# Patient Record
Sex: FEMALE | Race: WHITE | NOT HISPANIC OR LATINO | Employment: OTHER | URBAN - METROPOLITAN AREA
[De-identification: names, ages, dates, MRNs, and addresses within clinical notes are randomized per-mention and may not be internally consistent; named-entity substitution may affect disease eponyms.]

---

## 2017-01-05 ENCOUNTER — HOSPITAL ENCOUNTER (OUTPATIENT)
Dept: RADIOLOGY | Facility: HOSPITAL | Age: 57
Discharge: HOME | End: 2017-01-05
Attending: PSYCHIATRY & NEUROLOGY
Payer: COMMERCIAL

## 2017-01-05 ENCOUNTER — HOSPITAL ENCOUNTER (OUTPATIENT)
Dept: RADIOLOGY | Facility: HOSPITAL | Age: 57
Discharge: HOME | End: 2017-01-05
Attending: NURSE PRACTITIONER
Payer: COMMERCIAL

## 2017-01-05 DIAGNOSIS — G35 MULTIPLE SCLEROSIS: ICD-10-CM

## 2017-01-05 DIAGNOSIS — Z13.820 OSTEOPOROSIS SCREENING: ICD-10-CM

## 2017-01-05 PROCEDURE — 70553 MRI BRAIN STEM W/O & W/DYE: CPT | Mod: TC,PO

## 2017-01-05 PROCEDURE — A9585 GADOBUTROL INJECTION: HCPCS | Mod: PO | Performed by: PSYCHIATRY & NEUROLOGY

## 2017-01-05 PROCEDURE — 70543 MRI ORBT/FAC/NCK W/O &W/DYE: CPT | Mod: 26,,, | Performed by: RADIOLOGY

## 2017-01-05 PROCEDURE — 72156 MRI NECK SPINE W/O & W/DYE: CPT | Mod: 26,,, | Performed by: RADIOLOGY

## 2017-01-05 PROCEDURE — 77080 DXA BONE DENSITY AXIAL: CPT | Mod: 26,,, | Performed by: RADIOLOGY

## 2017-01-05 PROCEDURE — 72157 MRI CHEST SPINE W/O & W/DYE: CPT | Mod: TC,PO

## 2017-01-05 PROCEDURE — 77080 DXA BONE DENSITY AXIAL: CPT | Mod: TC,PO

## 2017-01-05 PROCEDURE — 72157 MRI CHEST SPINE W/O & W/DYE: CPT | Mod: 26,,, | Performed by: RADIOLOGY

## 2017-01-05 PROCEDURE — 70553 MRI BRAIN STEM W/O & W/DYE: CPT | Mod: 26,,, | Performed by: RADIOLOGY

## 2017-01-05 PROCEDURE — 25500020 PHARM REV CODE 255: Mod: PO | Performed by: PSYCHIATRY & NEUROLOGY

## 2017-01-05 PROCEDURE — 72156 MRI NECK SPINE W/O & W/DYE: CPT | Mod: TC,PO

## 2017-01-05 PROCEDURE — 70543 MRI ORBT/FAC/NCK W/O &W/DYE: CPT | Mod: TC,PO

## 2017-01-05 RX ORDER — GADOBUTROL 604.72 MG/ML
7 INJECTION INTRAVENOUS
Status: COMPLETED | OUTPATIENT
Start: 2017-01-05 | End: 2017-01-05

## 2017-01-05 RX ADMIN — GADOBUTROL 7 ML: 604.72 INJECTION INTRAVENOUS at 11:01

## 2017-01-06 ENCOUNTER — HOSPITAL ENCOUNTER (OUTPATIENT)
Dept: RADIOLOGY | Facility: HOSPITAL | Age: 57
Discharge: HOME | End: 2017-01-06
Attending: NURSE PRACTITIONER
Payer: COMMERCIAL

## 2017-01-06 DIAGNOSIS — R10.2 PELVIC PAIN IN FEMALE: ICD-10-CM

## 2017-01-06 PROCEDURE — 76830 TRANSVAGINAL US NON-OB: CPT | Mod: 26,,, | Performed by: RADIOLOGY

## 2017-01-06 PROCEDURE — 76856 US EXAM PELVIC COMPLETE: CPT | Mod: TC,PO

## 2017-01-06 PROCEDURE — 76856 US EXAM PELVIC COMPLETE: CPT | Mod: 26,,, | Performed by: RADIOLOGY

## 2017-01-11 ENCOUNTER — PATIENT MESSAGE (OUTPATIENT)
Dept: OPTOMETRY | Facility: CLINIC | Age: 57
End: 2017-01-11

## 2017-01-12 ENCOUNTER — PATIENT MESSAGE (OUTPATIENT)
Dept: OPTOMETRY | Facility: CLINIC | Age: 57
End: 2017-01-12

## 2017-01-12 RX ORDER — BUPROPION HYDROCHLORIDE 150 MG/1
150 TABLET ORAL DAILY
Qty: 90 TABLET | Refills: 1 | Status: SHIPPED | OUTPATIENT
Start: 2017-01-12 | End: 2017-08-10 | Stop reason: DRUGHIGH

## 2017-01-12 NOTE — TELEPHONE ENCOUNTER
Original authorizing provider: Michael V Knight, MD Karen D Eleser would like a refill of the following medications:   buPROPion (WELLBUTRIN XL) 150 MG TB24 tablet [Madhav Tyler MD]     Preferred pharmacy: [facility] #[id number] - [place], [place] - [address]     Comment:   Will you please change this prescription to a 90 day supply?     Thanks!   Karen Eleser

## 2017-01-25 ENCOUNTER — OFFICE VISIT (OUTPATIENT)
Dept: PSYCHIATRY | Facility: CLINIC | Age: 57
End: 2017-01-25
Payer: COMMERCIAL

## 2017-01-25 ENCOUNTER — OFFICE VISIT (OUTPATIENT)
Dept: OPTOMETRY | Facility: CLINIC | Age: 57
End: 2017-01-25
Payer: COMMERCIAL

## 2017-01-25 ENCOUNTER — CLINICAL SUPPORT (OUTPATIENT)
Dept: OPHTHALMOLOGY | Facility: CLINIC | Age: 57
End: 2017-01-25
Payer: COMMERCIAL

## 2017-01-25 VITALS
DIASTOLIC BLOOD PRESSURE: 87 MMHG | HEART RATE: 92 BPM | WEIGHT: 162 LBS | HEIGHT: 60 IN | SYSTOLIC BLOOD PRESSURE: 167 MMHG | BODY MASS INDEX: 31.8 KG/M2

## 2017-01-25 DIAGNOSIS — F33.1 MAJOR DEPRESSIVE DISORDER, RECURRENT, MODERATE: Primary | ICD-10-CM

## 2017-01-25 DIAGNOSIS — H40.053 OCULAR HYPERTENSION, BILATERAL: ICD-10-CM

## 2017-01-25 DIAGNOSIS — F41.1 GENERALIZED ANXIETY DISORDER: ICD-10-CM

## 2017-01-25 DIAGNOSIS — R53.82 CHRONIC FATIGUE: ICD-10-CM

## 2017-01-25 DIAGNOSIS — G35 MULTIPLE SCLEROSIS: ICD-10-CM

## 2017-01-25 DIAGNOSIS — F51.04 CHRONIC INSOMNIA: ICD-10-CM

## 2017-01-25 DIAGNOSIS — H40.053 OCULAR HYPERTENSION, BILATERAL: Primary | ICD-10-CM

## 2017-01-25 DIAGNOSIS — F33.1 MAJOR DEPRESSIVE DISORDER, RECURRENT EPISODE, MODERATE: ICD-10-CM

## 2017-01-25 DIAGNOSIS — H21.562 AFFERENT PUPILLARY DEFECT OF LEFT EYE: ICD-10-CM

## 2017-01-25 DIAGNOSIS — F34.1 DYSTHYMIC DISORDER: ICD-10-CM

## 2017-01-25 DIAGNOSIS — F41.1 GAD (GENERALIZED ANXIETY DISORDER): ICD-10-CM

## 2017-01-25 PROCEDURE — 90833 PSYTX W PT W E/M 30 MIN: CPT | Mod: S$GLB,,, | Performed by: PSYCHIATRY & NEUROLOGY

## 2017-01-25 PROCEDURE — 92012 INTRM OPH EXAM EST PATIENT: CPT | Mod: S$GLB,,, | Performed by: OPTOMETRIST

## 2017-01-25 PROCEDURE — 99999 PR PBB SHADOW E&M-EST. PATIENT-LVL IV: CPT | Mod: PBBFAC,,, | Performed by: OPTOMETRIST

## 2017-01-25 PROCEDURE — 99213 OFFICE O/P EST LOW 20 MIN: CPT | Mod: S$GLB,,, | Performed by: PSYCHIATRY & NEUROLOGY

## 2017-01-25 PROCEDURE — 92133 CPTRZD OPH DX IMG PST SGM ON: CPT | Mod: S$GLB,,, | Performed by: OPTOMETRIST

## 2017-01-25 PROCEDURE — 99999 PR PBB SHADOW E&M-EST. PATIENT-LVL III: CPT | Mod: PBBFAC,,, | Performed by: PSYCHIATRY & NEUROLOGY

## 2017-01-25 PROCEDURE — 92083 EXTENDED VISUAL FIELD XM: CPT | Mod: S$GLB,,, | Performed by: OPTOMETRIST

## 2017-01-25 PROCEDURE — 1159F MED LIST DOCD IN RCRD: CPT | Mod: S$GLB,,, | Performed by: PSYCHIATRY & NEUROLOGY

## 2017-01-25 RX ORDER — BUPROPION HYDROCHLORIDE 300 MG/1
300 TABLET ORAL DAILY
Qty: 90 TABLET | Refills: 1 | Status: SHIPPED | OUTPATIENT
Start: 2017-01-25 | End: 2017-08-10 | Stop reason: SDUPTHER

## 2017-01-25 RX ORDER — DEXTROAMPHETAMINE SACCHARATE, AMPHETAMINE ASPARTATE, DEXTROAMPHETAMINE SULFATE AND AMPHETAMINE SULFATE 5; 5; 5; 5 MG/1; MG/1; MG/1; MG/1
1 TABLET ORAL 3 TIMES DAILY
Qty: 90 TABLET | Refills: 0 | Status: SHIPPED | OUTPATIENT
Start: 2017-03-25 | End: 2017-05-02 | Stop reason: SDUPTHER

## 2017-01-25 RX ORDER — DULOXETIN HYDROCHLORIDE 60 MG/1
120 CAPSULE, DELAYED RELEASE ORAL DAILY
Qty: 180 CAPSULE | Refills: 1 | Status: SHIPPED | OUTPATIENT
Start: 2017-01-25 | End: 2017-08-10 | Stop reason: SDUPTHER

## 2017-01-25 RX ORDER — DEXTROAMPHETAMINE SACCHARATE, AMPHETAMINE ASPARTATE, DEXTROAMPHETAMINE SULFATE AND AMPHETAMINE SULFATE 5; 5; 5; 5 MG/1; MG/1; MG/1; MG/1
1 TABLET ORAL 3 TIMES DAILY
Qty: 90 TABLET | Refills: 0 | Status: SHIPPED | OUTPATIENT
Start: 2017-02-24 | End: 2017-05-02

## 2017-01-25 RX ORDER — DEXTROAMPHETAMINE SACCHARATE, AMPHETAMINE ASPARTATE, DEXTROAMPHETAMINE SULFATE AND AMPHETAMINE SULFATE 5; 5; 5; 5 MG/1; MG/1; MG/1; MG/1
1 TABLET ORAL 3 TIMES DAILY
Qty: 90 TABLET | Refills: 0 | Status: SHIPPED | OUTPATIENT
Start: 2017-01-25 | End: 2017-05-02

## 2017-01-25 RX ORDER — ALPRAZOLAM 0.5 MG/1
0.5 TABLET ORAL 2 TIMES DAILY PRN
Qty: 180 TABLET | Refills: 1 | Status: SHIPPED | OUTPATIENT
Start: 2017-01-25 | End: 2017-05-02 | Stop reason: SDUPTHER

## 2017-01-25 NOTE — PROGRESS NOTES
"Check baseline EKG on RTC.    Outpatient Psychiatry Follow-Up Visit (MD/NP)  1/25/2017    Session Length:  30 minutes (E&M plus psychotherapy)     Clinical Status of Patient: Outpatient (Ambulatory)    Chief Complaint: Karen D Eleser is a 56 y.o. female who presents today for follow-up of chronic depression and anxiety.    Met with patient.     Interval History and Content of Current Session:  Interim Events/Subjective Report/Content of Current Session:  First f/u appt with me since 10/20/2016.  Pt had seen Dr. Tash Clark in the past; last appointment was on 9/28/2015 (this note was reviewed).    Plan from last session: "Continue Cymbalta 120 mg daily  Add Wellbutrin  mg one tablet to the 300 mg tablet pt takes daily to = 450 mg/day. Pt denies Hx of seizures.    Xanax 0.5 mg BID prn anxiety  Ambien 5 mg po qhs prn insomnia  Also, continue Adderall immediate release 20 mg for treatment of fatigue/focus/concentration and treatment resistant depression. I have recommended she take no > 60 mg max per day"    "I'm OK.  Hanging in there".    She spends a lot of time by herself.      She has lost contact with a lot of her friends from work.    She does keep in touch with her best friend, who now lives north of [place].  They keep in touch by phone.    She has interests in movies and history.    She has a harder time reading since she was Dx with MS.    She states she has joined on line groups.    She is also researching her family roots/ancestry.    I encouraged pt to try to find friends to pursue activities with.    She talks about ancestry -- she has some ancestors from [place], [place].      She thinks her depression has been doing somewhat better.  She enjoys doing things outdoors in cool weather.  She likes to travel -- I recommended she try to join a group that enjoys travelling.      Current SIGECAPS:    Sleep -- has problems falling asleep; must take an Ambien and Xanax to be able to fall asleep.   Interests " "-- low ("I don't have anybody to do anything with" -- very few friends; had done a lot of things with her sister)    Guilt -- for "enabling my sister for so many years", not showing tough love.  Similar feelings with her mom (had to say "no" to her; feelings regarding circumstances surrounding her death).    Energy -- very low; always fatigued   Concentration -- poor due to MS; has a hard time reading   Appetite -- "not very good"; "I never thought I would say I am too tired to eat".    Psychomotor --  Somewhat decreased   Suicidal ideation -- denies   She does not feel optimistic about the future.       She continues to take Adderall; however, it tends to "hype" her up a lot, especially if she takes the max 3 tablets during the day. "I feel wired outside, but inside I still feel tired".    She also has had some illusions at night after taking Ambien (TV cord looks like a snake), as well as sleepwalking and sleepeating behaviors.     She has been rejected twice for SS disability.  She had an  the second time and they went before the , but the disability was still declined.    She states she still has sporadic flare ups of MS that tend to keep her basically confined to bed.   She still tries to use the CPAP nightly, but it is cumbersome and uncomfortable, so she has problems sleeping with it in place.   She also has anxiety attacks (not full panic); tends to have them in crowds, but sometimes she cannot state why.    She had seen Dr. Royce Pina for MS until his death in 2006.  She has seen other neurologists.  Dr. Maia Minaya is her neurologist currently.    Fatigue is her biggest physical problem.  She has chronic painful neuropathy in her feet, legs.      Past psych meds: celexa, lexapro, and effexor in the past.    From previous sessions:  Regarding her mom and sister:  Mother passed: 9/20/2015. Sister passed: 6/18/2014. Sister had problems with drugs and alcohol, was a nurse and lost her " "nursing license.  She  from a massive MI while in the senior living house after substance rehab.  She was close to them both.  She had enjoyed going to the movies, going out to eat, travel, etc.  However, she had always done these things with her sister, who  in 2014.  She has no one in her life that she is close to compared to her sister in the past.  She has a brother who is 6 years younger -- "he can be very abrasive, he talks real loud".  "He also has his life, his children".  She also notes he does not understand or appreciate her mental illness.   --Regarding past intimate relationships: She has had just one very serious relationship that lasted for 10 years.  She states the relationship ended because he lied to her & told her that he worked and stayed at his mother's house, caring for her.  However, he did not work, lived off his mother's SS, then was kicked out of the mom's trailer by his siblings after she , so he was homeless for a while.  She thinks he may be living with his ex-wife now.  She states this happened about 5 years ago.    Since then, she had met 2 guys on line ([email or web address]) -- went out with each of them a few times, but nothing ever came of this.    She notes her mother was very controlling.  Pt also was shy growing up.  Pt denies ever being sexually molested.  She denies ever having problems with alcohol or drugs.  She is out on disability from work ([employer]) as a .    She is still getting a check through her work disability co.     PSYCHOTHERAPY ADD-ON [id number]   30 (16-37*) minutes   · Target symptoms: depression, anxiety , fatigue  · Why chosen therapy is appropriate versus another modality: relevant to diagnosis, patient responds to this modality  · Outcome monitoring methods: self-report  · Therapeutic intervention type: supportive psychotherapy  · Topics discussed/themes: illness/death of loved ones, stress related to medical comorbidities, life stage " transitional issues, social isolation.  · The patient's response to the intervention is accepting.   · The patient's progress toward treatment goals is limited.  · Duration of intervention: 20 minutes      Review of Systems   · PSYCHIATRIC: Pertinant items are noted in the narrative.  · CONSTITUTIONAL: Some recent wt loss.  Chronic fatigue.     · MUSCULOSKELETAL: + chronic pain in her feet/legs  · NEUROLOGIC: No weakness, sensory changes, seizures, confusion, memory loss, tremor or other abnormal movements.  · ENDOCRINE: No polydipsia or polyuria.  · INTEGUMENTARY: No rashes or lacerations.  · EYES: No exophthalmos, jaundice or blindness.  · ENT: No dizziness, tinnitus or hearing loss.  · RESPIRATORY: No shortness of breath.  · CARDIOVASCULAR: No tachycardia or chest pain.  · GASTROINTESTINAL: No nausea, vomiting, pain, constipation or diarrhea.  · GENITOURINARY: No frequency, dysuria.        Past Medical, Family and Social History: The patient's past medical, family and social history have been reviewed and updated as appropriate within the electronic medical record -- see encounter notes.    Current Medications:    Current Outpatient Prescriptions on File Prior to Visit   Medication Sig Dispense Refill    alprazolam (XANAX) 0.5 MG tablet Take 1 tablet (0.5 mg total) by mouth 2 (two) times daily as needed for Anxiety. 180 tablet 1    b complex vitamins (VITAMIN B COMPLEX) tablet Take by mouth. 1 Tablet Oral Every day      buPROPion (WELLBUTRIN XL) 150 MG TB24 tablet Take 1 tablet (150 mg total) by mouth once daily. 90 tablet 1    buPROPion (WELLBUTRIN XL) 300 MG 24 hr tablet Take 1 tablet (300 mg total) by mouth once daily. Take with food. 90 tablet 1    cholecalciferol, vitamin D3, (VITAMIN D3) 5,000 unit Tab Take 5,000 Units by mouth once daily.       coenzyme Q10 (CO Q-10) 400 mg Cap Take by mouth.      COPAXONE 40 mg/mL Syrg injection INJECT 40MG SUBCUTANEOUSLY THREE TIMES A WEEK 12 Syringe 5    coQ10,  ubiquinol, 200 mg Cap Take 1 capsule by mouth once daily.      dextroamphetamine-amphetamine (ADDERALL) 20 mg tablet Take 1 tablet by mouth 3 (three) times daily. 90 tablet 0    dextroamphetamine-amphetamine (ADDERALL) 20 mg tablet Take 1 tablet by mouth 3 (three) times daily. 90 tablet 0    dextroamphetamine-amphetamine (ADDERALL) 20 mg tablet Take 1 tablet by mouth 3 (three) times daily. 90 tablet 0    diclofenac sodium (VOLTAREN) 1 % Gel Apply 2 g topically 4 (four) times daily. (Patient taking differently: Apply 2 g topically continuous prn. ) 5 Tube 3    duloxetine (CYMBALTA) 60 MG capsule Take 2 capsules (120 mg total) by mouth once daily. 2 Capsule, Delayed Release(E.C.) Oral Every day 180 capsule 1    gabapentin (NEURONTIN) 600 MG tablet TAKE 2 TABLETS EVERY MORNING, 1 TABLET AT NOON AND 2 TABLETS AT BEDTIME 450 tablet 1    latanoprost 0.005 % ophthalmic solution Place 1 drop into both eyes every evening. 2.5 mL 5    lovastatin (MEVACOR) 40 MG tablet TAKE 1 TABLET (40 MG TOTAL) BY MOUTH EVERY EVENING. 90 tablet 3    meloxicam (MOBIC) 15 MG tablet Take 1 tablet by mouth once daily.  3    multivitamin (THERAGRAN) per tablet Take by mouth. 1 Tablet Oral Every day      PREVIDENT 5000 SENSITIVE 1.1-5 % Pste Apply topically once daily.       rosuvastatin (CRESTOR) 10 MG tablet Take 1 tablet (10 mg total) by mouth every evening. 30 tablet 3    solifenacin (VESICARE) 5 MG tablet Take 2 tablets (10 mg total) by mouth once daily. 180 tablet 3    tizanidine (ZANAFLEX) 2 MG tablet TAKE 1/2 TABLET BY MOUTH TWICE A DAY THEN 2 TABS AT BEDTIME AS NEEDED 270 tablet 1    tramadol (ULTRAM) 50 mg tablet TAKE 1 TO 2 TABLETS BY MOUTH EVERY 6 HOURS AS NEEDED FOR PAIN 540 tablet 1    zolpidem (AMBIEN) 5 MG Tab TAKE 1 TABLET BY MOUTH EVERY EVENING 90 tablet 0     No current facility-administered medications on file prior to visit.        Compliance: yes    Side effects: None    Risk Parameters:  Patient reports no  "suicidal ideation  Patient reports no homicidal ideation  Patient reports no self-injurious behavior  Patient reports no violent behavior    Exam (detailed: at least 9 elements; comprehensive: all 15 elements)    Constitutional  Vitals:  Most recent vital signs, dated less than 90 days prior to this appointment, were reviewed.     Vitals - 1 value per visit 12/13/2016 1/25/2017 1/25/2017   SYSTOLIC 125  167   DIASTOLIC 77  87   PULSE 98  92   Weight (lb) 161  162   HEIGHT 5' 0"  5' 0"   BODY MASS INDEX 31.44  31.64   VISIT REPORT      Pain Score  5 0        Vitals - 1 value per visit 9/28/2016 10/20/2016 12/7/2016   SYSTOLIC 124 172 145   DIASTOLIC 86 90 78   PULSE 60 99 99   Weight (lb) 163.36 164 160.7   HEIGHT 5' 0" 5' 0" 5' 0"   BODY MASS INDEX 31.9 32.03 31.38   VISIT REPORT      Pain Score  5  5        General:  unremarkable, age appropriate, well dressed, neatly groomed. Not wearing make-up today (Cheondoism/charismatic elin)     Musculoskeletal  Muscle Strength/Tone:  no tremor, no tic    Gait & Station:  non-ataxic      Psychiatric  Speech:  normal tone, normal rate, normal pitch, normal volume, spontaneous    Mood & Affect:  "Not too good"  congruent and appropriate to situation/content. Appropriately tearful when discussing aspects of grief.    Thought Process:  normal and logical, goal-directed    Associations:  intact    Thought Content:  normal, no suicidality, no homicidality, delusions, or paranoia    Insight & Judgement:  good, intact  adequate to circumstances, age appropriate, good    Orientation:  grossly intact, person, place, time/date, situation    Memory:  intact for content of interview, grossly intact    Language:  grossly intact    Attention Span & Concentration:  able to focus    Fund of Knowledge:  intact and appropriate to age and level of education, familiar with aspects of current personal life    Assessment and Diagnosis    Status/Progress: Based on the examination today, the " "patient's problem(s) is/are adequately but not ideally controlled. New problems have not been presented today. Comorbidities are complicating management of the primary condition. There are no active rule-out diagnoses for this patient at this time.    General Impression:   Major Depressive Disorder, Recurrent: Moderate    Dysthymic Disorder   Generalized Anxiety Disorder   Chronic Insomnia   Also, multiple sclerosis    Intervention/Counseling/Treatment Plan    Continue all current meds:  Continue Cymbalta 120 mg daily  Continue Wellbutrin  mg total daily.  Pt has denied Hx of seizures.     Xanax 0.5 mg BID prn anxiety  Ambien 5 mg po qhs prn insomnia  Also, continue Adderall immediate release 20 mg for treatment of fatigue/focus/concentration and treatment resistant depression.  I have recommended she take no > 60 mg max per day.  Three Rx for Adderall 20 mg, each for a 30 day supply with no refills & with "Do not fill until" dates posted accordingly, were given to patient.   Encourage individual psychotherapy for support. Patient had been meeting with LCSW in Neurology clinic; she can continue this (if insurance allows) or consider f/u with Dr. Nery Toledo or other therapist in our clinic.        Additional Notes:  See above regarding JESÚS for previous psych progress notes.    No prior suspicious activity on [facility]  web site.  Pt had Adderall filled on 3/24/16, 12/29/15, and 10/8/15.  She had Xanax filled on 3/24/16, 10/29/16, 7/26/15, & 4/28/15.    Also, consider obtaining baseline EKG since pt is taking Adderall.  Also, consider possibility of ECT to help with refractory depression that meds are only partially treating.  She also had taken Celexa, Lexapro and Effexor in the past.      Return to Clinic: 3 months, or sooner prn.       "

## 2017-01-25 NOTE — PATIENT INSTRUCTIONS
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  Prior diagnosis of bilateral ocular hypertension.  Placed on Latanoprost 0.005% ophthalmic solution in each eye every night at the time of the last visit.  Good response of IOP to Latanoprost eyedrops in each eye, and IOP now within normal range in each eye.  No evidence of overtly glaucomaotus visual field changes in either eye per HVF test done today.  RNFL thickness within normal range 360° in the right eye, and focally borderline (NS) in the left eye.   Discussed ocular hypertension as risk factor for the possible development of glaucoma.  Continue Lataoprost in both eyes every night.  Recheck in six months with repeat HVF test (24-2 KARO Standard) at that time.  Orders for future HVF test entered.

## 2017-01-25 NOTE — MR AVS SNAPSHOT
[facility] - Optometry  [address]  [facility] [address]  Phone: [phone]  Fax: [phone]                  Karen D Eleser   2017 3:30 PM   Office Visit    Description:  Female : 1960   Provider:  Tony Murray OD   Department:  [facility] - Optometry           Reason for Visit     Concerns About Ocular Health           Diagnoses this Visit        Comments    Ocular hypertension, bilateral    -  Primary     Afferent pupillary defect of left eye                To Do List           Goals (5 Years of Data)     None      [facility]     [facility] On Call Nurse Care Line -  Assistance  Registered nurses in the [facility] Center provide clinical advisement, health education, appointment booking, and other advisory services.  Call for this free service at [phone].             Medications           Message regarding Medications     Verify the changes and/or additions to your medication regime listed below are the same as discussed with your clinician today.  If any of these changes or additions are incorrect, please notify your healthcare provider.             Verify that the below list of medications is an accurate representation of the medications you are currently taking.  If none reported, the list may be blank. If incorrect, please contact your healthcare provider. Carry this list with you in case of emergency.           Current Medications     alprazolam (XANAX) 0.5 MG tablet Take 1 tablet (0.5 mg total) by mouth 2 (two) times daily as needed for Anxiety.    b complex vitamins (VITAMIN B COMPLEX) tablet Take by mouth. 1 Tablet Oral Every day    buPROPion (WELLBUTRIN XL) 150 MG TB24 tablet Take 1 tablet (150 mg total) by mouth once daily.    buPROPion (WELLBUTRIN XL) 300 MG 24 hr tablet Take 1 tablet (300 mg total) by mouth once daily. Take with food.    cholecalciferol, vitamin D3, (VITAMIN D3) 5,000 unit Tab Take 5,000 Units by mouth once daily.     coenzyme Q10  (CO Q-10) 400 mg Cap Take by mouth.    COPAXONE 40 mg/mL Syrg injection INJECT 40MG SUBCUTANEOUSLY THREE TIMES A WEEK    coQ10, ubiquinol, 200 mg Cap Take 1 capsule by mouth once daily.    dextroamphetamine-amphetamine (ADDERALL) 20 mg tablet Take 1 tablet by mouth 3 (three) times daily.    dextroamphetamine-amphetamine (ADDERALL) 20 mg tablet Starting on Feb 24, 2017. Take 1 tablet by mouth 3 (three) times daily.    dextroamphetamine-amphetamine (ADDERALL) 20 mg tablet Starting on Mar 25, 2017. Take 1 tablet by mouth 3 (three) times daily.    diclofenac sodium (VOLTAREN) 1 % Gel Apply 2 g topically 4 (four) times daily.    duloxetine (CYMBALTA) 60 MG capsule Take 2 capsules (120 mg total) by mouth once daily. 2 Capsule, Delayed Release(E.C.) Oral Every day    gabapentin (NEURONTIN) 600 MG tablet TAKE 2 TABLETS EVERY MORNING, 1 TABLET AT NOON AND 2 TABLETS AT BEDTIME    latanoprost 0.005 % ophthalmic solution Place 1 drop into both eyes every evening.    lovastatin (MEVACOR) 40 MG tablet TAKE 1 TABLET (40 MG TOTAL) BY MOUTH EVERY EVENING.    meloxicam (MOBIC) 15 MG tablet Take 1 tablet by mouth once daily.    multivitamin (THERAGRAN) per tablet Take by mouth. 1 Tablet Oral Every day    PREVIDENT 5000 SENSITIVE 1.1-5 % Pste Apply topically once daily.     rosuvastatin (CRESTOR) 10 MG tablet Take 1 tablet (10 mg total) by mouth every evening.    solifenacin (VESICARE) 5 MG tablet Take 2 tablets (10 mg total) by mouth once daily.    tizanidine (ZANAFLEX) 2 MG tablet TAKE 1/2 TABLET BY MOUTH TWICE A DAY THEN 2 TABS AT BEDTIME AS NEEDED    tramadol (ULTRAM) 50 mg tablet TAKE 1 TO 2 TABLETS BY MOUTH EVERY 6 HOURS AS NEEDED FOR PAIN    zolpidem (AMBIEN) 5 MG Tab TAKE 1 TABLET BY MOUTH EVERY EVENING           Clinical Reference Information           Vital Signs - Last Recorded     LMP                   (LMP Unknown)           Allergies as of 1/25/2017     Levaquin  [Levofloxacin]      Immunizations Administered on Date of  Encounter - 1/25/2017     None      Orders Placed During Today's Visit     Future Labs/Procedures Expected by Expires    Dodd Visual Field - OU - Extended - Both Eyes  7/25/2017 1/25/2018      Instructions    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  Prior diagnosis of bilateral ocular hypertension.  Placed on Latanoprost 0.005% ophthalmic solution in each eye every night at the time of the last visit.  Good response of IOP to Latanoprost eyedrops in each eye, and IOP now within normal range in each eye.  No evidence of overtly glaucomaotus visual field changes in either eye per HVF test done today.  RNFL thickness within normal range 360° in the right eye, and focally borderline (NS) in the left eye.   Discussed ocular hypertension as risk factor for the possible development of glaucoma.  Continue Lataoprost in both eyes every night.  Recheck in six months with repeat HVF test (24-2 KARO Standard) at that time.  Orders for future HVF test entered.

## 2017-01-26 NOTE — PROGRESS NOTES
HPI     Concerns About Ocular Health    Additional comments: 1 month f/u            Comments   Patient in for progress check.  Patient is in for a 1 month f/u with [facility]/ Oct     Being followed for (diagnosis):   Ocular Hypertension    Date last seen:  12/07/2016    Doctor last seen:  Dr. Murray     Prescribed eye medications(s) using:  Latanoprost eye drops 1 x a day OU     OTC eye medication(s) using:  None     Signs/symptoms of condition resolved/better/stable/worse?:      Allergies/Medications reviewed today?  Yes              Last edited by Tim Velazquez on 1/25/2017  3:27 PM. (History)            Assessment /Plan     For exam results, see Encounter Report.    1. Ocular hypertension, bilateral  Dodd Visual Field - OU - Extended - Both Eyes   2. 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  Prior diagnosis of bilateral ocular hypertension.  Placed on Latanoprost 0.005% ophthalmic solution in each eye every night at the time of the last visit.  Good response of IOP to Latanoprost eyedrops in each eye, and IOP now within normal range in each eye.  No evidence of overtly glaucomaotus visual field changes in either eye per HVF test done today.  RNFL thickness within normal range 360° in the right eye, and focally borderline (NS) in the left eye.   Discussed ocular hypertension as risk factor for the possible development of glaucoma.  Continue Lataoprost in both eyes every night.  Recheck in six months with repeat HVF test (24-2 KARO Standard) at that time.  Orders for future HVF test entered.

## 2017-02-23 ENCOUNTER — PATIENT MESSAGE (OUTPATIENT)
Dept: NEUROLOGY | Facility: CLINIC | Age: 57
End: 2017-02-23

## 2017-02-24 ENCOUNTER — TELEPHONE (OUTPATIENT)
Dept: NEUROLOGY | Facility: CLINIC | Age: 57
End: 2017-02-24

## 2017-02-24 NOTE — TELEPHONE ENCOUNTER
Pt provided writer with name of her  and this information was inserted into signed release that pt prepared during her last clinic visit.  Phoned  Anat Neff at [phone] and left voicemail.

## 2017-03-08 NOTE — TELEPHONE ENCOUNTER
Left a second voicemail for Anat Neff,  with [facility] who's representing pt with her disability claim.  Awaiting return call.  Updated pt through [employer].

## 2017-03-13 ENCOUNTER — TELEPHONE (OUTPATIENT)
Dept: NEUROLOGY | Facility: CLINIC | Age: 57
End: 2017-03-13

## 2017-03-13 NOTE — TELEPHONE ENCOUNTER
Return call to pt's  Anat Neff [phone] inquiring if our center can provide any additional assistance with pt's disability claim.  Faxed authorization for release of information to [phone].

## 2017-03-28 ENCOUNTER — TELEPHONE (OUTPATIENT)
Dept: NEUROLOGY | Facility: CLINIC | Age: 57
End: 2017-03-28

## 2017-03-28 NOTE — TELEPHONE ENCOUNTER
Faxed forms and letter to support reconsideration of pt's denied SSDI benefit to pt's , Anat Tawanda, [phone].

## 2017-03-30 DIAGNOSIS — G47.00 INSOMNIA: ICD-10-CM

## 2017-03-30 RX ORDER — ZOLPIDEM TARTRATE 5 MG/1
TABLET ORAL
Qty: 90 TABLET | Status: CANCELLED | OUTPATIENT
Start: 2017-03-30

## 2017-04-03 RX ORDER — MELOXICAM 15 MG/1
15 TABLET ORAL DAILY
Qty: 90 TABLET | Refills: 2 | Status: SHIPPED | OUTPATIENT
Start: 2017-04-03 | End: 2018-01-19 | Stop reason: SDUPTHER

## 2017-04-05 RX ORDER — ZOLPIDEM TARTRATE 5 MG/1
5 TABLET ORAL NIGHTLY
Qty: 90 TABLET | Refills: 0 | Status: SHIPPED | OUTPATIENT
Start: 2017-04-05 | End: 2017-05-02 | Stop reason: SDUPTHER

## 2017-04-24 RX ORDER — GABAPENTIN 600 MG/1
TABLET ORAL
Qty: 450 TABLET | Refills: 1 | Status: SHIPPED | OUTPATIENT
Start: 2017-04-24 | End: 2017-12-21 | Stop reason: SDUPTHER

## 2017-05-02 ENCOUNTER — OFFICE VISIT (OUTPATIENT)
Dept: PSYCHIATRY | Facility: CLINIC | Age: 57
End: 2017-05-02
Payer: COMMERCIAL

## 2017-05-02 VITALS
SYSTOLIC BLOOD PRESSURE: 158 MMHG | HEART RATE: 98 BPM | BODY MASS INDEX: 30.59 KG/M2 | HEIGHT: 60 IN | DIASTOLIC BLOOD PRESSURE: 91 MMHG | WEIGHT: 155.81 LBS

## 2017-05-02 DIAGNOSIS — F41.1 GENERALIZED ANXIETY DISORDER: ICD-10-CM

## 2017-05-02 DIAGNOSIS — F41.1 GAD (GENERALIZED ANXIETY DISORDER): ICD-10-CM

## 2017-05-02 DIAGNOSIS — F34.1 DYSTHYMIC DISORDER: ICD-10-CM

## 2017-05-02 DIAGNOSIS — F51.04 CHRONIC INSOMNIA: ICD-10-CM

## 2017-05-02 DIAGNOSIS — R53.82 CHRONIC FATIGUE: ICD-10-CM

## 2017-05-02 DIAGNOSIS — F33.1 MAJOR DEPRESSIVE DISORDER, RECURRENT, MODERATE: Primary | ICD-10-CM

## 2017-05-02 PROCEDURE — 99999 PR PBB SHADOW E&M-EST. PATIENT-LVL III: CPT | Mod: PBBFAC,,, | Performed by: PSYCHIATRY & NEUROLOGY

## 2017-05-02 PROCEDURE — 1160F RVW MEDS BY RX/DR IN RCRD: CPT | Mod: S$GLB,,, | Performed by: PSYCHIATRY & NEUROLOGY

## 2017-05-02 PROCEDURE — 99213 OFFICE O/P EST LOW 20 MIN: CPT | Mod: S$GLB,,, | Performed by: PSYCHIATRY & NEUROLOGY

## 2017-05-02 PROCEDURE — 90833 PSYTX W PT W E/M 30 MIN: CPT | Mod: S$GLB,,, | Performed by: PSYCHIATRY & NEUROLOGY

## 2017-05-02 RX ORDER — DEXTROAMPHETAMINE SACCHARATE, AMPHETAMINE ASPARTATE, DEXTROAMPHETAMINE SULFATE AND AMPHETAMINE SULFATE 5; 5; 5; 5 MG/1; MG/1; MG/1; MG/1
1 TABLET ORAL 3 TIMES DAILY
Qty: 90 TABLET | Refills: 0 | Status: SHIPPED | OUTPATIENT
Start: 2017-05-02 | End: 2017-08-10

## 2017-05-02 RX ORDER — ZOLPIDEM TARTRATE 5 MG/1
5 TABLET ORAL NIGHTLY
Qty: 90 TABLET | Refills: 0 | Status: SHIPPED | OUTPATIENT
Start: 2017-05-02 | End: 2017-08-10 | Stop reason: SINTOL

## 2017-05-02 RX ORDER — ALPRAZOLAM 0.5 MG/1
0.5 TABLET ORAL 2 TIMES DAILY PRN
Qty: 180 TABLET | Refills: 0 | Status: SHIPPED | OUTPATIENT
Start: 2017-05-02 | End: 2017-08-10 | Stop reason: SDUPTHER

## 2017-05-02 RX ORDER — DEXTROAMPHETAMINE SACCHARATE, AMPHETAMINE ASPARTATE, DEXTROAMPHETAMINE SULFATE AND AMPHETAMINE SULFATE 5; 5; 5; 5 MG/1; MG/1; MG/1; MG/1
1 TABLET ORAL 3 TIMES DAILY
Qty: 90 TABLET | Refills: 0 | Status: SHIPPED | OUTPATIENT
Start: 2017-06-02 | End: 2017-08-10

## 2017-05-02 RX ORDER — DEXTROAMPHETAMINE SACCHARATE, AMPHETAMINE ASPARTATE, DEXTROAMPHETAMINE SULFATE AND AMPHETAMINE SULFATE 5; 5; 5; 5 MG/1; MG/1; MG/1; MG/1
1 TABLET ORAL 3 TIMES DAILY
Qty: 90 TABLET | Refills: 0 | Status: SHIPPED | OUTPATIENT
Start: 2017-07-01 | End: 2017-08-10 | Stop reason: SDUPTHER

## 2017-05-02 NOTE — PROGRESS NOTES
"Check baseline EKG on RTC.    Outpatient Psychiatry Follow-Up Visit (MD/NP)  2017    Session Length:  30 minutes (E&M plus psychotherapy)     Clinical Status of Patient: Outpatient (Ambulatory)    Chief Complaint: Karen D Eleser is a 56 y.o. female who presents today for follow-up of chronic depression and anxiety.    Met with patient.     Interval History and Content of Current Session:  Interim Events/Subjective Report/Content of Current Session:  First f/u appt with me since 2017.  Pt had seen Dr. Tash Clark in the past; last appointment was on 2015 (this note was reviewed).    Plan from last session: "Continue Cymbalta 120 mg daily  Add Wellbutrin  mg one tablet to the 300 mg tablet pt takes daily to = 450 mg/day. Pt denies Hx of seizures.    Xanax 0.5 mg BID prn anxiety  Ambien 5 mg po qhs prn insomnia  Also, continue Adderall immediate release 20 mg for treatment of fatigue/focus/concentration and treatment resistant depression. I have recommended she take no > 60 mg max per day"    "I'm OK.  Same as always".     She still spends a lot of time by herself.      She states she has never liked working in the yard, so she does not find this enjoyable or relaxing.  She states she enjoys reading.      Her father had endocarditis when he was younger.  He then had to have a pacemaker, then dual pacemaker/defibrillator.  He had 3 sisters (her aunts) who all had heart disease.  He had 3 older brothers who have been healthy.    She denies heart disease on her mother's side.    Her sister  from a massive MI at age 57 yo.  However, she abused pills and OTC meds and had gone to rehab at [place] just months prior to her death.    She notes she and her sister were very close; they even lived together for a while.    Pt denies ever having problems with her heart.  She states she never had a stress test, but she has had "plenty of" EKG's, the last one was  (there are telemetry strips in " "[facility]).      She has lost contact with a lot of her previous friends from work.    She does keep in touch with her best friend, who now lives north of [place].  They keep in touch by phone.    She has interests in movies and history.    She has a harder time reading since she was Dx with MS.    She states she has joined on line groups.    She is also researching her family roots/ancestry.  She has noted before that she has some ancestors from [place], [place].      She thinks her depression has been doing somewhat better.  She enjoys doing things outdoors in cool weather.  She likes to travel -- I have recommended she try to join a group that enjoys travelling.      Current SIGECAPS:    Sleep -- has problems falling asleep; must take an Ambien and Xanax to be able to fall asleep.   Interests -- low ("I don't have anybody to do anything with" -- very few friends; had done a lot of things with her sister)    Guilt -- for "enabling my sister for so many years", not showing tough love.  Similar feelings with her mom (had to say "no" to her; feelings regarding circumstances surrounding her death).    Energy -- very low; always fatigued   Concentration -- poor due to MS; has a hard time reading   Appetite -- "not very good"; "I never thought I would say I am too tired to eat".    Psychomotor --  Somewhat decreased   Suicidal ideation -- denies   She does not feel optimistic about the future.       She continues to take Adderall; however, it tends to "hype" her up a lot, especially if she takes the max 3 tablets during the day. "I feel wired outside, but inside I still feel tired".    She also has had some illusions at night after taking Ambien (TV cord looks like a snake), as well as sleepwalking and sleepeating behaviors.     She has been rejected twice for SS disability.  She had an  the second time and they went before the , but the disability was still declined.  She is still working towards disability, " "mainly from MS standpoint.    She states she still has sporadic flare ups of MS that tend to keep her basically confined to bed.   She still tries to use the CPAP nightly, but it is cumbersome and uncomfortable, so she has problems sleeping with it in place.   She also has anxiety attacks (not full panic); tends to have them in crowds, but sometimes she cannot state why.    She had seen Dr. Royce Pina for MS until his death in .  She has seen other neurologists.  Dr. Maia Minaya is her neurologist currently.    Fatigue is her biggest physical problem.  She has chronic painful neuropathy in her feet, legs.      Past psych meds: celexa, lexapro, and effexor in the past.    From previous sessions:  Regarding her mom and sister:  Mother passed: 2015. Sister passed: 2014. Sister had problems with drugs and alcohol, was a nurse and lost her nursing license.  She  from a massive MI while in the prison house after substance rehab.  She was close to them both.  She had enjoyed going to the movies, going out to eat, travel, etc.  However, she had always done these things with her sister, who  in 2014.  She has no one in her life that she is close to compared to her sister in the past.  She has a brother who is 6 years younger -- "he can be very abrasive, he talks real loud".  "He also has his life, his children".  She also notes he does not understand or appreciate her mental illness.   --Regarding past intimate relationships: She has had just one very serious relationship that lasted for 10 years.  She states the relationship ended because he lied to her & told her that he worked and stayed at his mother's house, caring for her.  However, he did not work, lived off his mother's SS, then was kicked out of the mom's trailer by his siblings after she , so he was homeless for a while.  She thinks he may be living with his ex-wife now.  She states this happened about 5 years ago.    Since " then, she had met 2 guys on line ([employer]) -- went out with each of them a few times, but nothing ever came of this.    She notes her mother was very controlling.  Pt also was shy growing up.  Pt denies ever being sexually molested.  She denies ever having problems with alcohol or drugs.  She is out on disability from work ([employer]) as a .    She is still getting a check through her work disability co.     PSYCHOTHERAPY ADD-ON [phone]   30 (16-37*) minutes   · Target symptoms: depression, anxiety , fatigue  · Why chosen therapy is appropriate versus another modality: relevant to diagnosis, patient responds to this modality  · Outcome monitoring methods: self-report  · Therapeutic intervention type: supportive psychotherapy  · Topics discussed/themes: SEE ABOVE-- illness/death of loved ones, stress related to medical comorbidities, life stage transitional issues, social isolation.  · The patient's response to the intervention is accepting.   · The patient's progress toward treatment goals is limited.  · Duration of intervention: 20 minutes      Review of Systems   · PSYCHIATRIC: Pertinant items are noted in the narrative.  · CONSTITUTIONAL: Some recent wt loss.  Chronic fatigue.     · MUSCULOSKELETAL: + chronic pain in her feet/legs  · NEUROLOGIC: No weakness, sensory changes, seizures, confusion, memory loss, tremor or other abnormal movements.  · ENDOCRINE: No polydipsia or polyuria.  · INTEGUMENTARY: No rashes or lacerations.  · EYES: No exophthalmos, jaundice or blindness.  · ENT: No dizziness, tinnitus or hearing loss.  · RESPIRATORY: No shortness of breath.  · CARDIOVASCULAR: No tachycardia or chest pain.  · GASTROINTESTINAL: No nausea, vomiting, pain, constipation or diarrhea.  · GENITOURINARY: No frequency, dysuria.        Past Medical, Family and Social History: The patient's past medical, family and social history have been reviewed and updated as appropriate within the electronic  medical record -- see encounter notes.    Current Medications:      Current Outpatient Prescriptions:     alprazolam (XANAX) 0.5 MG tablet, Take 1 tablet (0.5 mg total) by mouth 2 (two) times daily as needed for Anxiety., Disp: 180 tablet, Rfl: 0    b complex vitamins (VITAMIN B COMPLEX) tablet, Take by mouth. 1 Tablet Oral Every day, Disp: , Rfl:     buPROPion (WELLBUTRIN XL) 150 MG TB24 tablet, Take 1 tablet (150 mg total) by mouth once daily., Disp: 90 tablet, Rfl: 1    buPROPion (WELLBUTRIN XL) 300 MG 24 hr tablet, Take 1 tablet (300 mg total) by mouth once daily. Take with food., Disp: 90 tablet, Rfl: 1    cholecalciferol, vitamin D3, (VITAMIN D3) 5,000 unit Tab, Take 5,000 Units by mouth once daily. , Disp: , Rfl:     coenzyme Q10 (CO Q-10) 400 mg Cap, Take by mouth., Disp: , Rfl:     COPAXONE 40 mg/mL Syrg injection, INJECT 40MG SUBCUTANEOUSLY THREE TIMES A WEEK, Disp: 12 Syringe, Rfl: 5    coQ10, ubiquinol, 200 mg Cap, Take 1 capsule by mouth once daily., Disp: , Rfl:     dextroamphetamine-amphetamine (ADDERALL) 20 mg tablet, Take 1 tablet by mouth 3 (three) times daily., Disp: 90 tablet, Rfl: 0    [START ON 6/2/2017] dextroamphetamine-amphetamine (ADDERALL) 20 mg tablet, Take 1 tablet by mouth 3 (three) times daily., Disp: 90 tablet, Rfl: 0    [START ON 7/1/2017] dextroamphetamine-amphetamine (ADDERALL) 20 mg tablet, Take 1 tablet by mouth 3 (three) times daily., Disp: 90 tablet, Rfl: 0    diclofenac sodium (VOLTAREN) 1 % Gel, Apply 2 g topically 4 (four) times daily. (Patient taking differently: Apply 2 g topically continuous prn. ), Disp: 5 Tube, Rfl: 3    duloxetine (CYMBALTA) 60 MG capsule, Take 2 capsules (120 mg total) by mouth once daily. 2 Capsule, Delayed Release(E.C.) Oral Every day, Disp: 180 capsule, Rfl: 1    gabapentin (NEURONTIN) 600 MG tablet, TAKE 2 TABLETS EVERY MORNING, 1 TABLET AT NOON AND 2 TABLETS AT BEDTIME, Disp: 450 tablet, Rfl: 1    latanoprost 0.005 % ophthalmic  "solution, Place 1 drop into both eyes every evening., Disp: 2.5 mL, Rfl: 5    lovastatin (MEVACOR) 40 MG tablet, TAKE 1 TABLET (40 MG TOTAL) BY MOUTH EVERY EVENING., Disp: 90 tablet, Rfl: 3    meloxicam (MOBIC) 15 MG tablet, Take 1 tablet (15 mg total) by mouth once daily., Disp: 90 tablet, Rfl: 2    multivitamin (THERAGRAN) per tablet, Take by mouth. 1 Tablet Oral Every day, Disp: , Rfl:     PREVIDENT 5000 SENSITIVE 1.1-5 % Pste, Apply topically once daily. , Disp: , Rfl:     rosuvastatin (CRESTOR) 10 MG tablet, Take 1 tablet (10 mg total) by mouth every evening., Disp: 30 tablet, Rfl: 3    solifenacin (VESICARE) 5 MG tablet, Take 2 tablets (10 mg total) by mouth once daily., Disp: 180 tablet, Rfl: 3    tizanidine (ZANAFLEX) 2 MG tablet, TAKE 1/2 TABLET BY MOUTH TWICE A DAY THEN 2 TABS AT BEDTIME AS NEEDED, Disp: 270 tablet, Rfl: 1    tramadol (ULTRAM) 50 mg tablet, TAKE 1 TO 2 TABLETS BY MOUTH EVERY 6 HOURS AS NEEDED FOR PAIN, Disp: 540 tablet, Rfl: 1    zolpidem (AMBIEN) 5 MG Tab, Take 1 tablet (5 mg total) by mouth every evening., Disp: 90 tablet, Rfl: 0      Compliance: yes    Side effects: None    Risk Parameters:  Patient reports no suicidal ideation  Patient reports no homicidal ideation  Patient reports no self-injurious behavior  Patient reports no violent behavior    Exam (detailed: at least 9 elements; comprehensive: all 15 elements)    Constitutional  Vitals:  Most recent vital signs, dated less than 90 days prior to this appointment, were reviewed.     Vitals - 1 value per visit 1/25/2017 5/2/2017   SYSTOLIC 167 158   DIASTOLIC 87 91   PULSE 92 98   Weight (lb) 162 155.8   Weight (kg) 73.483 70.67   HEIGHT 5' 0" 5' 0"   BODY MASS INDEX 31.64 30.43   VISIT REPORT     Pain Score          Vitals - 1 value per visit 9/28/2016 10/20/2016 12/7/2016   SYSTOLIC 124 172 145   DIASTOLIC 86 90 78   PULSE 60 99 99   Weight (lb) 163.36 164 160.7   HEIGHT 5' 0" 5' 0" 5' 0"   BODY MASS INDEX 31.9 32.03 31.38 " "  VISIT REPORT      Pain Score  5  5        General:  unremarkable, age appropriate, well dressed, neatly groomed. Not wearing make-up today (Orthodox/charismatic elin)     Musculoskeletal  Muscle Strength/Tone:  no tremor, no tic    Gait & Station:  non-ataxic      Psychiatric  Speech:  normal tone, normal rate, normal pitch, normal volume, spontaneous    Mood & Affect:  "Not so good"  congruent and appropriate to situation/content. Appropriately tearful when discussing aspects of grief.    Thought Process:  normal and logical, goal-directed    Associations:  intact    Thought Content:  normal, no suicidality, no homicidality, delusions, or paranoia    Insight & Judgement:  good, intact  adequate to circumstances, age appropriate, good    Orientation:  grossly intact, person, place, time/date, situation    Memory:  intact for content of interview, grossly intact    Language:  grossly intact    Attention Span & Concentration:  able to focus    Fund of Knowledge:  intact and appropriate to age and level of education, familiar with aspects of current personal life    Assessment and Diagnosis    Status/Progress: Based on the examination today, the patient's problem(s) is/are adequately but not ideally controlled. New problems have not been presented today. Comorbidities are complicating management of the primary condition. There are no active rule-out diagnoses for this patient at this time.    General Impression:   Major Depressive Disorder, Recurrent: Moderate    Dysthymic Disorder   Generalized Anxiety Disorder   Chronic Insomnia   Also, multiple sclerosis    Intervention/Counseling/Treatment Plan    Continue all current meds:  Continue Cymbalta 120 mg daily  Continue Wellbutrin  mg total daily.  Pt has denied Hx of seizures.     Xanax 0.5 mg BID prn anxiety  Ambien 5 mg po qhs prn insomnia  Also, continue Adderall immediate release 20 mg for treatment of fatigue/focus/concentration and treatment resistant " "depression.  I have recommended she take no > 60 mg max per day, preferably less.  Three Rx for Adderall 20 mg, each for a 30 day supply with no refills & with "Do not fill until" dates posted accordingly, were given to patient.   Encourage individual psychotherapy for support. Patient had been meeting with LCSW in Neurology clinic; she can continue this (if insurance allows) or consider f/u with Dr. Nery Toledo or other therapist in our clinic.        Additional Notes:  See above regarding JESÚS for previous psych progress notes.    No prior suspicious activity on [facility]  web site.  Pt had Adderall filled on 3/24/16, 12/29/15, and 10/8/15.  She had Xanax filled on 3/24/16, 10/29/16, 7/26/15, & 4/28/15.    Also, consider obtaining baseline EKG since pt is taking Adderall.  Also, consider possibility of ECT to help with refractory depression that meds are only partially treating.  She also had taken Celexa, Lexapro and Effexor in the past.      Return to Clinic: 3 months, or sooner prn (scheduled on 8/10/17).      Will obtain EKG on RTC, as no study has been found in computer medical record since 8/1/2008.         "

## 2017-05-03 DIAGNOSIS — R25.2 SPASTICITY: ICD-10-CM

## 2017-05-03 DIAGNOSIS — G35 MULTIPLE SCLEROSIS: ICD-10-CM

## 2017-05-04 RX ORDER — TIZANIDINE 2 MG/1
TABLET ORAL
Qty: 270 TABLET | Refills: 1 | Status: SHIPPED | OUTPATIENT
Start: 2017-05-04 | End: 2017-11-15 | Stop reason: SDUPTHER

## 2017-05-21 ENCOUNTER — PATIENT MESSAGE (OUTPATIENT)
Dept: INTERNAL MEDICINE | Facility: CLINIC | Age: 57
End: 2017-05-21

## 2017-05-22 ENCOUNTER — TELEPHONE (OUTPATIENT)
Dept: OPTOMETRY | Facility: CLINIC | Age: 57
End: 2017-05-22

## 2017-05-22 RX ORDER — ROSUVASTATIN CALCIUM 10 MG/1
10 TABLET, COATED ORAL NIGHTLY
Qty: 30 TABLET | Refills: 6 | Status: SHIPPED | OUTPATIENT
Start: 2017-05-22 | End: 2017-06-22 | Stop reason: SDUPTHER

## 2017-06-22 ENCOUNTER — PATIENT MESSAGE (OUTPATIENT)
Dept: INTERNAL MEDICINE | Facility: CLINIC | Age: 57
End: 2017-06-22

## 2017-06-22 RX ORDER — ROSUVASTATIN CALCIUM 10 MG/1
10 TABLET, COATED ORAL NIGHTLY
Qty: 30 TABLET | Refills: 6 | Status: SHIPPED | OUTPATIENT
Start: 2017-06-22 | End: 2017-12-20 | Stop reason: SDUPTHER

## 2017-06-28 DIAGNOSIS — Z12.11 COLON CANCER SCREENING: ICD-10-CM

## 2017-07-05 DIAGNOSIS — G35 MS (MULTIPLE SCLEROSIS): ICD-10-CM

## 2017-07-05 RX ORDER — SOLIFENACIN SUCCINATE 5 MG/1
10 TABLET, FILM COATED ORAL DAILY
Qty: 180 TABLET | Refills: 1 | Status: SHIPPED | OUTPATIENT
Start: 2017-07-05 | End: 2018-08-13 | Stop reason: SDUPTHER

## 2017-07-07 DIAGNOSIS — G35 MULTIPLE SCLEROSIS: ICD-10-CM

## 2017-07-07 RX ORDER — TRAMADOL HYDROCHLORIDE 50 MG/1
TABLET ORAL
Qty: 540 TABLET | Refills: 0 | Status: SHIPPED | OUTPATIENT
Start: 2017-07-07 | End: 2017-10-27 | Stop reason: SDUPTHER

## 2017-07-10 RX ORDER — GLATIRAMER ACETATE 40 MG/ML
INJECTION, SOLUTION SUBCUTANEOUS
Qty: 12 SYRINGE | Refills: 4 | Status: SHIPPED | OUTPATIENT
Start: 2017-07-10 | End: 2018-01-19 | Stop reason: SDUPTHER

## 2017-07-24 ENCOUNTER — OFFICE VISIT (OUTPATIENT)
Dept: NEUROLOGY | Facility: CLINIC | Age: 57
End: 2017-07-24
Payer: COMMERCIAL

## 2017-07-24 ENCOUNTER — TELEPHONE (OUTPATIENT)
Dept: NEUROLOGY | Facility: CLINIC | Age: 57
End: 2017-07-24

## 2017-07-24 VITALS
SYSTOLIC BLOOD PRESSURE: 161 MMHG | DIASTOLIC BLOOD PRESSURE: 94 MMHG | HEIGHT: 60 IN | HEART RATE: 103 BPM | WEIGHT: 152 LBS | BODY MASS INDEX: 29.84 KG/M2

## 2017-07-24 DIAGNOSIS — G35 MULTIPLE SCLEROSIS: Primary | ICD-10-CM

## 2017-07-24 DIAGNOSIS — Z71.89 COUNSELING REGARDING GOALS OF CARE: ICD-10-CM

## 2017-07-24 DIAGNOSIS — G47.33 OSA (OBSTRUCTIVE SLEEP APNEA): ICD-10-CM

## 2017-07-24 DIAGNOSIS — F51.04 CHRONIC INSOMNIA: ICD-10-CM

## 2017-07-24 DIAGNOSIS — Z29.89 PROPHYLACTIC IMMUNOTHERAPY: ICD-10-CM

## 2017-07-24 DIAGNOSIS — R41.9 COGNITIVE COMPLAINTS: ICD-10-CM

## 2017-07-24 DIAGNOSIS — E55.9 VITAMIN D INSUFFICIENCY: ICD-10-CM

## 2017-07-24 DIAGNOSIS — F33.1 MAJOR DEPRESSIVE DISORDER, RECURRENT, MODERATE: ICD-10-CM

## 2017-07-24 DIAGNOSIS — M79.2 NEUROPATHIC PAIN: ICD-10-CM

## 2017-07-24 DIAGNOSIS — R53.82 CHRONIC FATIGUE: ICD-10-CM

## 2017-07-24 DIAGNOSIS — R25.2 SPASM: ICD-10-CM

## 2017-07-24 PROCEDURE — 99999 PR PBB SHADOW E&M-EST. PATIENT-LVL III: CPT | Mod: PBBFAC,,, | Performed by: PHYSICIAN ASSISTANT

## 2017-07-24 PROCEDURE — 99215 OFFICE O/P EST HI 40 MIN: CPT | Mod: S$GLB,,, | Performed by: PHYSICIAN ASSISTANT

## 2017-07-24 NOTE — Clinical Note
Dr. Tyler,  I am seeing Krystal in clinic today and we were discussing her Ambien and sleep. She has said that for the last approx. 6 months she has noticed hallucinations where she hadn't before.  Do you think the increase in Wellbutrin could have anything to do with this?    Daniela

## 2017-07-24 NOTE — PROGRESS NOTES
I met with Ms. Eleser, today, after her follow up wit Daniela Mascorro PA-C.  She requested assistance with a disability-related form.  She presented as anxious and with rapid speech.  She expressed worry about her SSA disability claim, which will be appealed at a higher level.  I offered continued support by phone or in-person, and agreed to contact her once her form is ready.

## 2017-07-24 NOTE — PROGRESS NOTES
"Subjective:       Patient ID: Karen D Eleser is a 56 y.o. female who presents today for a routine clinic visit for MS.    MS HPI:  · DMT: Copaxone  · Side effects from DMT? No  · Taking vitamin D3 as recommended? Yes -  Dose: 5,000 IU daily      SOCIAL HISTORY  Social History   Substance Use Topics    Smoking status: Never Smoker    Smokeless tobacco: Never Used    Alcohol use Yes      Comment: rare     Living arrangements - the patient lives alone.  Employment N/A - Disability    MS ROS:  · Fatigue: Yes - taking Adderall(taking about 3 times weekly) and continues to have fatigue; Adderall is prescribed by Dr. Tyler;   · Sleep Disturbance: Yes - CPAP using once or twice weekly only. She has tried 3 different masks;  Ambien she feels has caused some hallucinations lately and she is trying to limit use  · Bladder Dysfunction: Yes -stable with Vesicare   · Bowel Dysfunction: No  · Spasticity: Yes - taking tizanidine (1mg/1mg) during the day PRN and 4mg HS  · Visual Symptoms: Feels that distance vision in L eye has worsened.   · Cognitive: Yes - feels this is getting worse; would like to repeat NP testing last in 2009(notes in [facility] documents)  · Mood Disorder: Yes - sees Dr. Tyler regularly; Wellbutrin increased to 450mg earlier this year. She is not currently in counseling  · Gait Disturbance: Yes--slightly off balance, but overall stable  · Falls: No  · Hand Dysfunction: Yes - numbness L UE  · Pain: Yes - has diagnosis of fibromyalgia and sees Rheumatology; on Cymbalta and gabapentin. Low back hurts "all the time"-chronic--had injections done by Dr. Jacome in the past   · Sexual Dysfunction: Not Assessed  · Skin Breakdown: No  · Tremors: No  · Dysphagia: No  · Dysarthria: No  · Heat sensitivity: Yes--manages with avoidance; has cooling towels;    · Any un-met adaptive needs? No  · Copay Assist? Yes -$0            Objective:        1. 25 foot timed walk:  Timed 25 Foot Walk: 12/13/2016 7/24/2017   Did patient " wear an AFO? No No   Was assistive device used? No No   Time for 25 Foot Walk (seconds) 5.2 5   Time for 25 Foot Walk (seconds) 5.3 -     Neurologic Exam     Mental Status   Oriented to person, place, and time.   Follows 3 step commands.   Speech: speech is normal   Level of consciousness: alert  Normal comprehension.     Cranial Nerves     CN II   Visual acuity: decreased (20/25 OD and 20/100 OS with Snellen hand held chart at 6 ft)    CN III, IV, VI   Extraocular motions are normal.   Right pupil: Shape: regular. Reactivity: brisk. Consensual response: intact.   Left pupil: Shape: regular. Consensual response: APD.     CN V   Facial sensation intact.     CN VII   Facial expression full, symmetric.     CN VIII   Hearing: intact (finger rub)    CN IX, X   Palate: symmetric    CN XI   CN XI normal.     CN XII   Tongue deviation: none    Motor Exam   Muscle bulk: normal  Overall muscle tone: normal    Strength   Right deltoid: 5/5  Left deltoid: 5/5  Right triceps: 5/5  Left triceps: 5/5  Right wrist extension: 5/5  Left wrist extension: 5/5  Right interossei: 5/5  Left interossei: 5/5  Right iliopsoas: 5/5  Left iliopsoas: 5/5  Right hamstrin/5  Left hamstrin/5  Right anterior tibial: 5/5  Left anterior tibial: 5/5  Right peroneal: 5/5  Left peroneal: 5/5    Sensory Exam   Light touch normal.   Left arm light touch: tingling.  Left leg light touch: tingling.  Right arm vibration: decreased from fingers  Left arm vibration: decreased from fingers  Right leg vibration: decreased from toes  Left leg vibration: decreased from toes    Gait, Coordination, and Reflexes     Gait  Gait: wide-based    Coordination   Finger to nose coordination: normal  Tandem walking coordination: abnormal    Tremor   Resting tremor: absent  Action tremor: absent    Reflexes   Right brachioradialis: 2+  Left brachioradialis: 2+  Right biceps: 2+  Left biceps: 2+  Right triceps: 2+  Left triceps: 2+  Right patellar: 3+  Left patellar:  3+  Right achilles: 1+  Left achilles: 1+  Right plantar: upgoing  Left plantar: upgoing  Right ankle clonus: absent  Left ankle clonus: absent  Right pendular knee jerk: absent  Left pendular knee jerk: absentNormal Heel/toe walk  Normal RSM         Imaging:   Annual MRI Brain 1/2018    Labs:     Lab Results   Component Value Date    [id number] 39 12/13/2016    [id number] 76 01/22/2016    [id number] 70 04/08/2014     No results found for: JCVINDEX, JCVANTIBODY  No results found for: WJ0JACXT, ABSOLUTECD3, FE0VHYHY, ABSOLUTECD8, GJ5SDFXZ, ABSOLUTECD4, LABCD48    Diagnosis/Assessment/Plan:    1. Multiple Sclerosis  · Assessment: Patient is stable clinically on Copaxone  · Imaging: will plan for annual MRI in January 2018  · Disease Modifying Therapies: Continue Copaxone and high dose Vit D3.     2. MS Symptom Assessment / Management  · Cognitive: will plan to repeat NP testing(previous results in legacy documents)  · Mood Disorder: will message Dr. Tyler about hallucinations she has begun to experience lately to discuss medication changes/adjustments possible    Over 50% of this 40 minute visit was spent in direct face to face counseling of the patient regarding her current symptoms and management of same.  Follow up in 6 months with Dr. Minaya  Patient agreed to POC today.    Attending, Dr. Minaya, was available during today's encounter. Any change to plan along with cosign to appear in the EMR.     Daniela Mascorro PA-C  [facility]        Multiple sclerosis  -     Ambulatory Referral to Neuropsychology  -     MRI Brain W WO Contrast; Future; Expected date: 01/08/2018    Cognitive complaints  -     Ambulatory Referral to Neuropsychology

## 2017-07-26 ENCOUNTER — TELEPHONE (OUTPATIENT)
Dept: NEUROLOGY | Facility: CLINIC | Age: 57
End: 2017-07-26

## 2017-07-26 RX ORDER — BUPROPION HYDROCHLORIDE 150 MG/1
TABLET ORAL
Qty: 90 TABLET | Refills: 1 | OUTPATIENT
Start: 2017-07-26

## 2017-07-26 NOTE — PROGRESS NOTES
Spoke with patient via phone this morning regarding hallucinations she is experiencing. After communication with   Dr. Tyler we have advised decreasing Wellbutrin XL to 300mg. Patient is in agreement with this plan, and is to follow up with Dr. Tyler in clinic on August 10th.

## 2017-08-10 ENCOUNTER — OFFICE VISIT (OUTPATIENT)
Dept: PSYCHIATRY | Facility: CLINIC | Age: 57
End: 2017-08-10
Payer: COMMERCIAL

## 2017-08-10 ENCOUNTER — HOSPITAL ENCOUNTER (OUTPATIENT)
Dept: CARDIOLOGY | Facility: CLINIC | Age: 57
Discharge: HOME | End: 2017-08-10
Payer: COMMERCIAL

## 2017-08-10 VITALS
WEIGHT: 152 LBS | BODY MASS INDEX: 29.84 KG/M2 | DIASTOLIC BLOOD PRESSURE: 84 MMHG | SYSTOLIC BLOOD PRESSURE: 138 MMHG | HEIGHT: 60 IN | HEART RATE: 95 BPM

## 2017-08-10 DIAGNOSIS — F33.1 MAJOR DEPRESSIVE DISORDER, RECURRENT, MODERATE: ICD-10-CM

## 2017-08-10 DIAGNOSIS — F33.1 MAJOR DEPRESSIVE DISORDER, RECURRENT EPISODE, MODERATE: ICD-10-CM

## 2017-08-10 DIAGNOSIS — F33.0 MDD (MAJOR DEPRESSIVE DISORDER), RECURRENT EPISODE, MILD: Primary | ICD-10-CM

## 2017-08-10 DIAGNOSIS — F41.1 GENERALIZED ANXIETY DISORDER: ICD-10-CM

## 2017-08-10 DIAGNOSIS — F34.1 DYSTHYMIC DISORDER: ICD-10-CM

## 2017-08-10 DIAGNOSIS — R53.82 CHRONIC FATIGUE: ICD-10-CM

## 2017-08-10 DIAGNOSIS — F33.9 EPISODE OF RECURRENT MAJOR DEPRESSIVE DISORDER, UNSPECIFIED DEPRESSION EPISODE SEVERITY: ICD-10-CM

## 2017-08-10 DIAGNOSIS — F51.04 CHRONIC INSOMNIA: ICD-10-CM

## 2017-08-10 DIAGNOSIS — F41.1 GAD (GENERALIZED ANXIETY DISORDER): ICD-10-CM

## 2017-08-10 DIAGNOSIS — Z79.899 MEDICATION MANAGEMENT: ICD-10-CM

## 2017-08-10 PROCEDURE — 93000 ELECTROCARDIOGRAM COMPLETE: CPT | Mod: S$GLB,,, | Performed by: INTERNAL MEDICINE

## 2017-08-10 PROCEDURE — 99999 PR PBB SHADOW E&M-EST. PATIENT-LVL III: CPT | Mod: PBBFAC,,, | Performed by: PSYCHIATRY & NEUROLOGY

## 2017-08-10 PROCEDURE — 99213 OFFICE O/P EST LOW 20 MIN: CPT | Mod: S$GLB,,, | Performed by: PSYCHIATRY & NEUROLOGY

## 2017-08-10 PROCEDURE — 90833 PSYTX W PT W E/M 30 MIN: CPT | Mod: S$GLB,,, | Performed by: PSYCHIATRY & NEUROLOGY

## 2017-08-10 RX ORDER — ALPRAZOLAM 0.5 MG/1
0.5 TABLET ORAL 2 TIMES DAILY PRN
Qty: 180 TABLET | Refills: 0 | Status: SHIPPED | OUTPATIENT
Start: 2017-08-10 | End: 2017-11-02 | Stop reason: SDUPTHER

## 2017-08-10 RX ORDER — DEXTROAMPHETAMINE SACCHARATE, AMPHETAMINE ASPARTATE, DEXTROAMPHETAMINE SULFATE AND AMPHETAMINE SULFATE 5; 5; 5; 5 MG/1; MG/1; MG/1; MG/1
1 TABLET ORAL 3 TIMES DAILY
Qty: 90 TABLET | Refills: 0 | Status: SHIPPED | OUTPATIENT
Start: 2017-08-10 | End: 2017-11-02

## 2017-08-10 RX ORDER — DEXTROAMPHETAMINE SACCHARATE, AMPHETAMINE ASPARTATE, DEXTROAMPHETAMINE SULFATE AND AMPHETAMINE SULFATE 5; 5; 5; 5 MG/1; MG/1; MG/1; MG/1
1 TABLET ORAL 3 TIMES DAILY
Qty: 90 TABLET | Refills: 0 | Status: SHIPPED | OUTPATIENT
Start: 2017-10-09 | End: 2017-11-02 | Stop reason: SDUPTHER

## 2017-08-10 RX ORDER — DEXTROAMPHETAMINE SACCHARATE, AMPHETAMINE ASPARTATE, DEXTROAMPHETAMINE SULFATE AND AMPHETAMINE SULFATE 5; 5; 5; 5 MG/1; MG/1; MG/1; MG/1
1 TABLET ORAL 3 TIMES DAILY
Qty: 90 TABLET | Refills: 0 | Status: SHIPPED | OUTPATIENT
Start: 2017-09-09 | End: 2017-11-02

## 2017-08-10 RX ORDER — DULOXETIN HYDROCHLORIDE 60 MG/1
120 CAPSULE, DELAYED RELEASE ORAL DAILY
Qty: 180 CAPSULE | Refills: 1 | Status: SHIPPED | OUTPATIENT
Start: 2017-08-10 | End: 2018-03-21 | Stop reason: SDUPTHER

## 2017-08-10 RX ORDER — BUPROPION HYDROCHLORIDE 300 MG/1
300 TABLET ORAL DAILY
Qty: 90 TABLET | Refills: 1 | Status: SHIPPED | OUTPATIENT
Start: 2017-08-10 | End: 2018-02-01 | Stop reason: SDUPTHER

## 2017-08-10 NOTE — PATIENT INSTRUCTIONS
Continue all current medications with refills as noted.   Return to clinic in 3 months for follow up appt.

## 2017-08-10 NOTE — PROGRESS NOTES
"Outpatient Psychiatry Follow-Up Visit (MD/NP)  08/10/2017    Session Length:  30 minutes (E&M plus psychotherapy)     Clinical Status of Patient: Outpatient (Ambulatory)    Chief Complaint: Karen D Eleser is a 56 y.o. female who presents today for follow-up of chronic depression and anxiety.    Met with patient.     Interval History and Content of Current Session:  Interim Events/Subjective Report/Content of Current Session:  First f/u appt with me since 5/2/2017.  Pt had seen Dr. Tash Clark in the past; last appointment was on 9/28/2015 (this note was reviewed).    Plan from last session: "Continue Cymbalta 120 mg daily  Add Wellbutrin  mg one tablet to the 300 mg tablet pt takes daily to = 450 mg/day. Pt denies Hx of seizures.    Xanax 0.5 mg BID prn anxiety  Ambien 5 mg po qhs prn insomnia  Also, continue Adderall immediate release 20 mg for treatment of fatigue/focus/concentration and treatment resistant depression. I have recommended she take no > 60 mg max per day"    "I'm OK".     She states she had to stop taking the Ambien because it was causing visual hallucinations and illusions at night (i.e., TV cord looks like a snake), as well as sleepwalking and sleepeating behaviors.   She states she uses 50 mg Benadryl -- it seems to help OK with sleep.    Also, I had spoken to Daniela Toure in Neurology.  She had asked me about dropping the dose of the Wellbutrin XL from 450 mg to 300 mg daily.    She states since she cut back on the dose of the Wellbutrin, she has been able to fall asleep with just the Benadryl.      "I still have good days and bad days".  She states most days she does not feel that happy about much in life.    On 8/22/17 she is going to [place]; she will be touring with a group of other women.  This was organized through an tourist agency based on line.  I lauded her and encouraged her to fully enjoy the trip.    She states she enjoys reading and watching certain TV shows, " "particularly historical based fiction.    She has a harder time reading since she was Dx with MS, as it has affected her ability to concentrate.    She states she has joined on line groups for various interests.     She enjoys doing things outdoors in cool weather.  She likes to travel.       Her main physical complaint is she has chronic painful neuropathy in her feet, legs due to lumbar disc herniation.    She states she has had problems with verbal expression of words that she wants to say.      Her father had endocarditis when he was younger.  He then had to have a pacemaker, then dual pacemaker/defibrillator.  He also had 3 sisters (her aunts) who all had heart disease (I did not asked if they smoked).  He had 3 older brothers who have been healthy.  She denies heart disease on her mother's side.    Her sister  from a massive MI at age 57 yo.  However, she abused pills and OTC meds and had gone to rehab at [facility] just months prior to her death.  She notes she and her sister were very close; they even lived together for a while.    Pt denies ever having problems with her heart.  She states she never had a stress test, but she has had "plenty of" EKG's, the last one was  (there are telemetry strips in [facility]).      She has lost contact with a lot of her previous friends from work.    She does keep in touch with her best friend, who now lives north of [place].  They keep in touch by phone.    She is also researching her family roots/ancestry.  She has noted before that she has some ancestors from [place], [place].      She thinks her depression has been doing somewhat better.   She continues to take Adderall; however, it tends to "hype" her up a lot, especially if she takes the max 3 tablets during the day. "I feel wired outside, but inside I still feel tired".      Current SIGECAPS:    Sleep -- has problems falling asleep; must take an Ambien and Xanax to be able to fall asleep.   Interests -- low ("I " "don't have anybody to do anything with" -- very few friends; had done a lot of things with her sister)    Guilt -- for "enabling my sister for so many years", not showing tough love.  Similar feelings with her mom (had to say "no" to her; feelings regarding circumstances surrounding her death).    Energy -- very low; always fatigued   Concentration -- poor due to MS; has a hard time reading   Appetite -- "not very good"; "I never thought I would say I am too tired to eat".    Psychomotor --  Somewhat decreased   Suicidal ideation -- denies   She does not feel optimistic about the future.      She has been rejected twice for SS disability.  She had an  the second time and they went before the , but the disability was still declined.  She is still working towards disability, mainly from MS standpoint.    She states she still has sporadic flare ups of MS that tend to keep her basically confined to bed.   She states she has been Dx with CHELSEA.  She has tried to use the CPAP nightly, but it is cumbersome and uncomfortable, so she has problems sleeping with it in place.  She states she still has a fair amount of fatigue during the day.   She also has anxiety attacks (not full panic); tends to have them in crowds, but sometimes she cannot state why.    She had seen Dr. Royce Pina for MS until his death in .  She has seen other neurologists.  Dr. Maia Minaya is her neurologist currently.      Past psych meds: celexa, lexapro, and effexor in the past.    From previous sessions:  Regarding her mom and sister:  Mother passed: 2015. Sister passed: 2014. Sister had problems with drugs and alcohol, was a nurse and lost her nursing license.  She  from a massive MI while in the skilled nursing house after substance rehab.  She was close to them both.  She had enjoyed going to the movies, going out to eat, travel, etc.  However, she had always done these things with her sister, who  in 2014.  She " "has no one in her life that she is close to compared to her sister in the past.  She has a brother who is 6 years younger -- "he can be very abrasive, he talks real loud".  "He also has his life, his children".  She also notes he does not understand or appreciate her mental illness.   --Regarding past intimate relationships: She has had just one very serious relationship that lasted for 10 years.  She states the relationship ended because he lied to her & told her that he worked and stayed at his mother's house, caring for her.  However, he did not work, lived off his mother's SS, then was kicked out of the mom's trailer by his siblings after she , so he was homeless for a while.  She thinks he may be living with his ex-wife now.  She states this happened about 5 years ago.    Since then, she had met 2 guys on line ([employer]) -- went out with each of them a few times, but nothing ever came of this.    She notes her mother was very controlling.  Pt also was shy growing up.  Pt denies ever being sexually molested.  She denies ever having problems with alcohol or drugs.  She is out on disability from work ([employer]) as a .    She is still getting a check through her work disability co.     PSYCHOTHERAPY ADD-ON [id number]   30 (16-37*) minutes   · Target symptoms: depression, anxiety, fatigue  · Why chosen therapy is appropriate versus another modality: relevant to diagnosis, patient responds to this modality  · Outcome monitoring methods: self-report  · Therapeutic intervention type: supportive psychotherapy  · Topics discussed/themes: SEE ABOVE-- illness/death of loved ones, stress related to medical comorbidities, life stage transitional issues, social isolation, self care/nurturing.  · The patient's response to the intervention is accepting.   · The patient's progress toward treatment goals is fair.  · Duration of intervention: 20 minutes      Review of Systems   · PSYCHIATRIC: Pertinant items " are noted in the narrative.  · CONSTITUTIONAL: Some recent wt loss.  Chronic fatigue.     · MUSCULOSKELETAL: + chronic pain in her feet/legs  · NEUROLOGIC: No weakness, sensory changes, seizures, confusion, memory loss, tremor or other abnormal movements.  · ENDOCRINE: No polydipsia or polyuria.  · INTEGUMENTARY: No rashes or lacerations.  · EYES: No exophthalmos, jaundice or blindness.  · ENT: No dizziness, tinnitus or hearing loss.  · RESPIRATORY: No shortness of breath.  · CARDIOVASCULAR: No tachycardia or chest pain.  · GASTROINTESTINAL: No nausea, vomiting, pain, constipation or diarrhea.  · GENITOURINARY: No frequency, dysuria.        Past Medical, Family and Social History: The patient's past medical, family and social history have been reviewed and updated as appropriate within the electronic medical record -- see encounter notes.    Current Medications:      alprazolam (XANAX) 0.5 MG tablet, Take 1 tablet (0.5 mg total) by mouth 2 (two) times daily as needed for Anxiety., Disp: 180 tablet, Rfl: 0    b complex vitamins (VITAMIN B COMPLEX) tablet, Take by mouth. 1 Tablet Oral Every day, Disp: , Rfl:     buPROPion (WELLBUTRIN XL) 300 MG 24 hr tablet, Take 1 tablet (300 mg total) by mouth once daily. Take with food., Disp: 90 tablet, Rfl: 1    cholecalciferol, vitamin D3, (VITAMIN D3) 5,000 unit Tab, Take 5,000 Units by mouth once daily. , Disp: , Rfl:     coenzyme Q10 (CO Q-10) 400 mg Cap, Take by mouth., Disp: , Rfl:     COPAXONE 40 mg/mL Syrg injection, INJECT 40MG SUBCUTANEOUSLY THREE TIMES A WEEK, Disp: 12 Syringe, Rfl: 4    coQ10, ubiquinol, 200 mg Cap, Take 1 capsule by mouth once daily., Disp: , Rfl:     dextroamphetamine-amphetamine (ADDERALL) 20 mg tablet, Take 1 tablet by mouth 3 (three) times daily., Disp: 90 tablet, Rfl: 0    diclofenac sodium (VOLTAREN) 1 % Gel, Apply 2 g topically 4 (four) times daily. (Patient taking differently: Apply 2 g topically continuous prn. ), Disp: 5 Tube, Rfl:  3    duloxetine (CYMBALTA) 60 MG capsule, Take 2 capsules (120 mg total) by mouth once daily. 2 Capsule, Delayed Release(E.C.) Oral Every day, Disp: 180 capsule, Rfl: 1    gabapentin (NEURONTIN) 600 MG tablet, TAKE 2 TABLETS EVERY MORNING, 1 TABLET AT NOON AND 2 TABLETS AT BEDTIME, Disp: 450 tablet, Rfl: 1    latanoprost 0.005 % ophthalmic solution, Place 1 drop into both eyes every evening., Disp: 2.5 mL, Rfl: 5    lovastatin (MEVACOR) 40 MG tablet, TAKE 1 TABLET (40 MG TOTAL) BY MOUTH EVERY EVENING., Disp: 90 tablet, Rfl: 3    meloxicam (MOBIC) 15 MG tablet, Take 1 tablet (15 mg total) by mouth once daily., Disp: 90 tablet, Rfl: 2    multivitamin (THERAGRAN) per tablet, Take by mouth. 1 Tablet Oral Every day, Disp: , Rfl:     PREVIDENT 5000 SENSITIVE 1.1-5 % Pste, Apply topically once daily. , Disp: , Rfl:     rosuvastatin (CRESTOR) 10 MG tablet, Take 1 tablet (10 mg total) by mouth every evening., Disp: 30 tablet, Rfl: 6    tizanidine (ZANAFLEX) 2 MG tablet, TAKE 1/2 TABLET BY MOUTH TWICE A DAY THEN 2 TABS AT BEDTIME AS NEEDED, Disp: 270 tablet, Rfl: 1    tramadol (ULTRAM) 50 mg tablet, TAKE 1 TO 2 TABLETS BY MOUTH EVERY 6 HOURS AS NEEDED FOR PAIN, Disp: 540 tablet, Rfl: 0    VESICARE 5 mg tablet, TAKE 2 TABLETS (10 MG TOTAL) BY MOUTH ONCE DAILY., Disp: 180 tablet, Rfl: 1    Compliance: yes    Side effects: Ambien -- visual hallucinations and illusions, sleepwalking/sleepeating.     Risk Parameters:  Patient reports no suicidal ideation  Patient reports no homicidal ideation  Patient reports no self-injurious behavior  Patient reports no violent behavior    Exam (detailed: at least 9 elements; comprehensive: all 15 elements)    Constitutional  Vitals:  Most recent vital signs, dated less than 90 days prior to this appointment, were reviewed:     Vitals - 1 value per visit 5/2/2017 7/24/2017 8/10/2017   SYSTOLIC 158 161 138   DIASTOLIC 91 94 84   PULSE 98 103 95   Weight (lb) 155.8 152 152   Weight (kg)  "70.67 68.947 68.947   HEIGHT 5' 0" 5' 0" 5' 0"   BODY MASS INDEX 30.43 29.69 29.69   VISIT REPORT      Pain Score   6        Vitals - 1 value per visit 9/28/2016 10/20/2016 12/7/2016   SYSTOLIC 124 172 145   DIASTOLIC 86 90 78   PULSE 60 99 99   Weight (lb) 163.36 164 160.7   HEIGHT 5' 0" 5' 0" 5' 0"   BODY MASS INDEX 31.9 32.03 31.38   VISIT REPORT      Pain Score  5  5        General:  unremarkable, age appropriate, well dressed, neatly groomed. Not wearing make-up today (normal for her with Islam/charismatic elin)     Musculoskeletal  Muscle Strength/Tone:  no tremor, no tic    Gait & Station:  non-ataxic      Psychiatric  Speech:  normal tone, normal rate, normal pitch, normal volume, spontaneous    Mood & Affect:  "Not so good"  congruent and appropriate to situation/content. Appropriately tearful when discussing aspects of grief.    Thought Process:  normal and logical, goal-directed    Associations:  intact    Thought Content:  normal, no suicidality, no homicidality, delusions, or paranoia    Insight & Judgement:  good, intact  adequate to circumstances, age appropriate, good    Orientation:  grossly intact, person, place, time/date, situation    Memory:  intact for content of interview, grossly intact    Language:  grossly intact    Attention Span & Concentration:  able to focus    Fund of Knowledge:  intact and appropriate to age and level of education, familiar with aspects of current personal life    Assessment and Diagnosis    Status/Progress: Based on the examination today, the patient's problem(s) is/are adequately, but not ideally controlled. New problems have not been presented today. Comorbidities are complicating management of the primary condition. There are no active rule-out diagnoses for this patient at this time.    General Impression:   Major Depressive Disorder, Recurrent: Mild  Dysthymic Disorder   Generalized Anxiety Disorder   Chronic Insomnia   Also, multiple sclerosis, chronic " "pain, CHELSEA    Intervention/Counseling/Treatment Plan    Continue all current meds:  Continue Cymbalta 120 mg daily  Continue Wellbutrin  mg total daily.  Pt has denied Hx of seizures.     Continue Xanax 0.5 mg BID prn anxiety  Pt can take OTC Benadryl or melatonin for sleep.   Also, continue Adderall immediate release 20 mg for treatment of fatigue/focus/concentration and treatment resistant depression.  I have recommended she take no > 60 mg max per day, preferably less.  Three Rx for Adderall 20 mg, each for a 30 day supply with no refills & with "Do not fill until" dates posted accordingly, were given to patient.   Encourage individual psychotherapy for support. Patient had been meeting with LCSW in Neurology clinic; she can continue this (if insurance allows) or consider f/u with Dr. Nery Toledo or other therapist in our clinic.        Additional Notes:  Will obtain EKG, as no study has been found in computer medical record since 8/1/2008.       Return to Clinic: 3 months, or sooner prn.         ADDENDUM -- EKG:  Test Reason : Z79.899  Vent. Rate : 077 BPM     Atrial Rate : 077 BPM     P-R Int : 136 ms          QRS Dur : 090 ms      QT Int : 406 ms       P-R-T Axes : 033 000 073 degrees     QTc Int : 459 ms    Normal sinus rhythm  Poor R wave progression  Abnormal ECG  When compared with ECG of 21-JUL-2008 08:45,  Nonspecific T wave abnormality no longer evident in Inferior leads  QT has lengthened  Confirmed by Marty VEE, Yaneli ([id number]) on 8/11/2017 4:36:19 PM    "

## 2017-08-16 ENCOUNTER — PATIENT MESSAGE (OUTPATIENT)
Dept: PSYCHIATRY | Facility: CLINIC | Age: 57
End: 2017-08-16

## 2017-08-21 ENCOUNTER — PATIENT MESSAGE (OUTPATIENT)
Dept: NEUROLOGY | Facility: CLINIC | Age: 57
End: 2017-08-21

## 2017-08-30 ENCOUNTER — TELEPHONE (OUTPATIENT)
Dept: NEUROLOGY | Facility: CLINIC | Age: 57
End: 2017-08-30

## 2017-08-30 NOTE — TELEPHONE ENCOUNTER
----- Message from Shraddha Sun MA sent at 8/28/2017  1:26 PM CDT -----  Received Disability Form form [employer] / Claims Department on 8/28/2017.  Requesting additional information.  Placed in DV folder

## 2017-09-19 ENCOUNTER — PATIENT MESSAGE (OUTPATIENT)
Dept: NEUROLOGY | Facility: CLINIC | Age: 57
End: 2017-09-19

## 2017-09-26 ENCOUNTER — TELEPHONE (OUTPATIENT)
Dept: NEUROLOGY | Facility: CLINIC | Age: 57
End: 2017-09-26

## 2017-10-27 RX ORDER — TRAMADOL HYDROCHLORIDE 50 MG/1
TABLET ORAL
Qty: 540 TABLET | Refills: 0 | Status: SHIPPED | OUTPATIENT
Start: 2017-10-27 | End: 2018-02-14 | Stop reason: SDUPTHER

## 2017-11-02 ENCOUNTER — OFFICE VISIT (OUTPATIENT)
Dept: PSYCHIATRY | Facility: CLINIC | Age: 57
End: 2017-11-02
Payer: COMMERCIAL

## 2017-11-02 VITALS
HEIGHT: 60 IN | BODY MASS INDEX: 29.45 KG/M2 | WEIGHT: 150 LBS | SYSTOLIC BLOOD PRESSURE: 144 MMHG | DIASTOLIC BLOOD PRESSURE: 73 MMHG | HEART RATE: 104 BPM

## 2017-11-02 DIAGNOSIS — F41.1 GENERALIZED ANXIETY DISORDER: ICD-10-CM

## 2017-11-02 DIAGNOSIS — F34.1 DYSTHYMIC DISORDER: ICD-10-CM

## 2017-11-02 DIAGNOSIS — F41.1 GAD (GENERALIZED ANXIETY DISORDER): ICD-10-CM

## 2017-11-02 DIAGNOSIS — R53.82 CHRONIC FATIGUE: ICD-10-CM

## 2017-11-02 DIAGNOSIS — F33.0 MDD (MAJOR DEPRESSIVE DISORDER), RECURRENT EPISODE, MILD: Primary | ICD-10-CM

## 2017-11-02 DIAGNOSIS — F51.04 CHRONIC INSOMNIA: ICD-10-CM

## 2017-11-02 PROCEDURE — 90833 PSYTX W PT W E/M 30 MIN: CPT | Mod: S$GLB,,, | Performed by: PSYCHIATRY & NEUROLOGY

## 2017-11-02 PROCEDURE — 99999 PR PBB SHADOW E&M-EST. PATIENT-LVL III: CPT | Mod: PBBFAC,,, | Performed by: PSYCHIATRY & NEUROLOGY

## 2017-11-02 PROCEDURE — 99213 OFFICE O/P EST LOW 20 MIN: CPT | Mod: S$GLB,,, | Performed by: PSYCHIATRY & NEUROLOGY

## 2017-11-02 RX ORDER — DEXTROAMPHETAMINE SACCHARATE, AMPHETAMINE ASPARTATE, DEXTROAMPHETAMINE SULFATE AND AMPHETAMINE SULFATE 5; 5; 5; 5 MG/1; MG/1; MG/1; MG/1
1 TABLET ORAL 2 TIMES DAILY
Qty: 60 TABLET | Refills: 0 | Status: SHIPPED | OUTPATIENT
Start: 2017-11-02 | End: 2018-02-01

## 2017-11-02 RX ORDER — DEXTROAMPHETAMINE SACCHARATE, AMPHETAMINE ASPARTATE, DEXTROAMPHETAMINE SULFATE AND AMPHETAMINE SULFATE 5; 5; 5; 5 MG/1; MG/1; MG/1; MG/1
1 TABLET ORAL 2 TIMES DAILY
Qty: 60 TABLET | Refills: 0 | Status: SHIPPED | OUTPATIENT
Start: 2017-12-02 | End: 2018-02-01 | Stop reason: SDUPTHER

## 2017-11-02 RX ORDER — ALPRAZOLAM 0.5 MG/1
0.5 TABLET ORAL 2 TIMES DAILY PRN
Qty: 180 TABLET | Refills: 1 | Status: SHIPPED | OUTPATIENT
Start: 2017-11-02 | End: 2018-05-07 | Stop reason: SDUPTHER

## 2017-11-02 RX ORDER — MELOXICAM 15 MG/1
15 TABLET ORAL DAILY
COMMUNITY
End: 2018-03-05 | Stop reason: SDUPTHER

## 2017-11-02 NOTE — PROGRESS NOTES
"Outpatient Psychiatry Follow-Up Visit (MD/NP)  11/2/2017    Session Length:  30 minutes (E&M plus psychotherapy)     Clinical Status of Patient: Outpatient (Ambulatory)    Chief Complaint: Karen D Eleser is a 57 y.o. female who presents today for follow-up of chronic depression and anxiety.    Met with patient.     Interval History and Content of Current Session:  Interim Events/Subjective Report/Content of Current Session:  First f/u appt with me since 8/10/2017.  Pt had seen Dr. Tash Clark in the past; last appointment was on 9/28/2015 (this note was reviewed).    Plan from last session: "Continue Cymbalta 120 mg daily  Continue Wellbutrin  mg total daily.  Pt has denied Hx of seizures.     Continue Xanax 0.5 mg BID prn anxiety  Pt can take OTC Benadryl or melatonin for sleep.   Also, continue Adderall immediate release 20 mg for treatment of fatigue/focus/concentration and treatment resistant depression.  I have recommended she take no > 60 mg max per day, preferably less.  Three Rx for Adderall 20 mg, each for a 30 day supply with no refills & with "Do not fill until" dates posted accordingly, were given to patient."      "I'm doing OK".     In 8/17 she went to [place]; she toured with a group of other women.  It was a 4 week trip.  This was organized through an tourist agency based on line.  The big event was in [employer] -- it was a convention that had actors and actresses from her favorite TV show.  She also saw a lot of tourist sites.  "The last night I was so depressed".  "I was going home to be alone again".  She notes she tended to be late to things.  She even missed the bus one morning (it was actually her birthday).  She states she walked a lot and was often tired in the evenings.      Previously, she had to stop taking the Ambien because it was causing visual hallucinations and illusions at night (i.e., TV cord looks like a snake), as well as sleepwalking and sleepeating " "behaviors.   She has been using 50 mg Benadryl -- it seems to help OK with sleep.    Mood has generally been OK.  "I still have good days and bad days".           She states she enjoys reading and watching certain TV shows, particularly historical based fiction.    She has a harder time reading since she was Dx with MS, as it has affected her ability to concentrate.    She states she has joined on line groups for various interests.     She enjoys doing things outdoors in cool weather.  She likes to travel.       Her main physical complaint is she has chronic painful neuropathy in her feet, legs due to lumbar disc herniation.    She states she has had problems with verbal expression of words that she wants to say (she is on gabapentin, which could have an effect on cognition).        Her father had endocarditis when he was younger.  He then had to have a pacemaker, then dual pacemaker/defibrillator.  He also had 3 sisters (her aunts) who all had heart disease (I did not asked if they smoked).  He had 3 older brothers who have been healthy.  She denies heart disease on her mother's side.    Her sister  from a massive MI at age 57 yo.  However, she abused pills and OTC meds and had gone to rehab at [facility] just months prior to her death.  She notes she and her sister were very close; they even lived together for a while.    Pt denies ever having problems with her heart.  She states she never had a stress test, but she has had "plenty of" EKG's, the last one was  (there are telemetry strips in [place]).      She has lost contact with a lot of her previous friends from work.    She does keep in touch with her best friend, who now lives north of [place].  They keep in touch by phone.    She is also researching her family roots/ancestry.  She has noted before that she has some ancestors from [place], [place].      She thinks her depression has been doing somewhat better.   She continues to take Adderall; however, it " "tends to "hype" her up a lot, especially if she takes the max 3 tablets during the day. "I feel wired outside, but inside I still feel tired".      Current SIGECAPS:    Sleep -- has problems falling asleep; must take an Ambien and Xanax to be able to fall asleep.   Interests -- low ("I don't have anybody to do anything with" -- very few friends; had done a lot of things with her sister)    Guilt -- for "enabling my sister for so many years", not showing tough love.  Similar feelings with her mom (had to say "no" to her; feelings regarding circumstances surrounding her death).    Energy -- very low; always fatigued   Concentration -- poor due to MS; has a hard time reading   Appetite -- "not very good"; "I never thought I would say I am too tired to eat".    Psychomotor --  Somewhat decreased   Suicidal ideation -- denies   She does not feel optimistic about the future.      She has been rejected twice for SS disability.  She had an  the second time and they went before the , but the disability was still declined.  She is still working towards disability, mainly from MS standpoint.    She states she still has sporadic flare ups of MS that tend to keep her basically confined to bed.   She states she has been Dx with CHELSEA.  She has tried to use the CPAP nightly, but it is cumbersome and uncomfortable, so she has problems sleeping with it in place.  She states she still has a fair amount of fatigue during the day.   She also has anxiety attacks (not full panic); tends to have them in crowds, but sometimes she cannot state why.    She had been treated by Dr. Royce Pina for MS until his death in 2006.  She has also been treated by other neurologists.  Dr. Maia Minaya is her neurologist currently.      Past psych meds: celexa, lexapro, and effexor in the past.    From previous sessions:  Regarding her mom and sister:  Mother passed: 9/20/2015. Sister passed: 6/18/2014. Sister had problems with drugs and " "alcohol, was a nurse and lost her nursing license.  She  from a massive MI while in the group home house after substance rehab.  She was close to them both.  She had enjoyed going to the movies, going out to eat, travel, etc.  However, she had always done these things with her sister, who  in 2014.  She has no one in her life that she is close to compared to her sister in the past.  She has a brother who is 6 years younger -- "he can be very abrasive, he talks real loud".  "He also has his life, his children".  She also notes he does not understand or appreciate her mental illness.   --Regarding past intimate relationships: She has had just one very serious relationship that lasted for 10 years.  She states the relationship ended because he lied to her & told her that he worked and stayed at his mother's house, caring for her.  However, he did not work, lived off his mother's SS, then was kicked out of the mom's trailer by his siblings after she , so he was homeless for a while.  She thinks he may be living with his ex-wife now.  She states this happened about 5 years ago.    Since then, she had met 2 guys on line ([employer]) -- went out with each of them a few times, but nothing ever came of this.    She notes her mother was very controlling.  Pt also was shy growing up.  Pt denies ever being sexually molested.  She denies ever having problems with alcohol or drugs.  She is out on disability from work ([employer]) as a .    She is still getting a check through her work disability co.     PSYCHOTHERAPY ADD-ON +[id number]   30 (16-37*) minutes   · Target symptoms: depression, anxiety, fatigue  · Why chosen therapy is appropriate versus another modality: relevant to diagnosis, patient responds to this modality  · Outcome monitoring methods: self-report  · Therapeutic intervention type: supportive psychotherapy  · Topics discussed/themes: SEE ABOVE-- illness/death of loved ones, stress " related to medical comorbidities, life stage transitional issues, social isolation, self care/nurturing.  · The patient's response to the intervention is accepting.   · The patient's progress toward treatment goals is fair.  · Duration of intervention: 20 minutes      Review of Systems   · PSYCHIATRIC: Pertinant items are noted in the narrative.  · CONSTITUTIONAL: Some recent wt loss.  Chronic fatigue.     · MUSCULOSKELETAL: + chronic pain in her feet/legs  · NEUROLOGIC: No weakness, sensory changes, seizures, confusion, memory loss, tremor or other abnormal movements.  · ENDOCRINE: No polydipsia or polyuria.  · INTEGUMENTARY: No rashes or lacerations.  · EYES: No exophthalmos, jaundice or blindness.  · ENT: No dizziness, tinnitus or hearing loss.  · RESPIRATORY: No shortness of breath.  · CARDIOVASCULAR: No tachycardia or chest pain.  · GASTROINTESTINAL: No nausea, vomiting, pain, constipation or diarrhea.  · GENITOURINARY: No frequency, dysuria.        Past Medical, Family and Social History: The patient's past medical, family and social history have been reviewed and updated as appropriate within the electronic medical record -- see encounter notes.    Current Medications:    Current Outpatient Prescriptions:     meloxicam (MOBIC) 15 MG tablet, Take 15 mg by mouth once daily., Disp: , Rfl:     ALPRAZolam (XANAX) 0.5 MG tablet, Take 1 tablet (0.5 mg total) by mouth 2 (two) times daily as needed for Anxiety., Disp: 180 tablet, Rfl: 1    b complex vitamins (VITAMIN B COMPLEX) tablet, Take by mouth. 1 Tablet Oral Every day, Disp: , Rfl:     buPROPion (WELLBUTRIN XL) 300 MG 24 hr tablet, Take 1 tablet (300 mg total) by mouth once daily. Take with food., Disp: 90 tablet, Rfl: 1    cholecalciferol, vitamin D3, (VITAMIN D3) 5,000 unit Tab, Take 5,000 Units by mouth once daily. , Disp: , Rfl:     coenzyme Q10 (CO Q-10) 400 mg Cap, Take by mouth., Disp: , Rfl:     COPAXONE 40 mg/mL Syrg injection, INJECT 40MG  SUBCUTANEOUSLY THREE TIMES A WEEK, Disp: 12 Syringe, Rfl: 4    coQ10, ubiquinol, 200 mg Cap, Take 1 capsule by mouth once daily., Disp: , Rfl:     dextroamphetamine-amphetamine (ADDERALL) 20 mg tablet, Take 1 tablet by mouth 2 (two) times daily., Disp: 60 tablet, Rfl: 0    [START ON 12/2/2017] dextroamphetamine-amphetamine (ADDERALL) 20 mg tablet, Take 1 tablet by mouth 2 (two) times daily., Disp: 60 tablet, Rfl: 0    diclofenac sodium (VOLTAREN) 1 % Gel, Apply 2 g topically 4 (four) times daily. (Patient taking differently: Apply 2 g topically continuous prn. ), Disp: 5 Tube, Rfl: 3    duloxetine (CYMBALTA) 60 MG capsule, Take 2 capsules (120 mg total) by mouth once daily. 2 Capsule, Delayed Release(E.C.) Oral Every day, Disp: 180 capsule, Rfl: 1    gabapentin (NEURONTIN) 600 MG tablet, TAKE 2 TABLETS EVERY MORNING, 1 TABLET AT NOON AND 2 TABLETS AT BEDTIME, Disp: 450 tablet, Rfl: 1    latanoprost 0.005 % ophthalmic solution, Place 1 drop into both eyes every evening., Disp: 2.5 mL, Rfl: 5    lovastatin (MEVACOR) 40 MG tablet, TAKE 1 TABLET (40 MG TOTAL) BY MOUTH EVERY EVENING., Disp: 90 tablet, Rfl: 3    multivitamin (THERAGRAN) per tablet, Take by mouth. 1 Tablet Oral Every day, Disp: , Rfl:     PREVIDENT 5000 SENSITIVE 1.1-5 % Pste, Apply topically once daily. , Disp: , Rfl:     rosuvastatin (CRESTOR) 10 MG tablet, Take 1 tablet (10 mg total) by mouth every evening., Disp: 30 tablet, Rfl: 6    tizanidine (ZANAFLEX) 2 MG tablet, TAKE 1/2 TABLET BY MOUTH TWICE A DAY THEN 2 TABS AT BEDTIME AS NEEDED, Disp: 270 tablet, Rfl: 1    traMADol (ULTRAM) 50 mg tablet, TAKE 1 TO 2 TABLETS BY MOUTH EVERY 6 HOURS AS NEEDED FOR PAIN, Disp: 540 tablet, Rfl: 0    VESICARE 5 mg tablet, TAKE 2 TABLETS (10 MG TOTAL) BY MOUTH ONCE DAILY., Disp: 180 tablet, Rfl: 1     Compliance: yes    Side effects: Ambien -- visual hallucinations and illusions, sleepwalking/sleepeating.     Risk Parameters:  Patient reports no suicidal  "ideation  Patient reports no homicidal ideation  Patient reports no self-injurious behavior  Patient reports no violent behavior    Exam (detailed: at least 9 elements; comprehensive: all 15 elements)    Constitutional  Vitals:  Most recent vital signs, dated less than 90 days prior to this appointment, were reviewed:     Vitals - 1 value per visit 7/24/2017 8/10/2017 11/2/2017   SYSTOLIC 161 138 144   DIASTOLIC 94 84 73   PULSE 103 95 104   Weight (lb) 152 152 150   Weight (kg) 68.947 68.947 68.04   HEIGHT 5' 0" 5' 0" 5' 0"   BODY MASS INDEX 29.69 29.69 29.29   VISIT REPORT      Pain Score  6         Vitals - 1 value per visit 9/28/2016 10/20/2016 12/7/2016   SYSTOLIC 124 172 145   DIASTOLIC 86 90 78   PULSE 60 99 99   Weight (lb) 163.36 164 160.7   HEIGHT 5' 0" 5' 0" 5' 0"   BODY MASS INDEX 31.9 32.03 31.38   VISIT REPORT      Pain Score  5  5        General:  unremarkable, age appropriate, well dressed, neatly groomed. Not wearing make-up today (normal for her with [employer]/charismatic elin)     Musculoskeletal  Muscle Strength/Tone:  no tremor, no tic    Gait & Station:  non-ataxic      Psychiatric  Speech:  normal tone, normal rate, normal pitch, normal volume, spontaneous    Mood & Affect:  "OK"  congruent and appropriate to situation/content. Appropriately tearful when discussing aspects of grief.    Thought Process:  normal and logical, goal-directed    Associations:  intact    Thought Content:  normal, no suicidality, no homicidality, delusions, or paranoia    Insight & Judgement:  good, intact  adequate to circumstances, age appropriate, good    Orientation:  grossly intact, person, place, time/date, situation    Memory:  intact for content of interview, grossly intact    Language:  grossly intact    Attention Span & Concentration:  able to focus    Fund of Knowledge:  intact and appropriate to age and level of education, familiar with aspects of current personal life      Prior EKG:  Test Reason : " "Z79.899  Vent. Rate : 077 BPM     Atrial Rate : 077 BPM     P-R Int : 136 ms          QRS Dur : 090 ms      QT Int : 406 ms       P-R-T Axes : 033 000 073 degrees     QTc Int : 459 ms    Normal sinus rhythm  Poor R wave progression  Abnormal ECG  When compared with ECG of 21-JUL-2008 08:45,  Nonspecific T wave abnormality no longer evident in Inferior leads  QT has lengthened  Confirmed by Marty VEE, Yaneli ([id number]) on 8/11/2017 4:36:19 PM      Assessment and Diagnosis    Status/Progress: Based on the examination today, the patient's problem(s) is/are adequately, but not ideally controlled. New problems have not been presented today. Comorbidities (chronic neuropathic pain) are complicating management of the primary condition. There are no active rule-out diagnoses for this patient at this time.    General Impression:   Major Depressive Disorder, Recurrent: Mild  Dysthymic Disorder   Generalized Anxiety Disorder   Chronic Insomnia   Chronic fatigue  Also, multiple sclerosis, chronic pain, CHELSEA    Intervention/Counseling/Treatment Plan    Continue all current meds:  Continue Cymbalta 120 mg daily  Continue Wellbutrin  mg total daily.  Pt has denied Hx of seizures.     Continue Xanax 0.5 mg BID prn anxiety  Pt can take OTC Benadryl or melatonin for sleep.   Also, continue Adderall immediate release 20 mg for treatment of fatigue/focus/concentration and treatment resistant depression.  I have recommended she take no > 60 mg max per day, preferably less.  Three Rx for Adderall 20 mg, each for a 30 day supply with no refills & with "Do not fill until" dates posted accordingly, were given to patient.   Encourage individual psychotherapy for support. Patient had been meeting with LCSW in Neurology clinic; she can continue this (if insurance allows) or consider f/u with Dr. Nery Toledo or other therapist in our clinic.        Additional Notes:  N/A      Return to Clinic: 3 months, or sooner prn.             "

## 2017-11-07 ENCOUNTER — PATIENT MESSAGE (OUTPATIENT)
Dept: NEUROLOGY | Facility: CLINIC | Age: 57
End: 2017-11-07

## 2017-11-09 ENCOUNTER — TELEPHONE (OUTPATIENT)
Dept: NEUROLOGY | Facility: CLINIC | Age: 57
End: 2017-11-09

## 2017-11-09 NOTE — TELEPHONE ENCOUNTER
Faxed pt's [employer] Premium Waiver forms to [phone].  Mailed originals to pt and kept copy for chart.

## 2017-11-10 ENCOUNTER — TELEPHONE (OUTPATIENT)
Dept: NEUROLOGY | Facility: CLINIC | Age: 57
End: 2017-11-10

## 2017-11-10 NOTE — TELEPHONE ENCOUNTER
Left message and call back number.       - Message from Jacques García PsyD sent at 11/7/2017 10:35 AM CST -----  Can we make sure this patient gets scheduled at the next available. Thanks!    ----- Message -----  From: Diamond Barragan LCSW  Sent: 11/7/2017  10:01 AM  To: Jacques García PsyD, Kamille [facility]anna [facility]ille,  Just checking to see when this pt will be scheduled for NP testing.  I sent a reminder a few months ago but did see that the pt had been contacted, yet.  Perhaps she's on a wait list?  I know she's asked about the testing a few times.  Thanks!  Diamond

## 2017-11-15 DIAGNOSIS — R25.2 SPASTICITY: ICD-10-CM

## 2017-11-15 DIAGNOSIS — G35 MULTIPLE SCLEROSIS: ICD-10-CM

## 2017-11-15 RX ORDER — TIZANIDINE 2 MG/1
TABLET ORAL
Qty: 270 TABLET | Refills: 1 | Status: SHIPPED | OUTPATIENT
Start: 2017-11-15 | End: 2018-06-18 | Stop reason: SDUPTHER

## 2017-11-20 RX ORDER — GABAPENTIN 600 MG/1
TABLET ORAL
Qty: 450 TABLET | Refills: 1 | OUTPATIENT
Start: 2017-11-20

## 2017-11-21 RX ORDER — GABAPENTIN 600 MG/1
TABLET ORAL
Qty: 450 TABLET | Refills: 1 | OUTPATIENT
Start: 2017-11-21

## 2017-12-20 RX ORDER — ROSUVASTATIN CALCIUM 10 MG/1
10 TABLET, COATED ORAL NIGHTLY
Qty: 30 TABLET | Refills: 1 | Status: SHIPPED | OUTPATIENT
Start: 2017-12-20 | End: 2018-06-17 | Stop reason: SDUPTHER

## 2017-12-21 RX ORDER — GABAPENTIN 600 MG/1
TABLET ORAL
Qty: 450 TABLET | Refills: 0 | Status: SHIPPED | OUTPATIENT
Start: 2017-12-21 | End: 2018-03-21 | Stop reason: SDUPTHER

## 2017-12-28 ENCOUNTER — OFFICE VISIT (OUTPATIENT)
Dept: RHEUMATOLOGY | Facility: CLINIC | Age: 57
End: 2017-12-28
Payer: COMMERCIAL

## 2017-12-28 VITALS
HEART RATE: 91 BPM | BODY MASS INDEX: 29.69 KG/M2 | WEIGHT: 152 LBS | DIASTOLIC BLOOD PRESSURE: 75 MMHG | SYSTOLIC BLOOD PRESSURE: 120 MMHG

## 2017-12-28 DIAGNOSIS — M15.9 GENERALIZED OSTEOARTHRITIS: ICD-10-CM

## 2017-12-28 DIAGNOSIS — M79.7 FIBROMYALGIA: Primary | ICD-10-CM

## 2017-12-28 DIAGNOSIS — G35 MULTIPLE SCLEROSIS: ICD-10-CM

## 2017-12-28 PROCEDURE — 99213 OFFICE O/P EST LOW 20 MIN: CPT | Mod: S$GLB,,, | Performed by: INTERNAL MEDICINE

## 2017-12-28 PROCEDURE — 99999 PR PBB SHADOW E&M-EST. PATIENT-LVL III: CPT | Mod: PBBFAC,,, | Performed by: INTERNAL MEDICINE

## 2018-01-10 ENCOUNTER — HOSPITAL ENCOUNTER (OUTPATIENT)
Dept: RADIOLOGY | Facility: HOSPITAL | Age: 58
Discharge: HOME | End: 2018-01-10
Attending: PSYCHIATRY & NEUROLOGY
Payer: COMMERCIAL

## 2018-01-10 DIAGNOSIS — G35 MULTIPLE SCLEROSIS: ICD-10-CM

## 2018-01-10 PROCEDURE — A9585 GADOBUTROL INJECTION: HCPCS | Mod: PO | Performed by: PSYCHIATRY & NEUROLOGY

## 2018-01-10 PROCEDURE — 70553 MRI BRAIN STEM W/O & W/DYE: CPT | Mod: TC,PO

## 2018-01-10 PROCEDURE — 70553 MRI BRAIN STEM W/O & W/DYE: CPT | Mod: 26,,, | Performed by: RADIOLOGY

## 2018-01-10 PROCEDURE — 25500020 PHARM REV CODE 255: Mod: PO | Performed by: PSYCHIATRY & NEUROLOGY

## 2018-01-10 RX ORDER — GADOBUTROL 604.72 MG/ML
10 INJECTION INTRAVENOUS
Status: DISCONTINUED | OUTPATIENT
Start: 2018-01-10 | End: 2018-01-10

## 2018-01-10 RX ORDER — GADOBUTROL 604.72 MG/ML
6 INJECTION INTRAVENOUS
Status: COMPLETED | OUTPATIENT
Start: 2018-01-10 | End: 2018-01-10

## 2018-01-10 RX ADMIN — GADOBUTROL 6 ML: 604.72 INJECTION INTRAVENOUS at 01:01

## 2018-01-17 ENCOUNTER — PATIENT MESSAGE (OUTPATIENT)
Dept: OPTOMETRY | Facility: CLINIC | Age: 58
End: 2018-01-17

## 2018-01-19 DIAGNOSIS — G35 MULTIPLE SCLEROSIS: ICD-10-CM

## 2018-01-19 RX ORDER — MELOXICAM 15 MG/1
15 TABLET ORAL DAILY
Qty: 90 TABLET | Refills: 2 | Status: SHIPPED | OUTPATIENT
Start: 2018-01-19 | End: 2018-05-07 | Stop reason: SDUPTHER

## 2018-01-19 RX ORDER — GLATIRAMER 40 MG/ML
INJECTION, SOLUTION SUBCUTANEOUS
Qty: 36 SYRINGE | Refills: 1 | Status: SHIPPED | OUTPATIENT
Start: 2018-01-19 | End: 2018-10-17 | Stop reason: SDUPTHER

## 2018-01-26 ENCOUNTER — TELEPHONE (OUTPATIENT)
Dept: INTERNAL MEDICINE | Facility: CLINIC | Age: 58
End: 2018-01-26

## 2018-01-26 DIAGNOSIS — Z12.31 ENCOUNTER FOR SCREENING MAMMOGRAM FOR BREAST CANCER: Primary | ICD-10-CM

## 2018-01-26 NOTE — TELEPHONE ENCOUNTER
----- Message from Krystal Dawson sent at 1/26/2018  1:48 PM CST -----  Contact: self/[phone]  Patient called in regards needing an orders for mammogram. Patient want an appointment for 03/05/18. Please call and advise.       Thank you!!!

## 2018-02-01 ENCOUNTER — OFFICE VISIT (OUTPATIENT)
Dept: OPTOMETRY | Facility: CLINIC | Age: 58
End: 2018-02-01
Payer: COMMERCIAL

## 2018-02-01 ENCOUNTER — OFFICE VISIT (OUTPATIENT)
Dept: PSYCHIATRY | Facility: CLINIC | Age: 58
End: 2018-02-01
Payer: COMMERCIAL

## 2018-02-01 VITALS
BODY MASS INDEX: 31.84 KG/M2 | SYSTOLIC BLOOD PRESSURE: 145 MMHG | WEIGHT: 162.19 LBS | HEART RATE: 88 BPM | DIASTOLIC BLOOD PRESSURE: 78 MMHG | HEIGHT: 60 IN

## 2018-02-01 DIAGNOSIS — H21.562 AFFERENT PUPILLARY DEFECT OF LEFT EYE: ICD-10-CM

## 2018-02-01 DIAGNOSIS — H40.053 OCULAR HYPERTENSION, BILATERAL: Primary | ICD-10-CM

## 2018-02-01 DIAGNOSIS — Z98.890 HISTORY OF LASER REFRACTIVE SURGERY: ICD-10-CM

## 2018-02-01 DIAGNOSIS — F34.1 DYSTHYMIC DISORDER: ICD-10-CM

## 2018-02-01 DIAGNOSIS — F41.1 GAD (GENERALIZED ANXIETY DISORDER): ICD-10-CM

## 2018-02-01 DIAGNOSIS — R53.82 CHRONIC FATIGUE: ICD-10-CM

## 2018-02-01 DIAGNOSIS — F51.04 CHRONIC INSOMNIA: ICD-10-CM

## 2018-02-01 DIAGNOSIS — H52.203 ASTIGMATISM OF BOTH EYES, UNSPECIFIED TYPE: ICD-10-CM

## 2018-02-01 DIAGNOSIS — F33.1 MAJOR DEPRESSIVE DISORDER, RECURRENT EPISODE, MODERATE: ICD-10-CM

## 2018-02-01 DIAGNOSIS — H52.4 PRESBYOPIA OF BOTH EYES: ICD-10-CM

## 2018-02-01 DIAGNOSIS — F33.0 MDD (MAJOR DEPRESSIVE DISORDER), RECURRENT EPISODE, MILD: Primary | ICD-10-CM

## 2018-02-01 PROCEDURE — 3008F BODY MASS INDEX DOCD: CPT | Mod: S$GLB,,, | Performed by: PSYCHIATRY & NEUROLOGY

## 2018-02-01 PROCEDURE — 92014 COMPRE OPH EXAM EST PT 1/>: CPT | Mod: S$GLB,,, | Performed by: OPTOMETRIST

## 2018-02-01 PROCEDURE — 90833 PSYTX W PT W E/M 30 MIN: CPT | Mod: S$GLB,,, | Performed by: PSYCHIATRY & NEUROLOGY

## 2018-02-01 PROCEDURE — 99999 PR PBB SHADOW E&M-EST. PATIENT-LVL II: CPT | Mod: PBBFAC,,, | Performed by: OPTOMETRIST

## 2018-02-01 PROCEDURE — 99213 OFFICE O/P EST LOW 20 MIN: CPT | Mod: S$GLB,,, | Performed by: PSYCHIATRY & NEUROLOGY

## 2018-02-01 PROCEDURE — 99999 PR PBB SHADOW E&M-EST. PATIENT-LVL III: CPT | Mod: PBBFAC,,, | Performed by: PSYCHIATRY & NEUROLOGY

## 2018-02-01 PROCEDURE — 92015 DETERMINE REFRACTIVE STATE: CPT | Mod: S$GLB,,, | Performed by: OPTOMETRIST

## 2018-02-01 RX ORDER — DEXTROAMPHETAMINE SACCHARATE, AMPHETAMINE ASPARTATE, DEXTROAMPHETAMINE SULFATE AND AMPHETAMINE SULFATE 5; 5; 5; 5 MG/1; MG/1; MG/1; MG/1
1 TABLET ORAL 2 TIMES DAILY
Qty: 60 TABLET | Refills: 0 | Status: SHIPPED | OUTPATIENT
Start: 2018-03-03 | End: 2018-05-07 | Stop reason: SDUPTHER

## 2018-02-01 RX ORDER — DEXTROAMPHETAMINE SACCHARATE, AMPHETAMINE ASPARTATE, DEXTROAMPHETAMINE SULFATE AND AMPHETAMINE SULFATE 5; 5; 5; 5 MG/1; MG/1; MG/1; MG/1
1 TABLET ORAL 2 TIMES DAILY
Qty: 60 TABLET | Refills: 0 | Status: SHIPPED | OUTPATIENT
Start: 2018-02-01 | End: 2018-05-07

## 2018-02-01 RX ORDER — ZALEPLON 10 MG/1
10 CAPSULE ORAL NIGHTLY
Qty: 30 CAPSULE | Refills: 3 | Status: SHIPPED | OUTPATIENT
Start: 2018-02-01 | End: 2018-05-07 | Stop reason: SDUPTHER

## 2018-02-01 RX ORDER — BUPROPION HYDROCHLORIDE 300 MG/1
300 TABLET ORAL DAILY
Qty: 90 TABLET | Refills: 1 | Status: SHIPPED | OUTPATIENT
Start: 2018-02-01 | End: 2018-05-07 | Stop reason: SDUPTHER

## 2018-02-01 NOTE — PROGRESS NOTES
"Outpatient Psychiatry Follow-Up Visit (MD/NP)  02/01/2018    Session Length:  30 minutes (E&M plus psychotherapy)     Clinical Status of Patient: Outpatient (Ambulatory)    Chief Complaint: Karen D Eleser is a 57 y.o. female who presents today for follow-up of chronic depression and anxiety.    Met with patient.     Interval History and Content of Current Session:  Interim Events/Subjective Report/Content of Current Session:  First f/u appt with me since 11/2/2017.  Pt had seen Dr. Tash Clark in the past; last appointment was on 9/28/2015 (this note was reviewed).      Plan from last session: "Continue Cymbalta 120 mg daily  Continue Wellbutrin  mg total daily.  Pt has denied Hx of seizures.     Continue Xanax 0.5 mg BID prn anxiety  Pt can take OTC Benadryl or melatonin for sleep.   Also, continue Adderall immediate release 20 mg for treatment of fatigue/focus/concentration and treatment resistant depression.  I have recommended she take no > 60 mg max per day, preferably less.  Three Rx for Adderall 20 mg, each for a 30 day supply with no refills & with "Do not fill until" dates posted accordingly, were given to patient."      "OK.  Same as always".  She states she has done very little lately.  She states she had a viral illness for about 2 weeks.    I reviewed her MRI from 1/10/18 (she had her annual study due to MS).  It appears unremarkable regarding any changes in the past year.    She has numbness and tingling mainly in her L arm; it can also occur in her R arm as well.      She is able to fall asleep; she just has a hard time falling back to sleep.  She states this has been going on just in past 1 - 2 months.    She states she has been taking Adderall, which "gives me energy to get up an go".  However, pt states she still feels tired.  She states the Adderall still makes her nervous/tense.    I discussed with pt about trial of Sonata to help her fall back asleep -- she agrees to try this med.  " "  Previously, she had to stop taking the Ambien because it was causing visual hallucinations and illusions at night (i.e., TV cord looks like a snake), as well as sleepwalking and sleepeating behaviors.  I told her the Sonata has less potential to cause this problem compared to Ambien.    She has been using 50 mg Benadryl -- it seems to help OK with sleep.    Mood has generally been OK.  "I still have good days and bad days".        She states she enjoys reading and watching certain TV shows, particularly historical based fiction.    She has a harder time reading since she was Dx with MS, as it has affected her ability to concentrate.    She states she has joined on line groups for various interests.     She enjoys doing things outdoors in cool weather.  She likes to travel.       Current SIGECAPS:    Sleep -- has problems falling asleep; must take an Ambien and Xanax to be able to fall asleep or do back to sleep.   Interests -- low ("I don't have anybody to do anything with" -- very few friends; had done a lot of things with her sister)    Guilt -- for "enabling my sister for so many years", not showing tough love.  Similar feelings with her mom (had to say "no" to her; feelings regarding circumstances surrounding her death).    Energy -- low; always fatigued   Concentration -- poor due to MS; has a hard time reading   Appetite -- "not very good"; "I never thought I would say I am too tired to eat".    Psychomotor --  Somewhat decreased   Suicidal ideation -- denies   She does not feel optimistic about the future.     Her main physical complaint is she has chronic painful neuropathy in her feet, legs due to lumbar disc herniation.    She has stated she has had problems with verbal expression of words that she wants to say (she is on gabapentin, which could have an effect on cognition).      Her father had endocarditis when he was younger.  He then had to have a pacemaker, then dual pacemaker/defibrillator.  He also " "had 3 sisters (her aunts) who all had heart disease (I did not asked if they smoked).  He had 3 older brothers who have been healthy.  She denies heart disease on her mother's side.    Her sister  from a massive MI at age 57 yo.  However, she abused pills and OTC meds and had gone to rehab at [facility] just months prior to her death.  She notes she and her sister were very close; they even lived together for a while.    Pt has denied ever having problems with her heart.  She stated she never had a stress test, but she has had "plenty of" EKG's.     She had been treated by Dr. Royce Pina for MS until his death in .  She has also been treated by other neurologists.  Dr. Maia Minaya is her neurologist currently.      Past psych meds: celexa, lexapro, and effexor in the past.    From previous sessions:  Regarding her mom and sister:  Mother passed: 2015. Sister passed: 2014. Sister had problems with drugs and alcohol, was a nurse and lost her nursing license.  She  from a massive MI while in the half-way [place] after substance rehab.  She was close to them both.  She had enjoyed going to the movies, going out to eat, travel, etc.  However, she had always done these things with her sister, who  in 2014.  She has no one in her life that she is close to compared to her sister in the past.  She has a brother who is 6 years younger -- "he can be very abrasive, he talks real loud".  "He also has his life, his children".  She also notes he does not understand or appreciate her mental illness.   --Regarding past intimate relationships: She has had just one very serious relationship that lasted for 10 years.  She states the relationship ended because he lied to her & told her that he worked and stayed at his mother's house, caring for her.  However, he did not work, lived off his mother's SS, then was kicked out of the mom's trailer by his siblings after she , so he was homeless for a " while.  She thinks he may be living with his ex-wife now.  She states this happened about 5 years ago.    Since then, she had met 2 guys on line ([employer]) -- went out with each of them a few times, but nothing ever came of this.    She notes her mother was very controlling.  Pt also was shy growing up.  Pt denies ever being sexually molested.  She denies ever having problems with alcohol or drugs.  She is out on disability from work ([employer]) as a .    She is still getting a check through her work disability co.     PSYCHOTHERAPY ADD-ON [id number]   30 (16-37*) minutes   · Target symptoms: depression, anxiety, fatigue  · Why chosen therapy is appropriate versus another modality: relevant to diagnosis, patient responds to this modality  · Outcome monitoring methods: self-report  · Therapeutic intervention type: supportive psychotherapy  · Topics discussed/themes: SEE ABOVE-- illness/death of loved ones, stress related to medical comorbidities, life stage transitional issues, social isolation, self care/nurturing.  · The patient's response to the intervention is accepting.   · The patient's progress toward treatment goals is fair.  · Duration of intervention: 20 minutes      Review of Systems   · PSYCHIATRIC: Pertinant items are noted in the narrative.  · CONSTITUTIONAL: Some recent wt loss.  Chronic fatigue.     · MUSCULOSKELETAL: + chronic pain in her feet/legs  · NEUROLOGIC: No weakness, sensory changes, seizures, confusion, memory loss, tremor or other abnormal movements.  · ENDOCRINE: No polydipsia or polyuria.  · INTEGUMENTARY: No rashes or lacerations.  · EYES: No exophthalmos, jaundice or blindness.  · ENT: No dizziness, tinnitus or hearing loss.  · RESPIRATORY: No shortness of breath.  · CARDIOVASCULAR: No tachycardia or chest pain.  · GASTROINTESTINAL: No nausea, vomiting, pain, constipation or diarrhea.  · GENITOURINARY: No frequency, dysuria.        Past Medical, Family and Social  History: The patient's past medical, family and social history have been reviewed and updated as appropriate within the electronic medical record -- see encounter notes.    Current Medications:    Current Outpatient Prescriptions:     ALPRAZolam (XANAX) 0.5 MG tablet, Take 1 tablet (0.5 mg total) by mouth 2 (two) times daily as needed for Anxiety., Disp: 180 tablet, Rfl: 1    b complex vitamins (VITAMIN B COMPLEX) tablet, Take by mouth. 1 Tablet Oral Every day, Disp: , Rfl:     buPROPion (WELLBUTRIN XL) 300 MG 24 hr tablet, Take 1 tablet (300 mg total) by mouth once daily. Take with food., Disp: 90 tablet, Rfl: 1    cholecalciferol, vitamin D3, (VITAMIN D3) 5,000 unit Tab, Take 5,000 Units by mouth once daily. , Disp: , Rfl:     coenzyme Q10 (CO Q-10) 400 mg Cap, Take by mouth., Disp: , Rfl:     coQ10, ubiquinol, 200 mg Cap, Take 1 capsule by mouth once daily., Disp: , Rfl:     dextroamphetamine-amphetamine (ADDERALL) 20 mg tablet, Take 1 tablet by mouth 2 (two) times daily., Disp: 60 tablet, Rfl: 0    dextroamphetamine-amphetamine (ADDERALL) 20 mg tablet, Take 1 tablet by mouth 2 (two) times daily., Disp: 60 tablet, Rfl: 0    diclofenac sodium (VOLTAREN) 1 % Gel, Apply 2 g topically 4 (four) times daily. (Patient taking differently: Apply 2 g topically continuous prn. ), Disp: 5 Tube, Rfl: 3    duloxetine (CYMBALTA) 60 MG capsule, Take 2 capsules (120 mg total) by mouth once daily. 2 Capsule, Delayed Release(E.C.) Oral Every day, Disp: 180 capsule, Rfl: 1    gabapentin (NEURONTIN) 600 MG tablet, TAKE 2 TABLETS EVERY MORNING, 1 TABLET AT NOON AND 2 TABLETS AT BEDTIME, Disp: 450 tablet, Rfl: 0    glatiramer (COPAXONE) 40 mg/mL Syrg injection, INJECT 40MG (1 SYRINGE) SUBCUTANEOUSLY THREE TIMES A WEEK, Disp: 36 Syringe, Rfl: 1    latanoprost 0.005 % ophthalmic solution, Place 1 drop into both eyes every evening., Disp: 2.5 mL, Rfl: 5    lovastatin (MEVACOR) 40 MG tablet, TAKE 1 TABLET (40 MG TOTAL) BY  "MOUTH EVERY EVENING., Disp: 90 tablet, Rfl: 3    meloxicam (MOBIC) 15 MG tablet, Take 15 mg by mouth once daily., Disp: , Rfl:     meloxicam (MOBIC) 15 MG tablet, TAKE 1 TABLET (15 MG TOTAL) BY MOUTH ONCE DAILY., Disp: 90 tablet, Rfl: 2    multivitamin (THERAGRAN) per tablet, Take by mouth. 1 Tablet Oral Every day, Disp: , Rfl:     PREVIDENT 5000 SENSITIVE 1.1-5 % Pste, Apply topically once daily. , Disp: , Rfl:     rosuvastatin (CRESTOR) 10 MG tablet, TAKE 1 TABLET (10 MG TOTAL) BY MOUTH EVERY EVENING., Disp: 30 tablet, Rfl: 1    tiZANidine (ZANAFLEX) 2 MG tablet, TAKE 1/2 TABLET BY MOUTH TWICE A DAY THEN 2 TABS AT BEDTIME AS NEEDED, Disp: 270 tablet, Rfl: 1    traMADol (ULTRAM) 50 mg tablet, TAKE 1 TO 2 TABLETS BY MOUTH EVERY 6 HOURS AS NEEDED FOR PAIN, Disp: 540 tablet, Rfl: 0    VESICARE 5 mg tablet, TAKE 2 TABLETS (10 MG TOTAL) BY MOUTH ONCE DAILY., Disp: 180 tablet, Rfl: 1     Compliance: yes    Side effects: Ambien -- visual hallucinations and illusions, sleepwalking/sleepeating.     Risk Parameters:  Patient reports no suicidal ideation  Patient reports no homicidal ideation  Patient reports no self-injurious behavior  Patient reports no violent behavior    Exam (detailed: at least 9 elements; comprehensive: all 15 elements)    Constitutional  Vitals:  Most recent vital signs, dated less than 90 days prior to this appointment, were reviewed:     Vitals - 1 value per visit 12/28/2017 2/1/2018   SYSTOLIC 120 145   DIASTOLIC 75 78   PULSE 91 88   Weight (lb) 152 162.2   Weight (kg) 68.947 73.573   HEIGHT  5' 0"   BODY MASS INDEX 29.69 31.68   VISIT REPORT     Pain Score  6        Vitals - 1 value per visit 7/24/2017 8/10/2017 11/2/2017   SYSTOLIC 161 138 144   DIASTOLIC 94 84 73   PULSE 103 95 104   Weight (lb) 152 152 150   Weight (kg) 68.947 68.947 68.04   HEIGHT 5' 0" 5' 0" 5' 0"   BODY MASS INDEX 29.69 29.69 29.29   VISIT REPORT      Pain Score  6         Vitals - 1 value per visit 9/28/2016 " "10/20/2016 12/7/2016   SYSTOLIC 124 172 145   DIASTOLIC 86 90 78   PULSE 60 99 99   Weight (lb) 163.36 164 160.7   HEIGHT 5' 0" 5' 0" 5' 0"   BODY MASS INDEX 31.9 32.03 31.38   VISIT REPORT      Pain Score  5  5        General:  unremarkable, age appropriate, well dressed, neatly groomed. Not wearing make-up today (normal for her with Mosque/charismatic elin)     Musculoskeletal  Muscle Strength/Tone:  no tremor, no tic    Gait & Station:  non-ataxic      Psychiatric  Speech:  normal tone, normal rate, normal pitch, normal volume, spontaneous    Mood & Affect:  "OK"  congruent and appropriate to situation/content. Appropriately tearful when discussing aspects of grief.    Thought Process:  normal and logical, goal-directed    Associations:  intact    Thought Content:  normal, no suicidality, no homicidality, delusions, or paranoia    Insight & Judgement:  good, intact  adequate to circumstances, age appropriate, good    Orientation:  grossly intact, person, place, time/date, situation    Memory:  intact for content of interview, grossly intact    Language:  grossly intact    Attention Span & Concentration:  able to focus    Fund of Knowledge:  intact and appropriate to age and level of education, familiar with aspects of current personal life      Prior EKG:  Test Reason : Z79.899  Vent. Rate : 077 BPM     Atrial Rate : 077 BPM     P-R Int : 136 ms          QRS Dur : 090 ms      QT Int : 406 ms       P-R-T Axes : 033 000 073 degrees     QTc Int : 459 ms    Normal sinus rhythm  Poor R wave progression  Abnormal ECG  When compared with ECG of 21-JUL-2008 08:45,  Nonspecific T wave abnormality no longer evident in Inferior leads  QT has lengthened  Confirmed by Marty VEE, Yaneli ([id number]) on 8/11/2017 4:36:19 PM      Assessment and Diagnosis    Status/Progress: Based on the examination today, the patient's problem(s) is/are adequately, but not ideally controlled. New problems have not been presented today. " "Comorbidities (chronic neuropathic pain) are complicating management of the primary condition. There are no active rule-out diagnoses for this patient at this time.    General Impression:   Major Depressive Disorder, Recurrent: Mild  Dysthymic Disorder   Generalized Anxiety Disorder   Chronic Insomnia   Chronic fatigue  Also, multiple sclerosis, chronic pain, CHELSEA    Intervention/Counseling/Treatment Plan    Continue all current meds:  Try Sonata (zaleplon) 10 mg one capsule at night to help fall back asleep.  Pt told she can take this med as long as she can sleep for at least 4 hours after dosing.   Continue Cymbalta 120 mg daily  Continue Wellbutrin  mg total daily.  Pt has denied Hx of seizures.     Continue Xanax 0.5 mg BID prn anxiety  Pt can take OTC Benadryl or melatonin for sleep.   Also, continue Adderall immediate release 20 mg for treatment of fatigue/focus/concentration and treatment resistant depression.  I have recommended she take no > 60 mg max per day, preferably less.  Three Rx for Adderall 20 mg, each for a 30 day supply with no refills & with "Do not fill until" dates posted accordingly, were given to patient.   Encourage individual psychotherapy for support. Patient had been meeting with LCSW in Neurology clinic; she can continue this (if insurance allows) or consider f/u with Dr. Nery Toledo or other therapist in our clinic.        Additional Notes:  N/A      Return to Clinic: 3 months, or sooner prn.             "

## 2018-02-01 NOTE — PROGRESS NOTES
"HPI     eye exam    Additional comments: General eye exam and refraction, and IOP check.  Using drops for IOP control/reduction in both eyes.  Not aware of any VA changes in either eye.            Comments   Patient's age: 57 y.o. WF   Occupation: Disabled due to MS   Approximate date of last eye examination:  01/25/2017  Name of last eye doctor seen: Dr. Murray  Wears glasses? Yes - uses OTC reading glasses     If yes, wears  Full-time or part-time?  Part time   Present glasses are: Bifocal, SV Distance, SV Reading?  OTC reading   glasses.       Any problem with VA with glasses?  Not aware of any VA changes since last   visit.  Wears CLs?:  no   Headaches?  no   Eye pain/discomfort?  no                                                                                     Flashes?  no   Floaters?  Occasionally   Diplopia/Double vision?  no   Patient's Ocular History:          Any eye surgeries? LASIK in  2000          Any eye injury?  no          Any treatment for eye disease?  no   Family history of eye disease?  no   Significant patient medical history:         1. Diabetes?  no      If yes, IDDM or NIDDM?  n/a        2. HBP?  no               3. Other (describe):  Multiple Sclerosis, Fibromyalgia    ! OTC eyedrops currently using:  No    ! Prescription eye meds currently using:  No    ! Any history of allergy/adverse reaction to any eye meds used   previously?  no    ! Any history of allergy/adverse reaction to eyedrops used during prior   eye exam(s)? no    ! Any history of allergy/adverse reaction to Novacaine or similar meds?   no    ! Any history of allergy/adverse reaction to Epinephrine or similar meds?   no    ! Patient okay with use of anesthetic eyedrops to check eye pressure?    yes        ! Patient okay with use of eyedrops to dilate pupils today?  yes    !  Allergies/Medications/Medical History/Family History reviewed today?    yes       PD =   65/61   Desired reading distance =  16" (+/-)             "                          Last edited by Tony Murray, OD on 2/1/2018 12:52 PM. (History)            Assessment /Plan     For exam results, see Encounter Report.    1. 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 Astigmatism of both eyes, unspecified type     4. Presbyopia of both eyes     5. History of laser refractive surgery                  S/P LASIK in each eye, with residual astigmatic refractive error in each eye, greater in the left eye than in the right eye.  Satisfactory best-corrected VA in each eye, but somewhat better in the right eye than in the left eye.  Presbyopia consistent with age.  New spectacle lens Rx issued for use as desired.     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  Prior diagnosis of bilateral ocular hypertension. Using Latanoprost 0.005% ophthalmic solution in each eye every night.  IOP within normal range today in each eye.     No evidence of overtly glaucomaotus visual field changes in either eye per Dodd Visual Field (HVF) test done on a previous visit.    OCT retinal nerve fiber layer (RNFL) thickness analysis done on a previous visit, and RNFL thickness found to be within normal range 360° in the right eye, and focally borderline (NS) in the left eye.       Continue Lataoprost in both eyes every night.  Suggest repeat HVF test (24-2 KARO Standard) and repeat OCT RNFL thickness analysis.    Ms. Velazquez to call to schedule tests and follow-up visit with me (Dr. Murray) on the same date.    Repeat refraction in one year.

## 2018-02-01 NOTE — PATIENT INSTRUCTIONS
S/P LASIK in each eye, with residual astigmatic refractive error in each eye, greater in the left eye than in the right eye.  Satisfactory best-corrected VA in each eye, but somewhat better in the right eye than in the left eye.  Presbyopia consistent with age.  New spectacle lens Rx issued for use as desired.     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  Prior diagnosis of bilateral ocular hypertension. Using Latanoprost 0.005% ophthalmic solution in each eye every night.  IOP within normal range today in each eye.     No evidence of overtly glaucomaotus visual field changes in either eye per Dodd Visual Field (HVF) test done on a previous visit.    OCT retinal nerve fiber layer (RNFL) thickness analysis done on a previous visit, and RNFL thickness found to be within normal range 360° in the right eye, and focally borderline (NS) in the left eye.       Continue Lataoprost in both eyes every night.  Suggest repeat HVF test (24-2 KARO Standard) and repeat OCT RNFL thickness analysis.    Ms. Velazquez to call to schedule tests and follow-up visit with me (Dr. Murray) on the same date.    Repeat refraction in one year.

## 2018-02-09 ENCOUNTER — DOCUMENTATION ONLY (OUTPATIENT)
Dept: NEUROLOGY | Facility: CLINIC | Age: 58
End: 2018-02-09

## 2018-02-14 RX ORDER — TRAMADOL HYDROCHLORIDE 50 MG/1
TABLET ORAL
Qty: 240 TABLET | Refills: 0 | Status: SHIPPED | OUTPATIENT
Start: 2018-02-14 | End: 2018-04-13 | Stop reason: SDUPTHER

## 2018-03-05 ENCOUNTER — OFFICE VISIT (OUTPATIENT)
Dept: INTERNAL MEDICINE | Facility: CLINIC | Age: 58
End: 2018-03-05
Payer: COMMERCIAL

## 2018-03-05 VITALS
OXYGEN SATURATION: 98 % | DIASTOLIC BLOOD PRESSURE: 80 MMHG | WEIGHT: 161.38 LBS | HEIGHT: 60 IN | SYSTOLIC BLOOD PRESSURE: 138 MMHG | HEART RATE: 79 BPM | BODY MASS INDEX: 31.68 KG/M2

## 2018-03-05 DIAGNOSIS — F51.04 CHRONIC INSOMNIA: ICD-10-CM

## 2018-03-05 DIAGNOSIS — Z00.00 WELLNESS EXAMINATION: ICD-10-CM

## 2018-03-05 DIAGNOSIS — G35 MULTIPLE SCLEROSIS: ICD-10-CM

## 2018-03-05 DIAGNOSIS — E55.9 VITAMIN D INSUFFICIENCY: ICD-10-CM

## 2018-03-05 DIAGNOSIS — M79.7 FIBROMYALGIA: ICD-10-CM

## 2018-03-05 DIAGNOSIS — M79.2 NEUROPATHIC PAIN: ICD-10-CM

## 2018-03-05 DIAGNOSIS — R53.83 FATIGUE, UNSPECIFIED TYPE: ICD-10-CM

## 2018-03-05 DIAGNOSIS — G47.19 TRANSIENT DISORDER OF INITIATING OR MAINTAINING WAKEFULNESS: ICD-10-CM

## 2018-03-05 DIAGNOSIS — Z00.00 PHYSICAL EXAM: Primary | ICD-10-CM

## 2018-03-05 DIAGNOSIS — M50.20 DISPLACEMENT OF CERVICAL INTERVERTEBRAL DISC WITHOUT MYELOPATHY: ICD-10-CM

## 2018-03-05 PROCEDURE — 99999 PR PBB SHADOW E&M-EST. PATIENT-LVL III: CPT | Mod: PBBFAC,,, | Performed by: INTERNAL MEDICINE

## 2018-03-05 PROCEDURE — 99396 PREV VISIT EST AGE 40-64: CPT | Mod: S$GLB,,, | Performed by: INTERNAL MEDICINE

## 2018-03-05 NOTE — PROGRESS NOTES
Subjective:      Patient ID: Karen D Eleser is a 57 y.o. female.    Chief Complaint: Annual Exam    HPI:  HPI     Patient is on long term disability  for MS and she is followed by Dr. Shanks.      Annual exam: 3/5/2018  Colonoscopy: Cologard 2018 given  Mammogram: patient will make appt, orders in  Gyn: hysterectomy, ovaries remain: may see a Gyn at any time  Optho:  Dr. Murray  Flu: has not gotten it this year  Tetanus: 3/30/2015  Shingles: not recommended: Shingrix discussed  Pneumovax 23 3/30/2015     Family history:  Sister: D56  of massive MI in her sleep  Mother D: in hospital aspiration      Patient has gastric lab band: patient has not had it filled:      Patient Active Problem List   Diagnosis    Multiple sclerosis    Fibromyalgia    Abnormal MRI    Encounter for long-term (current) use of high-risk medication    Myalgia and myositis    Thoracic or lumbosacral neuritis or radiculitis    Lumbar stenosis    Generalized osteoarthritis    DDD (degenerative disc disease), lumbar    Lumbar facet arthropathy    Arthropathy of shoulder region    Cervical pain    Shoulder pain, left    Shoulder bursitis    Displacement of cervical intervertebral disc without myelopathy    Transient disorder of initiating or maintaining wakefulness    Vitamin D insufficiency    Prophylactic immunotherapy    Counseling regarding goals of care    Neuropathic pain    Dysthymic disorder    Fatigue    Spasm    MDD (major depressive disorder), recurrent episode, mild    LIZZETH (generalized anxiety disorder)    Chronic insomnia     Past Medical History:   Diagnosis Date    Arthritis     Chronic insomnia 2016    DDD (degenerative disc disease), lumbar 2013    Depression     Dysthymic disorder 3/30/2015    Fatigue     Fibromyalgia     LIZZETH (generalized anxiety disorder) 2016    Hyperlipidemia     Major depressive disorder, recurrent, moderate 2016    Multiple sclerosis      Past  Surgical History:   Procedure Laterality Date    BELT ABDOMINOPLASTY      BREAST REDUCTION      CHOLECYSTECTOMY      HYSTERECTOMY  08/11/2008    [facility] (endometriosis / fibroids) BC    JOINT REPLACEMENT      LAPAROSCOPIC GASTRIC BANDING      Vaginal cuff revision       Family History   Problem Relation Age of Onset    Fibromyalgia Mother     Depression Mother     Heart disease Father     Drug abuse Sister     Depression Sister     Anxiety disorder Sister     Alcohol abuse Brother     Breast cancer Neg Hx     Colon cancer Neg Hx     Ovarian cancer Neg Hx      Review of Systems   Problems remain:the same, patient will see a doctor for lap band  Objective:     Vitals:    03/05/18 1054 03/05/18 1111   BP: 136/88 138/80   Pulse: 79    SpO2: 98%    Weight: 73.2 kg (161 lb 6 oz)    Height: 5' (1.524 m)    PainSc: 0-No pain      Body mass index is 31.52 kg/m².  Physical Exam   Constitutional: She is oriented to person, place, and time. She appears well-developed and well-nourished. No distress.   HENT:   Right Ear: Tympanic membrane and ear canal normal.   Left Ear: Tympanic membrane and ear canal normal.   Nose: No sinus tenderness or nasal deformity.   Mouth/Throat: No oropharyngeal exudate or posterior oropharyngeal erythema.   Eyes: Conjunctivae, EOM and lids are normal. Pupils are equal, round, and reactive to light.   Neck: Carotid bruit is not present. No thyromegaly present.   Cardiovascular: Normal rate, regular rhythm and intact distal pulses.    Pulmonary/Chest: Effort normal and breath sounds normal. No respiratory distress.   Abdominal: Soft. Bowel sounds are normal. There is no tenderness.   Musculoskeletal: She exhibits no edema or tenderness.   Lymphadenopathy:        Head (right side): No submandibular adenopathy present.        Head (left side): No submandibular adenopathy present.     She has no cervical adenopathy.     She has no axillary adenopathy.        Right: No inguinal adenopathy  present.        Left: No inguinal adenopathy present.   Neurological: She is alert and oriented to person, place, and time. She has normal strength.   Skin: Skin is intact. No lesion noted.   Psychiatric: She has a normal mood and affect. Her speech is normal and behavior is normal.     Assessment:     1. Physical exam    2. Chronic insomnia    3. Displacement of cervical intervertebral disc without myelopathy    4. Fatigue, unspecified type    5. Fibromyalgia    6. Multiple sclerosis    7. Neuropathic pain    8. Vitamin D insufficiency    9. Transient disorder of initiating or maintaining wakefulness    10. Wellness examination      Plan:   Krystal was seen today for annual exam.    Diagnoses and all orders for this visit:    Physical exam: updated and reviewed    Chronic insomnia: meds were changed to sonata    Displacement of cervical intervertebral disc without myelopathy: chronic pain    Fatigue, unspecified type: MS    Fibromyalgia: on treatment and stable    Multiple sclerosis: Sees Dr. Shanks    Neuropathic pain: patient states medication is helping    Vitamin D insufficiency: will check vit D level    Transient disorder of initiating or maintaining wakefulness on Adderall      Follow-up in about 1 year (around 3/5/2019) for Annual exam.     Medication List          Accurate as of 3/5/18 11:32 AM. If you have any questions, ask your nurse or doctor.               CHANGE how you take these medications    diclofenac sodium 1 % Gel  Commonly known as:  VOLTAREN  Apply 2 g topically 4 (four) times daily.  What changed:  · when to take this  · reasons to take this        CONTINUE taking these medications    ALPRAZolam 0.5 MG tablet  Commonly known as:  XANAX  Take 1 tablet (0.5 mg total) by mouth 2 (two) times daily as needed for Anxiety.     buPROPion 300 MG 24 hr tablet  Commonly known as:  WELLBUTRIN XL  Take 1 tablet (300 mg total) by mouth once daily. Take with food.     CO Q-10 400 mg Cap  Generic drug:   coenzyme Q10     * dextroamphetamine-amphetamine 20 mg tablet  Commonly known as:  ADDERALL  Take 1 tablet by mouth 2 (two) times daily.     * dextroamphetamine-amphetamine 20 mg tablet  Commonly known as:  ADDERALL  Take 1 tablet by mouth 2 (two) times daily.     DULoxetine 60 MG capsule  Commonly known as:  CYMBALTA  Take 2 capsules (120 mg total) by mouth once daily. 2 Capsule, Delayed Release(E.C.) Oral Every day     gabapentin 600 MG tablet  Commonly known as:  NEURONTIN  TAKE 2 TABLETS EVERY MORNING, 1 TABLET AT NOON AND 2 TABLETS AT BEDTIME     glatiramer 40 mg/mL Syrg injection  Commonly known as:  COPAXONE  INJECT 40MG (1 SYRINGE) SUBCUTANEOUSLY THREE TIMES A WEEK     latanoprost 0.005 % ophthalmic solution  Place 1 drop into both eyes every evening.     meloxicam 15 MG tablet  Commonly known as:  MOBIC  TAKE 1 TABLET (15 MG TOTAL) BY MOUTH ONCE DAILY.     multivitamin per tablet  Commonly known as:  THERAGRAN     PREVIDENT 5000 SENSITIVE 1.1-5 % Pste  Generic drug:  sodium fluoride-pot nitrate     rosuvastatin 10 MG tablet  Commonly known as:  CRESTOR  TAKE 1 TABLET (10 MG TOTAL) BY MOUTH EVERY EVENING.     tiZANidine 2 MG tablet  Commonly known as:  ZANAFLEX  TAKE 1/2 TABLET BY MOUTH TWICE A DAY THEN 2 TABS AT BEDTIME AS NEEDED     traMADol 50 mg tablet  Commonly known as:  ULTRAM  TAKE 1 TO 2 TABLETS BY MOUTH EVERY 6 HOURS AS NEEDED FOR PAIN     VESICARE 5 MG tablet  Generic drug:  solifenacin  TAKE 2 TABLETS (10 MG TOTAL) BY MOUTH ONCE DAILY.     VITAMIN D3 5,000 unit Tab  Generic drug:  cholecalciferol (vitamin D3)     VITAMINS B COMPLEX tablet  Generic drug:  b complex vitamins     XOLEGEL 2 % Gel  Generic drug:  ketoconazole     zaleplon 10 MG capsule  Commonly known as:  SONATA  Take 1 capsule (10 mg total) by mouth every evening.        * This list has 2 medication(s) that are the same as other medications prescribed for you. Read the directions carefully, and ask your doctor or other care provider  to review them with you.            STOP taking these medications    coQ10 (ubiquinol) 200 mg Cap  Stopped by:  Radhika Carrero MD     lovastatin 40 MG tablet  Commonly known as:  MEVACOR  Stopped by:  Radhika Carrero MD

## 2018-03-13 ENCOUNTER — CLINICAL SUPPORT (OUTPATIENT)
Dept: OPHTHALMOLOGY | Facility: CLINIC | Age: 58
End: 2018-03-13
Attending: OPTOMETRIST
Payer: COMMERCIAL

## 2018-03-13 ENCOUNTER — OFFICE VISIT (OUTPATIENT)
Dept: OPTOMETRY | Facility: CLINIC | Age: 58
End: 2018-03-13
Attending: OPTOMETRIST
Payer: COMMERCIAL

## 2018-03-13 DIAGNOSIS — H40.053 OCULAR HYPERTENSION, BILATERAL: ICD-10-CM

## 2018-03-13 DIAGNOSIS — Z98.890 HISTORY OF LASER REFRACTIVE SURGERY: ICD-10-CM

## 2018-03-13 DIAGNOSIS — G35 MULTIPLE SCLEROSIS: ICD-10-CM

## 2018-03-13 DIAGNOSIS — H21.562 AFFERENT PUPILLARY DEFECT OF LEFT EYE: ICD-10-CM

## 2018-03-13 DIAGNOSIS — H40.053 OCULAR HYPERTENSION, BILATERAL: Primary | ICD-10-CM

## 2018-03-13 PROCEDURE — 92133 CPTRZD OPH DX IMG PST SGM ON: CPT | Mod: S$GLB,,, | Performed by: OPTOMETRIST

## 2018-03-13 PROCEDURE — 99999 PR PBB SHADOW E&M-EST. PATIENT-LVL II: CPT | Mod: PBBFAC,,, | Performed by: OPTOMETRIST

## 2018-03-13 PROCEDURE — 92012 INTRM OPH EXAM EST PATIENT: CPT | Mod: S$GLB,,, | Performed by: OPTOMETRIST

## 2018-03-13 PROCEDURE — 92083 EXTENDED VISUAL FIELD XM: CPT | Mod: S$GLB,,, | Performed by: OPTOMETRIST

## 2018-03-13 NOTE — PROGRESS NOTES
HPI     Concerns About Ocular Health    Additional comments: f/u            Comments   Patient in for progress check.  Patient is in for a HVF/ OCT f/u   Being followed for (diagnosis):   Ocular Hypertension    Date last seen:  02/01/2018    Doctor last seen:  Dr. Murray     Prescribed eye medications(s) using:  Latanoprost 1 x a day OU     OTC eye medication(s) using:      Signs/symptoms of condition resolved/better/stable/worse?:  Stable     Allergies/Medications reviewed today?  Yes              Last edited by Tim Vealzquez on 3/13/2018  3:53 PM. (History)            Assessment /Plan     For exam results, see Encounter Report.    1. Ocular hypertension, bilateral     2. Afferent pupillary defect of left eye     3. History of laser refractive surgery     4. Multiple sclerosis                    History of multiple sclerosis.  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  Prior diagnosis of bilaeral ocular hypertension.  HVF test (24-2 KARO standard) and OCT RNFL thickness analysis done today.  No evidence of overtly glaucomatous visual field defect in either eye.  RNFL thickess of right eye within normal range 360°.  RNFL thickness of left eye bordeline superotemporally, and within normal range otherwise.   Intraocular pressure within normal range, although high-normal in each eye (17 mm Hg in the right eye, and 18 mm Hg in the left eye)  Reviewed test results and discussed with Ms. Eleser.  Continue drops for IOP control in both eyes:  Latanoprost 0.005%:  One drop into both eyes every evening.   Return for recheck February, 2019.

## 2018-03-13 NOTE — PATIENT INSTRUCTIONS
History of multiple sclerosis.  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  Prior diagnosis of bilaeral ocular hypertension.  HVF test (24-2 KARO standard) and OCT RNFL thickness analysis done today.  No evidence of overtly glaucomatous visual field defect in either eye.  RNFL thickess of right eye within normal range 360°.  RNFL thickness of left eye bordeline superotemporally, and within normal range otherwise.   Intraocular pressure within normal range, although high-normal in each eye (17 mm Hg in the right eye, and 18 mm Hg in the left eye)  Reviewed test results and discussed with Ms. Eleser.  Continue drops for IOP control in both eyes:  Latanoprost 0.005%:  One drop into both eyes every evening.   Return for recheck February, 2019.

## 2018-03-19 DIAGNOSIS — F33.1 MAJOR DEPRESSIVE DISORDER, RECURRENT EPISODE, MODERATE: ICD-10-CM

## 2018-03-19 DIAGNOSIS — F41.1 GENERALIZED ANXIETY DISORDER: ICD-10-CM

## 2018-03-19 RX ORDER — DULOXETIN HYDROCHLORIDE 60 MG/1
CAPSULE, DELAYED RELEASE ORAL
Qty: 180 CAPSULE | Refills: 1 | Status: CANCELLED | OUTPATIENT
Start: 2018-03-19

## 2018-03-21 DIAGNOSIS — F41.1 GENERALIZED ANXIETY DISORDER: ICD-10-CM

## 2018-03-21 DIAGNOSIS — F33.1 MAJOR DEPRESSIVE DISORDER, RECURRENT EPISODE, MODERATE: ICD-10-CM

## 2018-03-21 RX ORDER — GABAPENTIN 600 MG/1
TABLET ORAL
Qty: 450 TABLET | Refills: 1 | Status: SHIPPED | OUTPATIENT
Start: 2018-03-21 | End: 2018-09-30 | Stop reason: SDUPTHER

## 2018-03-22 RX ORDER — DULOXETIN HYDROCHLORIDE 60 MG/1
120 CAPSULE, DELAYED RELEASE ORAL DAILY
Qty: 180 CAPSULE | Refills: 0 | Status: SHIPPED | OUTPATIENT
Start: 2018-03-22 | End: 2018-05-07 | Stop reason: SDUPTHER

## 2018-04-16 RX ORDER — TRAMADOL HYDROCHLORIDE 50 MG/1
TABLET ORAL
Qty: 240 TABLET | Refills: 0 | Status: SHIPPED | OUTPATIENT
Start: 2018-04-16 | End: 2018-05-22 | Stop reason: SDUPTHER

## 2018-05-02 ENCOUNTER — PATIENT MESSAGE (OUTPATIENT)
Dept: INTERNAL MEDICINE | Facility: CLINIC | Age: 58
End: 2018-05-02

## 2018-05-07 ENCOUNTER — OFFICE VISIT (OUTPATIENT)
Dept: PSYCHIATRY | Facility: CLINIC | Age: 58
End: 2018-05-07
Payer: COMMERCIAL

## 2018-05-07 VITALS
HEIGHT: 60 IN | DIASTOLIC BLOOD PRESSURE: 81 MMHG | BODY MASS INDEX: 31.2 KG/M2 | WEIGHT: 158.94 LBS | HEART RATE: 95 BPM | SYSTOLIC BLOOD PRESSURE: 154 MMHG

## 2018-05-07 DIAGNOSIS — F34.1 DYSTHYMIC DISORDER: ICD-10-CM

## 2018-05-07 DIAGNOSIS — F51.04 CHRONIC INSOMNIA: ICD-10-CM

## 2018-05-07 DIAGNOSIS — G35 MULTIPLE SCLEROSIS: ICD-10-CM

## 2018-05-07 DIAGNOSIS — F33.0 MDD (MAJOR DEPRESSIVE DISORDER), RECURRENT EPISODE, MILD: Primary | ICD-10-CM

## 2018-05-07 DIAGNOSIS — F41.1 GAD (GENERALIZED ANXIETY DISORDER): ICD-10-CM

## 2018-05-07 DIAGNOSIS — F41.1 GENERALIZED ANXIETY DISORDER: ICD-10-CM

## 2018-05-07 DIAGNOSIS — F33.1 MAJOR DEPRESSIVE DISORDER, RECURRENT EPISODE, MODERATE: ICD-10-CM

## 2018-05-07 DIAGNOSIS — R53.82 CHRONIC FATIGUE: ICD-10-CM

## 2018-05-07 DIAGNOSIS — Z63.4 BEREAVEMENT: ICD-10-CM

## 2018-05-07 PROCEDURE — 99213 OFFICE O/P EST LOW 20 MIN: CPT | Mod: S$GLB,,, | Performed by: PSYCHIATRY & NEUROLOGY

## 2018-05-07 PROCEDURE — 3008F BODY MASS INDEX DOCD: CPT | Mod: CPTII,S$GLB,, | Performed by: PSYCHIATRY & NEUROLOGY

## 2018-05-07 PROCEDURE — 99999 PR PBB SHADOW E&M-EST. PATIENT-LVL III: CPT | Mod: PBBFAC,,, | Performed by: PSYCHIATRY & NEUROLOGY

## 2018-05-07 PROCEDURE — 90833 PSYTX W PT W E/M 30 MIN: CPT | Mod: S$GLB,,, | Performed by: PSYCHIATRY & NEUROLOGY

## 2018-05-07 RX ORDER — MELOXICAM 15 MG/1
1 TABLET ORAL DAILY
COMMUNITY
Start: 2018-04-25 | End: 2018-11-02

## 2018-05-07 RX ORDER — DULOXETIN HYDROCHLORIDE 60 MG/1
120 CAPSULE, DELAYED RELEASE ORAL DAILY
Qty: 180 CAPSULE | Refills: 3 | Status: SHIPPED | OUTPATIENT
Start: 2018-05-07 | End: 2019-01-04 | Stop reason: SDUPTHER

## 2018-05-07 RX ORDER — DEXTROAMPHETAMINE SACCHARATE, AMPHETAMINE ASPARTATE, DEXTROAMPHETAMINE SULFATE AND AMPHETAMINE SULFATE 5; 5; 5; 5 MG/1; MG/1; MG/1; MG/1
1 TABLET ORAL 2 TIMES DAILY
Qty: 60 TABLET | Refills: 0 | Status: SHIPPED | OUTPATIENT
Start: 2018-07-05 | End: 2019-02-21 | Stop reason: SDUPTHER

## 2018-05-07 RX ORDER — BUPROPION HYDROCHLORIDE 300 MG/1
300 TABLET ORAL DAILY
Qty: 90 TABLET | Refills: 3 | Status: SHIPPED | OUTPATIENT
Start: 2018-05-07 | End: 2019-02-21 | Stop reason: SDUPTHER

## 2018-05-07 RX ORDER — DEXTROAMPHETAMINE SACCHARATE, AMPHETAMINE ASPARTATE, DEXTROAMPHETAMINE SULFATE AND AMPHETAMINE SULFATE 5; 5; 5; 5 MG/1; MG/1; MG/1; MG/1
1 TABLET ORAL 2 TIMES DAILY
Qty: 60 TABLET | Refills: 0 | Status: SHIPPED | OUTPATIENT
Start: 2018-05-07 | End: 2019-02-21 | Stop reason: SDUPTHER

## 2018-05-07 RX ORDER — ALPRAZOLAM 0.5 MG/1
0.5 TABLET ORAL 2 TIMES DAILY PRN
Qty: 180 TABLET | Refills: 1 | Status: SHIPPED | OUTPATIENT
Start: 2018-05-07 | End: 2018-11-16 | Stop reason: SDUPTHER

## 2018-05-07 RX ORDER — DEXTROAMPHETAMINE SACCHARATE, AMPHETAMINE ASPARTATE, DEXTROAMPHETAMINE SULFATE AND AMPHETAMINE SULFATE 5; 5; 5; 5 MG/1; MG/1; MG/1; MG/1
1 TABLET ORAL 2 TIMES DAILY
Qty: 60 TABLET | Refills: 0 | Status: SHIPPED | OUTPATIENT
Start: 2018-06-06 | End: 2019-02-21 | Stop reason: SDUPTHER

## 2018-05-07 RX ORDER — ZALEPLON 10 MG/1
10 CAPSULE ORAL NIGHTLY
Qty: 30 CAPSULE | Refills: 5 | Status: SHIPPED | OUTPATIENT
Start: 2018-05-07 | End: 2019-06-20 | Stop reason: SINTOL

## 2018-05-07 SDOH — SOCIAL DETERMINANTS OF HEALTH (SDOH): DISSAPEARANCE AND DEATH OF FAMILY MEMBER: Z63.4

## 2018-05-07 NOTE — PROGRESS NOTES
"Outpatient Psychiatry Follow-Up Visit (MD/NP)  2018    Session Length:  30 minutes (E&M plus psychotherapy)     Clinical Status of Patient: Outpatient (Ambulatory)    Chief Complaint: Karen D Eleser is a 57 y.o. female who presents today for follow-up of chronic depression and anxiety.    Met with patient.     Interval History and Content of Current Session:  Interim Events/Subjective Report/Content of Current Session:  First f/u appt with me since 2018.  Pt had seen Dr. Tash Clark in the past; last appointment was on 2015 (this note was reviewed).    Plan from last session: "Try Sonata (zaleplon) 10 mg one capsule at night to help fall back asleep.  Pt told she can take this med as long as she can sleep for at least 4 hours after dosing.   Continue Cymbalta 120 mg daily   Continue Wellbutrin  mg total daily.  Pt has denied Hx of seizures.     Continue Xanax 0.5 mg BID prn anxiety  Pt can take OTC Benadryl or melatonin for sleep.   Also, continue Adderall immediate release 20 mg for treatment of fatigue/focus/concentration and treatment resistant depression.  I have recommended she take no > 60 mg max per day, preferably less.  Three Rx for Adderall 20 mg, each for a 30 day supply with no refills & with "Do not fill until" dates posted accordingly, were given to patient.   Encourage individual psychotherapy for support. Patient had been meeting with LCSW in Neurology clinic; she can continue this (if insurance allows) or consider f/u with Dr. Nery Toledo or other therapist in our clinic."      She states she has been "OK".    Her sister and father had  some years ago; their birthdays were in April.    Also, she had received news in April that her ex-fiance had , so she has had some grief from this.  She states she had not spoken to him in at least 2 years.  She did attend his [place] service.   Since then, she has had a hard time motivating herself.  She has just felt "blah".  " "She has not been as active; she has been more tired lately.    She is able to fall asleep; she can have a hard time falling back to sleep.  She states this has been going on just in past 1 - 2 months.  She did take the Sonata and it worked fairly well when she needed to get back to sleep.    She also is making herself go to bed so she can get plenty of rest.  "Mornings have been so hard for me to get up and get going".      She has NOT been taking the Adderall everyday.  "I don't feel like living a drugged existence".  She did take one this AM to be awake and alert for this appt today (11:30 AM).  She states she normally sleeps until around 11 am - noon.     She has tried the Sonata -- it does help her fall back asleep.    Previously, she had to stop taking the Ambien because it was causing visual hallucinations and illusions at night (i.e., TV cord looks like a snake), as well as sleepwalking and sleepeating behaviors.     She has also used 50 mg Benadryl -- it seems to help OK with sleep.      I reviewed her MRI from 1/10/18 (she had her annual study due to MS).  It appears unremarkable regarding any changes in the past year.    She has numbness and tingling mainly in her L arm; it can also occur in her R arm as well.      She has stated she enjoys reading and watching certain TV shows, particularly historical based fiction.    She has a harder time reading since she was Dx with MS, as it has affected her ability to concentrate.    She states she has joined on line groups for various interests.     She enjoys doing things outdoors in cool weather.  She likes to travel.       Current SIGECAPS:    Sleep -- has problems falling asleep; often must take sleep med to be able to fall asleep or go back to sleep.   Interests -- low ("I don't have anybody to do anything with" -- very few friends; had done a lot of things with her sister)    Guilt -- for "enabling my sister for so many years", not showing tough love.  " "Similar feelings with her mom (had to say "no" to her; feelings regarding circumstances surrounding her death).    Energy -- low; always fatigued   Concentration -- poor due to MS; has a hard time reading (Adderall helps)   Appetite -- "OK'   Psychomotor --  Somewhat decreased   Suicidal ideation -- denies   She does not feel optimistic about the future.     Her main physical complaint is she has chronic painful neuropathy in her feet, legs due to lumbar disc herniation.    She has stated she has had problems with verbal expression of words that she wants to say (she is on gabapentin, which could have an effect on cognition).      Her father had endocarditis when he was younger.  He then had to have a pacemaker, then dual pacemaker/defibrillator.  He also had 3 sisters (her aunts) who all had heart disease (I did not asked if they smoked).  He had 3 older brothers who have been healthy.  She denies heart disease on her mother's side.    Her sister  from a massive MI at age 57 yo.  However, she abused pills and OTC meds and had gone to rehab at [facility] just months prior to her death.  She notes she and her sister were very close; they even lived together for a while.    Pt has denied ever having problems with her heart.  She stated she never had a stress test, but she has had "plenty of" EKG's.     She had been treated by Dr. Royce Pina for MS until his death in .  She has also been treated by other neurologists.  Dr. Maia Minaya is her neurologist currently.      Past psych meds: celexa, lexapro, and effexor in the past.    From previous sessions:  Regarding her mom and sister:  Mother passed: 2015. Sister passed: 2014. Sister had problems with drugs and alcohol, was a nurse and lost her nursing license.  She  from a massive MI while in the correction house after substance rehab.  She was close to them both.  She had enjoyed going to the movies, going out to eat, travel, etc.  However, she " "had always done these things with her sister, who  in 2014.  She has no one in her life that she is close to compared to her sister in the past.  She has a brother who is 6 years younger -- "he can be very abrasive, he talks real loud".  "He also has his life, his children".  She also notes he does not understand or appreciate her mental illness.   --Regarding past intimate relationships: She has had just one very serious relationship that lasted for 10 years.  She states the relationship ended because he lied to her & told her that he worked and stayed at his mother's house, caring for her.  However, he did not work, lived off his mother's SS, then was kicked out of the mom's trailer by his siblings after she , so he was homeless for a while.  She thinks he may be living with his ex-wife now.  She states this happened about 5 years ago.    Since then, she had met 2 guys on line ([employer]) -- went out with each of them a few times, but nothing ever came of this.    She notes her mother was very controlling.  Pt also was shy growing up.  Pt denies ever being sexually molested.  She denies ever having problems with alcohol or drugs.  She is out on disability from work ([employer]) as a .    She is still getting a check through her work disability co.     PSYCHOTHERAPY ADD-ON [id number]   30 (16-37*) minutes   · Target symptoms: depression, anxiety, fatigue  · Why chosen therapy is appropriate versus another modality: relevant to diagnosis, patient responds to this modality  · Outcome monitoring methods: self-report  · Therapeutic intervention type: supportive psychotherapy  · Topics discussed/themes: SEE ABOVE-- illness/death of loved ones, stress related to medical comorbidities, life stage transitional issues, social isolation, self care/nurturing.  · The patient's response to the intervention is accepting.   · The patient's progress toward treatment goals is fair.  · Duration of " intervention: 20 minutes      Review of Systems   · PSYCHIATRIC: Pertinant items are noted in the narrative.  · CONSTITUTIONAL: Some recent wt gain.  Chronic fatigue.     · MUSCULOSKELETAL: + chronic pain in her feet/legs  · NEUROLOGIC: No weakness, sensory changes, seizures, confusion, memory loss, tremor or other abnormal movements.  · ENDOCRINE: No polydipsia or polyuria.  · INTEGUMENTARY: No rashes or lacerations.  · EYES: No exophthalmos, jaundice or blindness.  · ENT: No dizziness, tinnitus or hearing loss.  · RESPIRATORY: No shortness of breath.  · CARDIOVASCULAR: No tachycardia or chest pain.  · GASTROINTESTINAL: No nausea, vomiting, pain, constipation or diarrhea.  · GENITOURINARY: No frequency, dysuria.        Past Medical, Family and Social History: The patient's past medical, family and social history have been reviewed and updated as appropriate within the electronic medical record -- see encounter notes.    Current Medications:    Current Outpatient Prescriptions:     ALPRAZolam (XANAX) 0.5 MG tablet, Take 1 tablet (0.5 mg total) by mouth 2 (two) times daily as needed for Anxiety., Disp: 180 tablet, Rfl: 1    b complex vitamins (VITAMIN B COMPLEX) tablet, Take by mouth. 1 Tablet Oral Every day, Disp: , Rfl:     buPROPion (WELLBUTRIN XL) 300 MG 24 hr tablet, Take 1 tablet (300 mg total) by mouth once daily. Take with food., Disp: 90 tablet, Rfl: 1    cholecalciferol, vitamin D3, (VITAMIN D3) 5,000 unit Tab, Take 5,000 Units by mouth once daily. , Disp: , Rfl:     coenzyme Q10 (CO Q-10) 400 mg Cap, Take by mouth., Disp: , Rfl:     dextroamphetamine-amphetamine (ADDERALL) 20 mg tablet, Take 1 tablet by mouth 2 (two) times daily., Disp: 60 tablet, Rfl: 0    dextroamphetamine-amphetamine (ADDERALL) 20 mg tablet, Take 1 tablet by mouth 2 (two) times daily., Disp: 60 tablet, Rfl: 0    diclofenac sodium (VOLTAREN) 1 % Gel, Apply 2 g topically 4 (four) times daily. (Patient taking differently: Apply 2 g  topically continuous prn. ), Disp: 5 Tube, Rfl: 3    DULoxetine (CYMBALTA) 60 MG capsule, Take 2 capsules (120 mg total) by mouth once daily., Disp: 180 capsule, Rfl: 0    gabapentin (NEURONTIN) 600 MG tablet, TAKE 2 TABLETS EVERY MORNING, 1 TABLET AT NOON AND 2 TABLETS AT BEDTIME, Disp: 450 tablet, Rfl: 1    glatiramer (COPAXONE) 40 mg/mL Syrg injection, INJECT 40MG (1 SYRINGE) SUBCUTANEOUSLY THREE TIMES A WEEK, Disp: 36 Syringe, Rfl: 1    ketoconazole (XOLEGEL) 2 % Gel, Apply topically., Disp: , Rfl:     latanoprost 0.005 % ophthalmic solution, Place 1 drop into both eyes every evening., Disp: 2.5 mL, Rfl: 5    meloxicam (MOBIC) 15 MG tablet, TAKE 1 TABLET (15 MG TOTAL) BY MOUTH ONCE DAILY., Disp: 90 tablet, Rfl: 2    multivitamin (THERAGRAN) per tablet, Take by mouth. 1 Tablet Oral Every day, Disp: , Rfl:     PREVIDENT 5000 SENSITIVE 1.1-5 % Pste, Apply topically once daily. , Disp: , Rfl:     rosuvastatin (CRESTOR) 10 MG tablet, TAKE 1 TABLET (10 MG TOTAL) BY MOUTH EVERY EVENING., Disp: 30 tablet, Rfl: 1    tiZANidine (ZANAFLEX) 2 MG tablet, TAKE 1/2 TABLET BY MOUTH TWICE A DAY THEN 2 TABS AT BEDTIME AS NEEDED, Disp: 270 tablet, Rfl: 1    traMADol (ULTRAM) 50 mg tablet, TAKE 1 TO 2 TABLETS BY MOUTH EVERY 6 HOURS AS NEEDED FOR PAIN., Disp: 240 tablet, Rfl: 0    VESICARE 5 mg tablet, TAKE 2 TABLETS (10 MG TOTAL) BY MOUTH ONCE DAILY., Disp: 180 tablet, Rfl: 1    zaleplon (SONATA) 10 MG capsule, Take 1 capsule (10 mg total) by mouth every evening., Disp: 30 capsule, Rfl: 3     Compliance: yes    Side effects: Ambien -- visual hallucinations and illusions, sleepwalking/sleepeating.     Risk Parameters:  Patient reports no suicidal ideation  Patient reports no homicidal ideation  Patient reports no self-injurious behavior  Patient reports no violent behavior    Exam (detailed: at least 9 elements; comprehensive: all 15 elements)    Constitutional  Vitals:  Most recent vital signs, dated less than 90 days  "prior to this appointment, were reviewed:     Vitals - 1 value per visit 3/5/2018 3/13/2018 5/7/2018   SYSTOLIC 138  154   DIASTOLIC 80  81   PULSE 79  95   RESPIRATIONS      SPO2 98     Weight (lb) 161.38  158.95   Weight (kg) 73.2  72.1   HEIGHT 5' 0"  5' 0"   BODY MASS INDEX 31.52  31.04   VISIT REPORT      Pain Score  0 0        Vitals - 1 value per visit 12/28/2017 2/1/2018   SYSTOLIC 120 145   DIASTOLIC 75 78   PULSE 91 88   Weight (lb) 152 162.2   Weight (kg) 68.947 73.573   HEIGHT  5' 0"   BODY MASS INDEX 29.69 31.68   VISIT REPORT     Pain Score  6        Vitals - 1 value per visit 7/24/2017 8/10/2017 11/2/2017   SYSTOLIC 161 138 144   DIASTOLIC 94 84 73   PULSE 103 95 104   Weight (lb) 152 152 150   Weight (kg) 68.947 68.947 68.04   HEIGHT 5' 0" 5' 0" 5' 0"   BODY MASS INDEX 29.69 29.69 29.29   VISIT REPORT      Pain Score  6         Vitals - 1 value per visit 9/28/2016 10/20/2016 12/7/2016   SYSTOLIC 124 172 145   DIASTOLIC 86 90 78   PULSE 60 99 99   Weight (lb) 163.36 164 160.7   HEIGHT 5' 0" 5' 0" 5' 0"   BODY MASS INDEX 31.9 32.03 31.38   VISIT REPORT      Pain Score  5  5        General:  unremarkable, age appropriate, well dressed, neatly groomed. Not wearing make-up today (normal for her with Confucianism/charismatic elin)     Musculoskeletal  Muscle Strength/Tone:  no tremor, no tic    Gait & Station:  non-ataxic      Psychiatric  Speech:  normal tone, normal rate, normal pitch, normal volume, spontaneous    Mood & Affect:  "OK"  congruent and appropriate to situation/content. Appropriately tearful when discussing aspects of grief.    Thought Process:  normal and logical, goal-directed    Associations:  intact    Thought Content:  normal, no suicidality, no homicidality, delusions, or paranoia    Insight & Judgement:  good, intact  adequate to circumstances, age appropriate, good    Orientation:  grossly intact, person, place, time/date, situation    Memory:  intact for content of interview, " grossly intact    Language:  grossly intact    Attention Span & Concentration:  able to focus    Fund of Knowledge:  intact and appropriate to age and level of education, familiar with aspects of current personal life      Prior EKG:  Test Reason : Z79.899  Vent. Rate : 077 BPM     Atrial Rate : 077 BPM     P-R Int : 136 ms          QRS Dur : 090 ms      QT Int : 406 ms       P-R-T Axes : 033 000 073 degrees     QTc Int : 459 ms    Normal sinus rhythm  Poor R wave progression  Abnormal ECG  When compared with ECG of 21-JUL-2008 08:45,  Nonspecific T wave abnormality no longer evident in Inferior leads  QT has lengthened  Confirmed by Marty VEE, Yaneli ([id number]) on 8/11/2017 4:36:19 PM      Assessment and Diagnosis    Status/Progress: Based on the examination today, the patient's problem(s) is/are adequately, but not ideally controlled. New problems have not been presented today. Comorbidities (chronic neuropathic pain) are complicating management of the primary condition. There are no active rule-out diagnoses for this patient at this time.    General Impression:   Major Depressive Disorder, Recurrent: Mild  Dysthymic Disorder   Generalized Anxiety Disorder   Bereavement  Chronic Insomnia   Chronic fatigue  Also, multiple sclerosis, chronic pain, CHELSEA    Intervention/Counseling/Treatment Plan    Continue all current meds:  Continue Sonata (zaleplon) 10 mg one capsule at night to help fall back asleep.  Pt was told she can take this med as long as she can sleep for at least 4 hours after dosing.   Continue Cymbalta 120 mg daily  Continue Wellbutrin  mg total daily.  Pt has denied Hx of seizures.     Continue Xanax 0.5 mg BID prn anxiety  Pt can take OTC Benadryl or melatonin for sleep.   Also, continue Adderall immediate release 20 mg for treatment of fatigue/focus/concentration and treatment resistant depression.  I have recommended she take no > 60 mg max per day, preferably less.  Three Rx for Adderall 20 mg,  "each for a 30 day supply with no refills & with "Do not fill until" dates posted accordingly, were given to patient.   Encourage individual psychotherapy for support. Patient had been meeting with LCSW in Neurology clinic; she can continue this (if insurance allows) or consider f/u with Dr. Nery Toledo or other therapist in our clinic.        Additional Notes:  N/A      Return to Clinic: 3 months, or sooner prn.             "

## 2018-05-13 PROBLEM — Z63.4 BEREAVEMENT: Status: ACTIVE | Noted: 2018-05-13

## 2018-05-21 RX ORDER — TRAMADOL HYDROCHLORIDE 50 MG/1
TABLET ORAL
Qty: 240 TABLET | Refills: 0 | OUTPATIENT
Start: 2018-05-21

## 2018-05-22 RX ORDER — TRAMADOL HYDROCHLORIDE 50 MG/1
TABLET ORAL
Qty: 240 TABLET | Refills: 0 | Status: SHIPPED | OUTPATIENT
Start: 2018-05-22 | End: 2018-05-25 | Stop reason: SDUPTHER

## 2018-05-25 ENCOUNTER — PATIENT MESSAGE (OUTPATIENT)
Dept: RHEUMATOLOGY | Facility: CLINIC | Age: 58
End: 2018-05-25

## 2018-05-25 RX ORDER — TRAMADOL HYDROCHLORIDE 50 MG/1
TABLET ORAL
Qty: 240 TABLET | Refills: 0 | Status: SHIPPED | OUTPATIENT
Start: 2018-05-25 | End: 2018-07-28 | Stop reason: SDUPTHER

## 2018-05-31 ENCOUNTER — TELEPHONE (OUTPATIENT)
Dept: OPTOMETRY | Facility: CLINIC | Age: 58
End: 2018-05-31

## 2018-06-17 DIAGNOSIS — G35 MULTIPLE SCLEROSIS: ICD-10-CM

## 2018-06-17 DIAGNOSIS — R25.2 SPASTICITY: ICD-10-CM

## 2018-06-17 RX ORDER — ROSUVASTATIN CALCIUM 10 MG/1
10 TABLET, COATED ORAL NIGHTLY
Qty: 90 TABLET | Refills: 1 | Status: SHIPPED | OUTPATIENT
Start: 2018-06-17 | End: 2019-02-22 | Stop reason: SDUPTHER

## 2018-06-18 RX ORDER — TIZANIDINE 2 MG/1
TABLET ORAL
Qty: 270 TABLET | Refills: 1 | Status: SHIPPED | OUTPATIENT
Start: 2018-06-18 | End: 2019-02-22 | Stop reason: SDUPTHER

## 2018-07-30 ENCOUNTER — PATIENT MESSAGE (OUTPATIENT)
Dept: RHEUMATOLOGY | Facility: CLINIC | Age: 58
End: 2018-07-30

## 2018-07-30 RX ORDER — TRAMADOL HYDROCHLORIDE 50 MG/1
TABLET ORAL
Qty: 240 TABLET | Refills: 0 | Status: SHIPPED | OUTPATIENT
Start: 2018-07-30 | End: 2018-08-28 | Stop reason: SDUPTHER

## 2018-08-13 DIAGNOSIS — G35 MS (MULTIPLE SCLEROSIS): ICD-10-CM

## 2018-08-14 RX ORDER — SOLIFENACIN SUCCINATE 5 MG/1
TABLET, FILM COATED ORAL
Qty: 180 TABLET | Refills: 1 | Status: SHIPPED | OUTPATIENT
Start: 2018-08-14 | End: 2019-10-21 | Stop reason: SDUPTHER

## 2018-08-28 RX ORDER — TRAMADOL HYDROCHLORIDE 50 MG/1
TABLET ORAL
Qty: 240 TABLET | Refills: 0 | Status: SHIPPED | OUTPATIENT
Start: 2018-08-28 | End: 2018-11-02 | Stop reason: SDUPTHER

## 2018-09-30 RX ORDER — GABAPENTIN 600 MG/1
TABLET ORAL
Qty: 450 TABLET | Refills: 1 | Status: SHIPPED | OUTPATIENT
Start: 2018-09-30 | End: 2019-03-31 | Stop reason: SDUPTHER

## 2018-10-04 DIAGNOSIS — Z12.11 COLON CANCER SCREENING: ICD-10-CM

## 2018-10-04 DIAGNOSIS — Z11.59 NEED FOR HEPATITIS C SCREENING TEST: ICD-10-CM

## 2018-10-17 DIAGNOSIS — G35 MULTIPLE SCLEROSIS: ICD-10-CM

## 2018-10-17 RX ORDER — GLATIRAMER 40 MG/ML
INJECTION, SOLUTION SUBCUTANEOUS
Qty: 36 SYRINGE | Refills: 1 | Status: SHIPPED | OUTPATIENT
Start: 2018-10-17 | End: 2019-10-15 | Stop reason: SDUPTHER

## 2018-10-31 RX ORDER — TRAMADOL HYDROCHLORIDE 50 MG/1
TABLET ORAL
Qty: 240 TABLET | Refills: 0 | Status: CANCELLED | OUTPATIENT
Start: 2018-10-31

## 2018-11-02 RX ORDER — TRAMADOL HYDROCHLORIDE 50 MG/1
50-100 TABLET ORAL EVERY 6 HOURS PRN
Qty: 120 TABLET | Refills: 2 | Status: SHIPPED | OUTPATIENT
Start: 2018-11-02 | End: 2019-01-31 | Stop reason: SDUPTHER

## 2018-11-02 RX ORDER — MELOXICAM 15 MG/1
15 TABLET ORAL DAILY
Qty: 90 TABLET | Refills: 0 | Status: SHIPPED | OUTPATIENT
Start: 2018-11-02 | End: 2019-01-30 | Stop reason: SDUPTHER

## 2018-11-09 ENCOUNTER — PATIENT MESSAGE (OUTPATIENT)
Dept: PSYCHIATRY | Facility: CLINIC | Age: 58
End: 2018-11-09

## 2018-11-14 ENCOUNTER — TELEPHONE (OUTPATIENT)
Dept: OPTOMETRY | Facility: CLINIC | Age: 58
End: 2018-11-14

## 2018-11-16 DIAGNOSIS — F41.1 GENERALIZED ANXIETY DISORDER: ICD-10-CM

## 2018-11-16 RX ORDER — ALPRAZOLAM 0.5 MG/1
0.5 TABLET ORAL 2 TIMES DAILY
Qty: 14 TABLET | Refills: 0 | Status: SHIPPED | OUTPATIENT
Start: 2018-11-16 | End: 2019-01-04 | Stop reason: SDUPTHER

## 2018-11-16 NOTE — TELEPHONE ENCOUNTER
Spoke with patient.  She has MS, which explains simultaneous stimulant and Xanax.  She is aware of risks of sedation, habituation, falls.  Rx for 7 days sent to Pharmacy.  She will discuss further refills with Dr. Tyler.

## 2018-11-19 ENCOUNTER — PATIENT MESSAGE (OUTPATIENT)
Dept: PSYCHIATRY | Facility: CLINIC | Age: 58
End: 2018-11-19

## 2019-01-04 ENCOUNTER — PATIENT MESSAGE (OUTPATIENT)
Dept: PSYCHIATRY | Facility: CLINIC | Age: 59
End: 2019-01-04

## 2019-01-04 DIAGNOSIS — F33.1 MAJOR DEPRESSIVE DISORDER, RECURRENT EPISODE, MODERATE: ICD-10-CM

## 2019-01-04 DIAGNOSIS — F41.1 GENERALIZED ANXIETY DISORDER: ICD-10-CM

## 2019-01-04 RX ORDER — DULOXETIN HYDROCHLORIDE 60 MG/1
120 CAPSULE, DELAYED RELEASE ORAL DAILY
Qty: 180 CAPSULE | Refills: 0 | Status: SHIPPED | OUTPATIENT
Start: 2019-01-04 | End: 2019-02-21 | Stop reason: SDUPTHER

## 2019-01-04 RX ORDER — ALPRAZOLAM 0.5 MG/1
0.5 TABLET ORAL 2 TIMES DAILY
Qty: 30 TABLET | Refills: 1 | Status: SHIPPED | OUTPATIENT
Start: 2019-01-04 | End: 2019-02-21 | Stop reason: SDUPTHER

## 2019-01-29 DIAGNOSIS — R25.2 SPASTICITY: ICD-10-CM

## 2019-01-29 DIAGNOSIS — G35 MULTIPLE SCLEROSIS: ICD-10-CM

## 2019-01-29 RX ORDER — TIZANIDINE 2 MG/1
TABLET ORAL
Qty: 270 TABLET | Refills: 1 | OUTPATIENT
Start: 2019-01-29

## 2019-01-30 DIAGNOSIS — R25.2 SPASTICITY: ICD-10-CM

## 2019-01-30 DIAGNOSIS — G35 MULTIPLE SCLEROSIS: ICD-10-CM

## 2019-01-30 RX ORDER — MELOXICAM 15 MG/1
15 TABLET ORAL DAILY
Qty: 90 TABLET | Refills: 0 | Status: SHIPPED | OUTPATIENT
Start: 2019-01-30 | End: 2019-02-01 | Stop reason: SDUPTHER

## 2019-01-30 RX ORDER — TIZANIDINE 2 MG/1
TABLET ORAL
Qty: 270 TABLET | Refills: 1 | OUTPATIENT
Start: 2019-01-30

## 2019-01-31 ENCOUNTER — PATIENT MESSAGE (OUTPATIENT)
Dept: RHEUMATOLOGY | Facility: CLINIC | Age: 59
End: 2019-01-31

## 2019-01-31 ENCOUNTER — TELEPHONE (OUTPATIENT)
Dept: RHEUMATOLOGY | Facility: CLINIC | Age: 59
End: 2019-01-31

## 2019-01-31 RX ORDER — TRAMADOL HYDROCHLORIDE 50 MG/1
50-100 TABLET ORAL EVERY 6 HOURS PRN
Qty: 120 TABLET | Refills: 0 | Status: SHIPPED | OUTPATIENT
Start: 2019-01-31 | End: 2019-03-15 | Stop reason: SDUPTHER

## 2019-02-01 RX ORDER — MELOXICAM 15 MG/1
15 TABLET ORAL DAILY
Qty: 90 TABLET | Refills: 0 | Status: SHIPPED | OUTPATIENT
Start: 2019-02-01 | End: 2019-05-02 | Stop reason: SDUPTHER

## 2019-02-01 NOTE — TELEPHONE ENCOUNTER
Dr. Rios pt. Please schedule overdue f/u appt with any one in [facility] and also appt in Pain Clinic for chronic pain. Thank you.

## 2019-02-21 ENCOUNTER — OFFICE VISIT (OUTPATIENT)
Dept: PSYCHIATRY | Facility: CLINIC | Age: 59
End: 2019-02-21
Payer: COMMERCIAL

## 2019-02-21 VITALS
BODY MASS INDEX: 30.38 KG/M2 | SYSTOLIC BLOOD PRESSURE: 139 MMHG | HEIGHT: 60 IN | WEIGHT: 154.75 LBS | DIASTOLIC BLOOD PRESSURE: 80 MMHG | HEART RATE: 83 BPM

## 2019-02-21 DIAGNOSIS — G35 MULTIPLE SCLEROSIS: ICD-10-CM

## 2019-02-21 DIAGNOSIS — F41.1 GENERALIZED ANXIETY DISORDER: ICD-10-CM

## 2019-02-21 DIAGNOSIS — R53.82 CHRONIC FATIGUE: ICD-10-CM

## 2019-02-21 DIAGNOSIS — F51.04 CHRONIC INSOMNIA: ICD-10-CM

## 2019-02-21 DIAGNOSIS — F34.1 DYSTHYMIC DISORDER: ICD-10-CM

## 2019-02-21 DIAGNOSIS — F33.1 MAJOR DEPRESSIVE DISORDER, RECURRENT EPISODE, MODERATE: ICD-10-CM

## 2019-02-21 DIAGNOSIS — F33.0 MDD (MAJOR DEPRESSIVE DISORDER), RECURRENT EPISODE, MILD: Primary | ICD-10-CM

## 2019-02-21 DIAGNOSIS — F41.1 GAD (GENERALIZED ANXIETY DISORDER): ICD-10-CM

## 2019-02-21 PROCEDURE — 99999 PR PBB SHADOW E&M-EST. PATIENT-LVL III: ICD-10-PCS | Mod: PBBFAC,,, | Performed by: PSYCHIATRY & NEUROLOGY

## 2019-02-21 PROCEDURE — 90833 PR PSYCHOTHERAPY W/PATIENT W/E&M, 30 MIN (ADD ON): ICD-10-PCS | Mod: S$GLB,,, | Performed by: PSYCHIATRY & NEUROLOGY

## 2019-02-21 PROCEDURE — 99999 PR PBB SHADOW E&M-EST. PATIENT-LVL III: CPT | Mod: PBBFAC,,, | Performed by: PSYCHIATRY & NEUROLOGY

## 2019-02-21 PROCEDURE — 99213 OFFICE O/P EST LOW 20 MIN: CPT | Mod: S$GLB,,, | Performed by: PSYCHIATRY & NEUROLOGY

## 2019-02-21 PROCEDURE — 3008F PR BODY MASS INDEX (BMI) DOCUMENTED: ICD-10-PCS | Mod: CPTII,S$GLB,, | Performed by: PSYCHIATRY & NEUROLOGY

## 2019-02-21 PROCEDURE — 90833 PSYTX W PT W E/M 30 MIN: CPT | Mod: S$GLB,,, | Performed by: PSYCHIATRY & NEUROLOGY

## 2019-02-21 PROCEDURE — 99213 PR OFFICE/OUTPT VISIT, EST, LEVL III, 20-29 MIN: ICD-10-PCS | Mod: S$GLB,,, | Performed by: PSYCHIATRY & NEUROLOGY

## 2019-02-21 PROCEDURE — 3008F BODY MASS INDEX DOCD: CPT | Mod: CPTII,S$GLB,, | Performed by: PSYCHIATRY & NEUROLOGY

## 2019-02-21 RX ORDER — ALPRAZOLAM 0.5 MG/1
0.5 TABLET ORAL 2 TIMES DAILY
Qty: 60 TABLET | Refills: 5 | Status: SHIPPED | OUTPATIENT
Start: 2019-02-21 | End: 2019-06-20 | Stop reason: SDUPTHER

## 2019-02-21 RX ORDER — DULOXETIN HYDROCHLORIDE 60 MG/1
120 CAPSULE, DELAYED RELEASE ORAL DAILY
Qty: 60 CAPSULE | Refills: 12 | Status: SHIPPED | OUTPATIENT
Start: 2019-02-21 | End: 2019-10-16 | Stop reason: SDUPTHER

## 2019-02-21 RX ORDER — DEXTROAMPHETAMINE SACCHARATE, AMPHETAMINE ASPARTATE, DEXTROAMPHETAMINE SULFATE AND AMPHETAMINE SULFATE 5; 5; 5; 5 MG/1; MG/1; MG/1; MG/1
1 TABLET ORAL 2 TIMES DAILY
Qty: 60 TABLET | Refills: 0 | Status: SHIPPED | OUTPATIENT
Start: 2019-04-22 | End: 2019-02-22 | Stop reason: SDUPTHER

## 2019-02-21 RX ORDER — DEXTROAMPHETAMINE SACCHARATE, AMPHETAMINE ASPARTATE, DEXTROAMPHETAMINE SULFATE AND AMPHETAMINE SULFATE 5; 5; 5; 5 MG/1; MG/1; MG/1; MG/1
1 TABLET ORAL 2 TIMES DAILY
Qty: 60 TABLET | Refills: 0 | Status: SHIPPED | OUTPATIENT
Start: 2019-03-23 | End: 2019-02-22 | Stop reason: SDUPTHER

## 2019-02-21 RX ORDER — DEXTROAMPHETAMINE SACCHARATE, AMPHETAMINE ASPARTATE, DEXTROAMPHETAMINE SULFATE AND AMPHETAMINE SULFATE 5; 5; 5; 5 MG/1; MG/1; MG/1; MG/1
1 TABLET ORAL 2 TIMES DAILY
Qty: 60 TABLET | Refills: 0 | Status: SHIPPED | OUTPATIENT
Start: 2019-02-21 | End: 2019-06-20 | Stop reason: SDUPTHER

## 2019-02-21 RX ORDER — BUPROPION HYDROCHLORIDE 300 MG/1
300 TABLET ORAL DAILY
Qty: 90 TABLET | Refills: 3 | Status: SHIPPED | OUTPATIENT
Start: 2019-02-21 | End: 2019-10-16 | Stop reason: SDUPTHER

## 2019-02-21 NOTE — PROGRESS NOTES
"Outpatient Psychiatry Follow-Up Visit (MD/NP)  02/21/2019    Session Length:  30 minutes (E&M plus psychotherapy)     Clinical Status of Patient: Outpatient (Ambulatory)    Chief Complaint: Karen D Eleser is a 58 y.o. female who presents today for follow-up of chronic depression and anxiety.    Met with patient.     Interval History and Content of Current Session:  Interim Events/Subjective Report/Content of Current Session:  First f/u appt with me since 05/07/2018.  Pt had seen Dr. Tash Clark in the past; last appointment was on 9/28/2015 (this note was reviewed).    Plan from last session: "Continue Sonata (zaleplon) 10 mg one capsule at night to help fall back asleep.  Pt was told she can take this med as long as she can sleep for at least 4 hours after dosing.   Continue Cymbalta 120 mg daily  Continue Wellbutrin  mg total daily.  Pt has denied Hx of seizures.     Continue Xanax 0.5 mg BID prn anxiety  Pt can take OTC Benadryl or melatonin for sleep.   Also, continue Adderall immediate release 20 mg for treatment of fatigue/focus/concentration and treatment resistant depression.  I have recommended she take no > 60 mg max per day, preferably less.  Three Rx for Adderall 20 mg, each for a 30 day supply with no refills & with "Do not fill until" dates posted accordingly, were given to patient."     She states she has been "OK", except that she has not been sleeping well.    She has only been taking Benadryl 50 mg to sleep.    She had taken Xanax or Ambien.  She had to stop taking Ambien because it was causing visual hallucinations and illusions at night (i.e., TV cord looks like a snake), as well as sleepwalking and sleepeating behaviors.   She also had taken tizanidine.  Adderall actually makes her insomnia worse.    Discussed options -- I told pt she can keep taking Benadryl 50 mg qhs.  She can also use Xanax to help with sleep initiation.    I also stressed sleep hygiene therapy.    Also, she had " "tried the Sonata -- it did help her fall back asleep.  However, her insurance did not cover cost of this med.      She has NOT been taking the Adderall everyday.  "I don't feel like living a drugged existence".  She did take one this AM to be awake and alert for this AM appt today.    She states she normally sleeps until around 11 am - noon.       Current SIGECAPS:    Sleep -- has problems falling asleep; often must take sleep med to be able to fall asleep or go back to sleep.   Interests -- low in general ("I don't have anybody to do anything with" -- very few friends; had done a lot of things with her sister)    Guilt -- for "enabling my sister for so many years", not showing tough love.  Similar feelings with her mom (had to say "no" to her; feelings regarding circumstances surrounding her death).    Energy -- diminished    Concentration -- poor due to MS; has a hard time reading (Adderall helps)   Appetite -- "OK'   Psychomotor --  Somewhat decreased   Suicidal ideation -- denies   She does not feel optimistic about the future.        She has also used 50 mg Benadryl -- it seems to help OK with sleep.      I reviewed her MRI from 1/10/18 (she had her annual study due to MS).  It appears unremarkable regarding any changes in the past year.    She has numbness and tingling mainly in her L arm; it can also occur in her R arm as well.      She has stated she enjoys reading and watching certain TV shows, particularly historical based fiction.    She has a harder time reading since she was Dx with MS, as it has affected her ability to concentrate.    She states she has joined on line groups for various interests.     She enjoys doing things outdoors in cool weather.  She likes to travel.       Her main physical complaint is she has chronic painful neuropathy in her feet, legs due to lumbar disc herniation.    She has stated she has had problems with verbal expression of words that she wants to say (she is on " "gabapentin, which could have an effect on cognition).      Her father had endocarditis when he was younger.  He then had to have a pacemaker, then dual pacemaker/defibrillator.  He also had 3 sisters (her aunts) who all had heart disease (I did not asked if they smoked).  He had 3 older brothers who have been healthy.  She denies heart disease on her mother's side.    Her sister  from a massive MI at age 57 yo.  However, she abused pills and OTC meds and had gone to rehab at [facility] just months prior to her death.  She notes she and her sister were very close; they even lived together for a while.    Pt has denied ever having problems with her heart.  She stated she never had a stress test, but she has had "plenty of" EKG's.     She had been treated by Dr. Royce Pina for MS until his death in .  She has also been treated by other neurologists.  Dr. Maia Minaya is her neurologist currently.      Past psych meds: celexa, lexapro, and effexor in the past.    From previous sessions:  Regarding her mom and sister:  Mother passed: 2015. Sister passed: 2014. Sister had problems with drugs and alcohol, was a nurse and lost her nursing license.  She  from a massive MI while in the nursing home house after substance rehab.  She was close to them both.  She had enjoyed going to the movies, going out to eat, travel, etc.  However, she had always done these things with her sister, who  in 2014.  She has no one in her life that she is close to compared to her sister in the past.  She has a brother who is 6 years younger -- "he can be very abrasive, he talks real loud".  "He also has his life, his children".  She also notes he does not understand or appreciate her mental illness.   --Regarding past intimate relationships: She has had just one very serious relationship that lasted for 10 years.  She states the relationship ended because he lied to her & told her that he worked and stayed at his " mother's house, caring for her.  However, he did not work, lived off his mother's SS, then was kicked out of the mom's trailer by his siblings after she , so he was homeless for a while.  She thinks he may be living with his ex-wife now.  She states this happened about 5 years ago.    Since then, she had met 2 guys on line ([email or web address]) -- went out with each of them a few times, but nothing ever came of this.    She notes her mother was very controlling.  Pt also was shy growing up.  Pt denies ever being sexually molested.  She denies ever having problems with alcohol or drugs.  She is out on disability from work ([employer]) as a .    She is still getting a check through her work disability co.     PSYCHOTHERAPY ADD-ON [id number]   30 (16-37*) minutes   · Target symptoms: depression, anxiety, fatigue  · Why chosen therapy is appropriate versus another modality: relevant to diagnosis, patient responds to this modality  · Outcome monitoring methods: self-report  · Therapeutic intervention type: supportive psychotherapy  · Topics discussed/themes: SEE ABOVE-- illness/death of loved ones, stress related to medical comorbidities, life stage transitional issues, social isolation, self care/nurturing.  · The patient's response to the intervention is accepting.   · The patient's progress toward treatment goals is fair.  · Duration of intervention: 20 minutes      Review of Systems   · PSYCHIATRIC: Pertinant items are noted in the narrative.  · CONSTITUTIONAL: Some recent wt loss.  Chronic fatigue.     · MUSCULOSKELETAL: + chronic pain in her feet/legs  · NEUROLOGIC: No weakness, sensory changes, seizures, confusion, memory loss, tremor or other abnormal movements.  · ENDOCRINE: No polydipsia or polyuria.  · INTEGUMENTARY: No rashes or lacerations.  · EYES: No exophthalmos, jaundice or blindness.  · ENT: No dizziness, tinnitus or hearing loss.  · RESPIRATORY: No shortness of breath.  · CARDIOVASCULAR: No  tachycardia or chest pain.  · GASTROINTESTINAL: No nausea, vomiting, pain, constipation or diarrhea.  · GENITOURINARY: No frequency, dysuria.        Past Medical, Family and Social History: The patient's past medical, family and social history have been reviewed and updated as appropriate within the electronic medical record -- see encounter notes.    Current Medications:    Current Outpatient Medications:     ALPRAZolam (XANAX) 0.5 MG tablet, Take 1 tablet (0.5 mg total) by mouth 2 (two) times daily., Disp: 30 tablet, Rfl: 1    b complex vitamins (VITAMIN B COMPLEX) tablet, Take by mouth. 1 Tablet Oral Every day, Disp: , Rfl:     buPROPion (WELLBUTRIN XL) 300 MG 24 hr tablet, Take 1 tablet (300 mg total) by mouth once daily. Take with food., Disp: 90 tablet, Rfl: 3    cholecalciferol, vitamin D3, (VITAMIN D3) 5,000 unit Tab, Take 5,000 Units by mouth once daily. , Disp: , Rfl:     coenzyme Q10 (CO Q-10) 400 mg Cap, Take by mouth., Disp: , Rfl:     dextroamphetamine-amphetamine (ADDERALL) 20 mg tablet, Take 1 tablet by mouth 2 (two) times daily., Disp: 60 tablet, Rfl: 0    dextroamphetamine-amphetamine (ADDERALL) 20 mg tablet, Take 1 tablet by mouth 2 (two) times daily., Disp: 60 tablet, Rfl: 0    dextroamphetamine-amphetamine (ADDERALL) 20 mg tablet, Take 1 tablet by mouth 2 (two) times daily., Disp: 60 tablet, Rfl: 0    diclofenac sodium (VOLTAREN) 1 % Gel, Apply 2 g topically 4 (four) times daily. (Patient taking differently: Apply 2 g topically continuous prn. ), Disp: 5 Tube, Rfl: 3    DULoxetine (CYMBALTA) 60 MG capsule, Take 2 capsules (120 mg total) by mouth once daily., Disp: 180 capsule, Rfl: 0    gabapentin (NEURONTIN) 600 MG tablet, TAKE 2 TABLETS EVERY MORNING, 1 TABLET AT NOON AND 2 TABLETS AT BEDTIME, Disp: 450 tablet, Rfl: 1    glatiramer (COPAXONE) 40 mg/mL Syrg injection, INJECT ONE SYRINGE (40 MG) SUBCUTANEOUSLY 3 TIMES A WEEK AT LEAST 48 HOURS APART. ALLOW TO WARM TO ROOM TEMP FOR 20  "MINUTES. REFRIGERATE., Disp: 36 Syringe, Rfl: 1    ketoconazole (XOLEGEL) 2 % Gel, Apply topically., Disp: , Rfl:     latanoprost 0.005 % ophthalmic solution, Place 1 drop into both eyes every evening., Disp: 2.5 mL, Rfl: 5    meloxicam (MOBIC) 15 MG tablet, Take 1 tablet (15 mg total) by mouth once daily., Disp: 90 tablet, Rfl: 0    multivitamin (THERAGRAN) per tablet, Take by mouth. 1 Tablet Oral Every day, Disp: , Rfl:     PREVIDENT 5000 SENSITIVE 1.1-5 % Pste, Apply topically once daily. , Disp: , Rfl:     rosuvastatin (CRESTOR) 10 MG tablet, TAKE 1 TABLET (10 MG TOTAL) BY MOUTH EVERY EVENING., Disp: 90 tablet, Rfl: 1    tiZANidine (ZANAFLEX) 2 MG tablet, TAKE 1/2 TABLET BY MOUTH TWICE A DAY THEN 2 TABS AT BEDTIME AS NEEDED, Disp: 270 tablet, Rfl: 1    traMADol (ULTRAM) 50 mg tablet, Take 1-2 tablets ( mg total) by mouth every 6 (six) hours as needed for Pain., Disp: 120 tablet, Rfl: 0    VESICARE 5 mg tablet, TAKE 2 TABLETS BY MOUTH EVERY DAY, Disp: 180 tablet, Rfl: 1    zaleplon (SONATA) 10 MG capsule, Take 1 capsule (10 mg total) by mouth every evening., Disp: 30 capsule, Rfl: 5    She has not been taking zaleplon (only Benadryl for sleep) -- see above.      Compliance: yes    Side effects: Ambien -- visual hallucinations and illusions, sleepwalking/sleepeating.     Risk Parameters:  Patient reports no suicidal ideation  Patient reports no homicidal ideation  Patient reports no self-injurious behavior  Patient reports no violent behavior    Exam (detailed: at least 9 elements; comprehensive: all 15 elements)    Constitutional  Vitals:  Most recent vital signs, dated less than 90 days prior to this appointment, were reviewed:     Vitals - 1 value per visit 3/5/2018 3/13/2018 5/7/2018 2/21/2019   SYSTOLIC 138  154 139   DIASTOLIC 80  81 80   PULSE 79  95 83   RESPIRATIONS       SPO2 98      Weight (lb) 161.38  158.95 154.76   Weight (kg) 73.2  72.1 70.2   HEIGHT 5' 0"  5' 0" 5' 0"   BODY MASS " "INDEX 31.52  31.04 30.23   VISIT REPORT       Pain Score  0 0         Vitals - 1 value per visit 8/10/2017 11/2/2017 12/28/2017 2/1/2018   SYSTOLIC 138 144 120 145   DIASTOLIC 84 73 75 78   PULSE 95 104 91 88   RESPIRATIONS       SPO2       Weight (lb) 152 150 152 162.2   Weight (kg) 68.947 68.04 68.947 73.573   HEIGHT 5' 0" 5' 0"  5' 0"   BODY MASS INDEX 29.69 29.29 29.69 31.68   VISIT REPORT       Pain Score    6         General:  unremarkable, age appropriate, well dressed, neatly groomed. Not wearing make-up today (normal for her with Islam/charismatic elin)     Musculoskeletal  Muscle Strength/Tone:  no tremor, no tic    Gait & Station:  non-ataxic      Psychiatric  Speech:  normal tone, normal rate, normal pitch, normal volume, spontaneous    Mood & Affect:  "OK"  congruent and appropriate to situation/content. Appropriately tearful when discussing aspects of grief.    Thought Process:  normal and logical, goal-directed    Associations:  intact    Thought Content:  normal, no suicidality, no homicidality, delusions, or paranoia    Insight & Judgement:  good, intact  adequate to circumstances, age appropriate, good    Orientation:  grossly intact, person, place, time/date, situation    Memory:  intact for content of interview, grossly intact    Language:  grossly intact    Attention Span & Concentration:  able to focus    Fund of Knowledge:  intact and appropriate to age and level of education, familiar with aspects of current personal life      Prior EKG:  Test Reason : Z79.899  Vent. Rate : 077 BPM     Atrial Rate : 077 BPM     P-R Int : 136 ms          QRS Dur : 090 ms      QT Int : 406 ms       P-R-T Axes : 033 000 073 degrees     QTc Int : 459 ms  Normal sinus rhythm  Poor R wave progression  Abnormal ECG  When compared with ECG of 21-JUL-2008 08:45,  Nonspecific T wave abnormality no longer evident in Inferior leads  QT has lengthened  Confirmed by Yaneli Ferguson MD ([id number]) on 8/11/2017 4:36:19 " "PM      Assessment and Diagnosis    Status/Progress: Based on the examination today, the patient's problem(s) is/are adequately, but not ideally controlled. New problems have not been presented today. Comorbidities (chronic neuropathic pain) are complicating management of the primary condition. There are no active rule-out diagnoses for this patient at this time.    General Impression:   Major Depressive Disorder, Recurrent: Mild  Dysthymic Disorder   Generalized Anxiety Disorder  Chronic Insomnia   Multiple Sclerosis (causes cognitive difficulties)   Also: chronic pain, CHELSEA    Intervention/Counseling/Treatment Plan    Continue all current meds:  Continue Cymbalta 120 mg daily  Continue Wellbutrin  mg total daily.  Pt has denied Hx of seizures.     Continue Xanax 0.5 mg BID prn anxiety; can also use for sleep.    Pt can take OTC Benadryl or melatonin for sleep.   Also, continue Adderall immediate release 20 mg for treatment of fatigue/focus/concentration and treatment resistant depression.  I have recommended she take no > 60 mg max per day, preferably less.  Three Rx for Adderall 20 mg, each for a 30 day supply with no refills & with "Do not fill until" dates posted accordingly, were given to patient.   Encourage individual psychotherapy for support. Patient had been meeting with LCSW in Neurology clinic; she can continue this (if insurance allows) or consider f/u with Dr. Nery Toledo or other therapist in our clinic.        Additional Notes:  N/A      Return to Clinic: 4 months, or sooner prn.  "

## 2019-02-22 ENCOUNTER — OFFICE VISIT (OUTPATIENT)
Dept: NEUROLOGY | Facility: CLINIC | Age: 59
End: 2019-02-22
Payer: COMMERCIAL

## 2019-02-22 VITALS
DIASTOLIC BLOOD PRESSURE: 86 MMHG | SYSTOLIC BLOOD PRESSURE: 135 MMHG | HEIGHT: 60 IN | HEART RATE: 83 BPM | WEIGHT: 154 LBS | BODY MASS INDEX: 30.23 KG/M2

## 2019-02-22 DIAGNOSIS — G47.30 SLEEP APNEA, UNSPECIFIED TYPE: ICD-10-CM

## 2019-02-22 DIAGNOSIS — G35 MULTIPLE SCLEROSIS: Primary | ICD-10-CM

## 2019-02-22 DIAGNOSIS — G47.33 OSA (OBSTRUCTIVE SLEEP APNEA): ICD-10-CM

## 2019-02-22 DIAGNOSIS — Z71.89 COUNSELING REGARDING GOALS OF CARE: ICD-10-CM

## 2019-02-22 DIAGNOSIS — R53.82 CHRONIC FATIGUE: ICD-10-CM

## 2019-02-22 DIAGNOSIS — R25.2 SPASTICITY: ICD-10-CM

## 2019-02-22 DIAGNOSIS — Z29.89 PROPHYLACTIC IMMUNOTHERAPY: ICD-10-CM

## 2019-02-22 DIAGNOSIS — F33.1 MAJOR DEPRESSIVE DISORDER, RECURRENT, MODERATE: ICD-10-CM

## 2019-02-22 DIAGNOSIS — R41.9 COGNITIVE COMPLAINTS: ICD-10-CM

## 2019-02-22 PROCEDURE — 99999 PR PBB SHADOW E&M-EST. PATIENT-LVL V: ICD-10-PCS | Mod: PBBFAC,,, | Performed by: PHYSICIAN ASSISTANT

## 2019-02-22 PROCEDURE — 99999 PR PBB SHADOW E&M-EST. PATIENT-LVL V: CPT | Mod: PBBFAC,,, | Performed by: PHYSICIAN ASSISTANT

## 2019-02-22 PROCEDURE — 99214 OFFICE O/P EST MOD 30 MIN: CPT | Mod: S$GLB,,, | Performed by: PHYSICIAN ASSISTANT

## 2019-02-22 PROCEDURE — 99214 PR OFFICE/OUTPT VISIT, EST, LEVL IV, 30-39 MIN: ICD-10-PCS | Mod: S$GLB,,, | Performed by: PHYSICIAN ASSISTANT

## 2019-02-22 PROCEDURE — 3008F BODY MASS INDEX DOCD: CPT | Mod: CPTII,S$GLB,, | Performed by: PHYSICIAN ASSISTANT

## 2019-02-22 PROCEDURE — 3008F PR BODY MASS INDEX (BMI) DOCUMENTED: ICD-10-PCS | Mod: CPTII,S$GLB,, | Performed by: PHYSICIAN ASSISTANT

## 2019-02-22 RX ORDER — TIZANIDINE 2 MG/1
TABLET ORAL
Qty: 270 TABLET | Refills: 1 | Status: SHIPPED | OUTPATIENT
Start: 2019-02-22 | End: 2019-07-28 | Stop reason: SDUPTHER

## 2019-02-22 RX ORDER — ROSUVASTATIN CALCIUM 10 MG/1
10 TABLET, COATED ORAL NIGHTLY
Qty: 90 TABLET | Refills: 1 | Status: SHIPPED | OUTPATIENT
Start: 2019-02-22 | End: 2019-08-05 | Stop reason: SDUPTHER

## 2019-02-22 NOTE — PROGRESS NOTES
"Subjective:       Patient ID: Karen D Eleser is a 58 y.o. female who presents today for a routine clinic visit for MS.  [facility] to [facility] July 2017 with this provider. Patient was unfortunately 25 minutes late to visit today.     MS HPI:  · DMT: Copaxone  · Side effects from DMT? No  · Taking vitamin D3 as recommended? Yes -5,000IU    · Continues to see Dr. Tyler(psychiatry)  · Continues to see Rheumatology for osteoarthritis and fibromyalgia management    SOCIAL HISTORY  Social History     Tobacco Use    Smoking status: Never Smoker    Smokeless tobacco: Never Used   Substance Use Topics    Alcohol use: Yes     Comment: rare    Drug use: No      Living arrangements - the patient lives alone.  Employment N/A - Disability-declined for social security      MS ROS:  · Fatigue: Yes - taking Adderall(taking about 3 times weekly) and continues to have fatigue; Adderall is prescribed by Dr. Tyler;   · Sleep Disturbance: Yes - CPAP -not using. She has tried 3 different masks;   · Bladder Dysfunction: Yes -stable with Vesicare   · Bowel Dysfunction: No  · Spasticity: Yes - taking tizanidine (1mg/1mg) during the day PRN and 4mg HS--requests refill  · Cognitive: Yes - feels this is getting worse; would like to repeat NP testing last in 2009(notes in [facility] documents)  · Mood Disorder: Yes - sees Dr. Tyler regularly; Wellbutrin -She is not currently in counseling  · Gait Disturbance: Yes--slightly off balance, but overall stable  · Falls: Yes-fell while on vacation twice(situations were challenging environments.   · Hand Dysfunction: Yes - numbness L UE  · Pain: Yes - has diagnosis of fibromyalgia and sees Rheumatology; on Cymbalta and gabapentin. Low back hurts "all the time"-chronic--had injections done by Dr. Jacome in the past --is going to establish care with a pain management provider close to home  · Sexual Dysfunction: Not Assessed  · Skin Breakdown: No  · Tremors: No  · Dysphagia: No  · Dysarthria: " No  · Heat sensitivity: Yes--manages with avoidance; has cooling towels;    · Any un-met adaptive needs? No  · Copay Assist? Yes -$0              Objective:        1. 25 foot timed walk:5.2s today  Timed 25 Foot Walk: 2016   Did patient wear an AFO? No No   Was assistive device used? No No   Time for 25 Foot Walk (seconds) 5.2 5   Time for 25 Foot Walk (seconds) 5.3 -       Neurologic Exam     Mental Status   Oriented to person, place, and time.   Follows 3 step commands.   Speech: speech is normal   Level of consciousness: alert  Normal comprehension.     Cranial Nerves     CN III, IV, VI   Extraocular motions are normal.   Right pupil: Shape: regular.   Left pupil: Shape: regular.     CN V   Facial sensation intact.     CN VII   Facial expression full, symmetric.     CN VIII   Hearing: intact (finger rub)    CN IX, X   Palate: symmetric    CN XI   CN XI normal.     CN XII   Tongue deviation: none    Motor Exam   Muscle bulk: normal  Overall muscle tone: normal    Strength   Right deltoid: 5/5  Left deltoid: 5/5  Right triceps: 5/5  Left triceps: 5/5  Right wrist extension: 5/5  Left wrist extension: 5/5  Right interossei: 5/5  Left interossei: 5/5  Right iliopsoas: 5/5  Left iliopsoas: 5/5  Right hamstrin/5  Left hamstrin/5  Right anterior tibial: 5/5  Left anterior tibial: 5/5  Right peroneal: 5/5  Left peroneal: 5/5    Sensory Exam   Light touch normal.   Left arm light touch: tingling.  Left leg light touch: tingling.  Right arm vibration: decreased from fingers  Left arm vibration: decreased from fingers  Right leg vibration: decreased from toes  Left leg vibration: decreased from toes    Gait, Coordination, and Reflexes     Gait  Gait: wide-based    Coordination   Finger to nose coordination: normal  Tandem walking coordination: abnormal    Tremor   Resting tremor: absent  Action tremor: absent    Reflexes   Right brachioradialis: 2+  Left brachioradialis: 2+  Right biceps: 2+  Left  biceps: 2+  Right triceps: 2+  Left triceps: 2+  Right patellar: 3+  Left patellar: 3+  Right achilles: 1+  Left achilles: 1+            Imaging:       Results for orders placed during the hospital encounter of 01/10/18   MRI Brain W WO Contrast    Narrative Routine multiplanar imaging through this 57 year-old females brain was obtained per the demyelinating protocol with comparison made to 1/5/17.    Numerous foci of periventricular and pericallosal white matter signal abnormalities are noted including a long the undersurface of the corpus callosum in the region of the genu, anterior body, and splenium unchanged.    A white matter focus is noted along the left lateral ventricle perpendicular to the ventricle as on the prior as can be seen with a Lara's finger.    The ventricles and sulci appear mildly prominent for the patient's age suggestive of involutional changes slightly greater than expected.    No detrimental change in the distribution or appearance of the white matter foci are noted since the prior exam.    Choroidal fissure cysts are noted bilaterally.    Fluid signal intensities are noted within each maxillary sinus as can be seen with acute sinus disease bilaterally    No diffusion positivity is identified to suggest recent infarction when the ADC map is taken into account    Impression 1. White matter changes greater than expected for the patient's age with the distribution and appearance consistent with given history of multiple sclerosis without obvious detrimental change since 1/5/17. In particular no enhancement or diffusion positivity is noted.      Electronically signed by: Aryan Diego MD  Date:     01/10/18  Time:    15:24      Results for orders placed during the hospital encounter of 01/05/17   MRI Cervical Spine W WO Cont    Narrative Routine multiplanar imaging through this 56 year-old cervical spine was obtained per the demyelinating protocol with the addition of a STIR weighted sequence  and post contrast imaging with utilization of 7 cc of gadavist contrast and comparison is made with 8/27/15.    No worrisome enhancing cord lesions are noted. No STIR weighted signal abnormality is noted to identify obvious cord demyelinating plaque or osseous aggressive process.    Vertebral body heights are relatively well-preserved. Intervertebral disc height loss is noted at the C4-C5 C5-C6 and C6-C7 levels that is mild. A minimal anterior listhesis is noted of the C3 on C4 vertebral body of 3 mm unchanged      At the C2-C3 level,  no significant disk bulge, central canal stenosis, or neural foraminal stenosis is noted    At the C3-C4 level, uncovertebral spurring is noted towards the right nerve foramen of 3 mm with mild disc osteophyte spurring towards the left nerve foramen. Facet arthropathy is noted bilaterally. Mild to moderate right neural foraminal stenosis is noted. Mild left neural foraminal stenosis is noted. Minimal central canal narrowing is noted.    At the C4-C5 level, facet arthropathy is noted bilaterally. Minimal broadbase bulge and uncovertebral spur and is noted of 1-2 mm. Minimal neuroforaminal narrowing without significant central canal stenosis noted.    At the C5-C6 level, broad-based disc osteophyte bulging is noted far laterally and towards each neural foramen of 2-3 mm. Facet arthropathy is noted bilaterally. Broad-based bulging is noted of 2-3 mm towards the right neural foramen greater than left. Moderate right nerve foraminal stenosis appears to be present with mild left neural foraminal stenosis and mild central canal narrowing.    At the C6-C7 level,  no significant disk bulge, central canal stenosis, or neural foraminal stenosis is noted      At the C7-T1 level,  no significant disk bulge, central canal stenosis, or neural foraminal stenosis is noted    Impression  No obvious demyelinating plaques are noted within the cervical spine. Motion artifact mildly hinders the evaluation.  Mild degenerative changes are noted as described above          Electronically signed by: Aryan Diego MD  Date:     01/05/17  Time:    12:37          Labs:     Lab Results   Component Value Date    XYCDJNTE04AZ 39 12/13/2016    BLXNFGVZ09GP 76 01/22/2016    AZUWMYQV03QR 70 04/08/2014     No results found for: JCVINDEX, JCVANTIBODY  No results found for: SP8WWFIZ, ABSOLUTECD3, BE2INZAG, ABSOLUTECD8, DH8YAFCA, ABSOLUTECD4, LABCD48  Lab Results   Component Value Date    WBC 7.10 04/24/2015    RBC 4.04 04/24/2015    HGB 12.7 04/24/2015    HCT 39.8 04/24/2015    MCV 99 (H) 04/24/2015    MCH 31.4 (H) 04/24/2015    MCHC 31.9 (L) 04/24/2015    RDW 14.4 04/24/2015     04/24/2015    MPV 10.5 04/24/2015    GRAN 4.0 04/24/2015    GRAN 55.8 04/24/2015    LYMPH 2.2 04/24/2015    LYMPH 30.3 04/24/2015    MONO 0.6 04/24/2015    MONO 8.5 04/24/2015    EOS 0.3 04/24/2015    BASO 0.02 04/24/2015    EOSINOPHIL 4.8 04/24/2015    BASOPHIL 0.3 04/24/2015     Sodium   Date Value Ref Range Status   04/24/2015 143 136 - 145 mmol/L Final     Potassium   Date Value Ref Range Status   04/24/2015 4.3 3.5 - 5.1 mmol/L Final     Chloride   Date Value Ref Range Status   04/24/2015 104 95 - 110 mmol/L Final     CO2   Date Value Ref Range Status   04/24/2015 30 (H) 23 - 29 mmol/L Final     Glucose   Date Value Ref Range Status   04/24/2015 92 70 - 110 mg/dL Final     BUN, Bld   Date Value Ref Range Status   04/24/2015 18 6 - 20 mg/dL Final     Creatinine   Date Value Ref Range Status   04/24/2015 0.9 0.5 - 1.4 mg/dL Final     Calcium   Date Value Ref Range Status   04/24/2015 9.8 8.7 - 10.5 mg/dL Final     Total Protein   Date Value Ref Range Status   04/24/2015 7.0 6.0 - 8.4 g/dL Final     Albumin   Date Value Ref Range Status   04/24/2015 3.6 3.5 - 5.2 g/dL Final     Total Bilirubin   Date Value Ref Range Status   04/24/2015 0.3 0.1 - 1.0 mg/dL Final     Comment:     For infants and newborns, interpretation of results should be based  on  gestational age, weight and in agreement with clinical  observations.  Premature Infant recommended reference ranges:  Up to 24 hours.............<8.0 mg/dL  Up to 48 hours............<12.0 mg/dL  3-5 days..................<15.0 mg/dL  6-29 days.................<15.0 mg/dL       Alkaline Phosphatase   Date Value Ref Range Status   04/24/2015 73 55 - 135 U/L Final     AST   Date Value Ref Range Status   04/24/2015 25 10 - 40 U/L Final     ALT   Date Value Ref Range Status   04/24/2015 20 10 - 44 U/L Final     Anion Gap   Date Value Ref Range Status   04/24/2015 9 8 - 16 mmol/L Final     eGFR if    Date Value Ref Range Status   04/24/2015 >60.0 >60 mL/min/1.73 m^2 Final     eGFR if non    Date Value Ref Range Status   04/24/2015 >60.0 >60 mL/min/1.73 m^2 Final     Comment:     Calculation used to obtain the estimated glomerular filtration  rate (eGFR) is the CKD-EPI equation. Since race is unknown   in our information system, the eGFR values for   -American and Non--American patients are given   for each creatinine result.         Diagnosis/Assessment/Plan:    1. Multiple Sclerosis  · Assessment: She is stable clinically. She was due for MRI Brain in January 2019.   · Imaging: will plan for Brain MRI(patient will let us know about location)--she may want to have done outside [facility]. She will check with other provider who will also want MRI's(lumbar spine) and try to do them together  · Disease Modifying Therapies: Continue glatiramer and high dose VitD3(PCP ordered Vit D-patient will have scheduled)    2. MS Symptom Assessment / Management  · Spasticity: refilled Tizanidine  · Sleep: encouraged use of CPAP nightly  · Cognitive: repeat NP testing ordered(last done 2009)-in media  · Mood Disorder: managed by psychiatry    Over 50% of this 25 minute visit was spent in direct face to face counseling of the patient about MS, DMT considerations, and MS symptom management.    Follow-up in about 6 months (around 8/22/2019) for follow up with Dr. Minaya.  Patient agreed to POC today.    Attending, Dr. Minaya, was available during today's encounter.     Daniela Mascorro PA-C  [facility]      Problem List Items Addressed This Visit        Neuro    Multiple sclerosis - Primary    Relevant Medications    tiZANidine (ZANAFLEX) 2 MG tablet    Other Relevant Orders    Ambulatory Referral to Neuropsychology       Other    Prophylactic immunotherapy    Counseling regarding goals of care    Fatigue    Relevant Orders    Ambulatory Referral to Neuropsychology      Other Visit Diagnoses     Major depressive disorder, recurrent, moderate        Relevant Orders    Ambulatory Referral to Neuropsychology    CHELSEA (obstructive sleep apnea)        Spasticity        Relevant Medications    tiZANidine (ZANAFLEX) 2 MG tablet    Cognitive complaints        Relevant Orders    Ambulatory Referral to Neuropsychology    Sleep apnea, unspecified type        Relevant Orders    Ambulatory Referral to Neuropsychology

## 2019-03-08 ENCOUNTER — TELEPHONE (OUTPATIENT)
Dept: RHEUMATOLOGY | Facility: CLINIC | Age: 59
End: 2019-03-08

## 2019-03-13 ENCOUNTER — PATIENT MESSAGE (OUTPATIENT)
Dept: RHEUMATOLOGY | Facility: CLINIC | Age: 59
End: 2019-03-13

## 2019-03-15 ENCOUNTER — INITIAL CONSULT (OUTPATIENT)
Dept: RHEUMATOLOGY | Facility: CLINIC | Age: 59
End: 2019-03-15
Payer: COMMERCIAL

## 2019-03-15 VITALS
HEIGHT: 60 IN | HEART RATE: 96 BPM | DIASTOLIC BLOOD PRESSURE: 88 MMHG | SYSTOLIC BLOOD PRESSURE: 139 MMHG | BODY MASS INDEX: 30.08 KG/M2

## 2019-03-15 DIAGNOSIS — M79.7 PRIMARY FIBROMYALGIA: Primary | ICD-10-CM

## 2019-03-15 PROCEDURE — 3008F BODY MASS INDEX DOCD: CPT | Mod: CPTII,S$GLB,, | Performed by: INTERNAL MEDICINE

## 2019-03-15 PROCEDURE — 99214 PR OFFICE/OUTPT VISIT, EST, LEVL IV, 30-39 MIN: ICD-10-PCS | Mod: S$GLB,,, | Performed by: INTERNAL MEDICINE

## 2019-03-15 PROCEDURE — 99214 OFFICE O/P EST MOD 30 MIN: CPT | Mod: S$GLB,,, | Performed by: INTERNAL MEDICINE

## 2019-03-15 PROCEDURE — 99999 PR PBB SHADOW E&M-EST. PATIENT-LVL IV: CPT | Mod: PBBFAC,,, | Performed by: INTERNAL MEDICINE

## 2019-03-15 PROCEDURE — 99999 PR PBB SHADOW E&M-EST. PATIENT-LVL IV: ICD-10-PCS | Mod: PBBFAC,,, | Performed by: INTERNAL MEDICINE

## 2019-03-15 PROCEDURE — 3008F PR BODY MASS INDEX (BMI) DOCUMENTED: ICD-10-PCS | Mod: CPTII,S$GLB,, | Performed by: INTERNAL MEDICINE

## 2019-03-15 RX ORDER — TRAMADOL HYDROCHLORIDE 50 MG/1
50-100 TABLET ORAL EVERY 6 HOURS PRN
Qty: 120 TABLET | Refills: 5 | Status: SHIPPED | OUTPATIENT
Start: 2019-03-15 | End: 2019-06-20 | Stop reason: SDUPTHER

## 2019-03-15 RX ORDER — TRAMADOL HYDROCHLORIDE 50 MG/1
50-100 TABLET ORAL EVERY 6 HOURS PRN
Qty: 120 TABLET | Refills: 5 | Status: SHIPPED | OUTPATIENT
Start: 2019-03-15 | End: 2019-03-15 | Stop reason: SDUPTHER

## 2019-03-15 NOTE — PROGRESS NOTES
Subjective:       Patient ID: Karen D Eleser is a 58 y.o. female.    Chief Complaint: No chief complaint on file.    HPI  58 year old F with PMH of LIZZETH, depression, fibromyalgia,  MS here for follow up.  She is a patient of Dr. Rios. She was diagnosed with fibromyalgia in December 2005.  She reports her left arm is numb from MS.  She has neuropathy in her feet. She has pain in shoulder blades and buttocks. She takes gabapentin 600mg ( 2 in morning, 1 midday, and 2 at night).She takes tramadol 2 in the morning,2 at night, and sometimes 2 at night. Reports that her pain level is 7/10 aching, non radiating diffusely. Denies oral ulcers, fevers, raynauds, or sob.           Past Medical History:   Diagnosis Date    Arthritis     Chronic insomnia 4/14/2016    DDD (degenerative disc disease), lumbar 7/23/2013    Depression     Dysthymic disorder 3/30/2015    Fatigue     Fibromyalgia     LIZZETH (generalized anxiety disorder) 4/14/2016    Hyperlipidemia     Major depressive disorder, recurrent, moderate 4/14/2016    Multiple sclerosis        Review of Systems   Constitutional: Positive for fatigue. Negative for activity change, appetite change, chills, diaphoresis, fever and unexpected weight change.   HENT: Negative for congestion, ear discharge, ear pain, facial swelling, mouth sores, sinus pressure, sneezing, sore throat, tinnitus and trouble swallowing.    Eyes: Negative for photophobia, pain, discharge, redness, itching and visual disturbance.   Respiratory: Negative for apnea, chest tightness, shortness of breath, wheezing and stridor.    Cardiovascular: Negative for leg swelling.   Gastrointestinal: Negative for abdominal distention, abdominal pain, anal bleeding, blood in stool, constipation, diarrhea and nausea.   Endocrine: Negative for cold intolerance and heat intolerance.   Genitourinary: Negative for difficulty urinating and dysuria.   Musculoskeletal: Positive for arthralgias and myalgias. Negative  for back pain, gait problem, joint swelling, neck pain and neck stiffness.   Skin: Negative for color change, pallor, rash and wound.   Neurological: Negative for dizziness, seizures, light-headedness and numbness.   Hematological: Negative for adenopathy. Does not bruise/bleed easily.   Psychiatric/Behavioral: Negative for sleep disturbance. The patient is not nervous/anxious.              Objective:   /88   Pulse 96   Ht 5' (1.524 m)   LMP  (LMP Unknown)   BMI 30.08 kg/m²      Physical Exam   Constitutional: She is oriented to person, place, and time.   HENT:   Head: Normocephalic and atraumatic.   Right Ear: External ear normal.   Left Ear: External ear normal.   Nose: Nose normal.   Mouth/Throat: Oropharynx is clear and moist. No oropharyngeal exudate.   Eyes: Conjunctivae and EOM are normal. Pupils are equal, round, and reactive to light. Right eye exhibits no discharge. Left eye exhibits no discharge. No scleral icterus.   Neck: Neck supple. No JVD present. No thyromegaly present.   Cardiovascular: Normal rate, regular rhythm, normal heart sounds and intact distal pulses.  Exam reveals no gallop and no friction rub.    No murmur heard.  Pulmonary/Chest: Effort normal and breath sounds normal. No respiratory distress. She has no wheezes. She has no rales. She exhibits no tenderness.   Abdominal: Soft. Bowel sounds are normal. She exhibits no distension and no mass. There is no tenderness. There is no rebound and no guarding.   Lymphadenopathy:     She has no cervical adenopathy.   Neurological: She is alert and oriented to person, place, and time. No cranial nerve deficit. Gait normal. Coordination normal.   Skin: Skin is dry. No rash noted. No erythema. No pallor.     Psychiatric: Affect and judgment normal.   Musculoskeletal: She exhibits tenderness. She exhibits no edema or deformity.   Diffuse tenderness in muscles           Labs: reviewed    Assessment:     58 year old F with PMH of LIZZETH,  depression, fibromyalgia,  MS here for follow up.  Patient on up to 6 pills of tramadol a day. I told her I am not comfortable prescribing that amount.    Plan:        tramadol script given  Follow up with pain specialist    *

## 2019-03-21 ENCOUNTER — PATIENT MESSAGE (OUTPATIENT)
Dept: NEUROLOGY | Facility: CLINIC | Age: 59
End: 2019-03-21

## 2019-03-22 ENCOUNTER — PATIENT MESSAGE (OUTPATIENT)
Dept: NEUROLOGY | Facility: CLINIC | Age: 59
End: 2019-03-22

## 2019-04-01 ENCOUNTER — PATIENT MESSAGE (OUTPATIENT)
Dept: NEUROLOGY | Facility: CLINIC | Age: 59
End: 2019-04-01

## 2019-04-01 RX ORDER — GABAPENTIN 600 MG/1
TABLET ORAL
Qty: 450 TABLET | Refills: 1 | Status: SHIPPED | OUTPATIENT
Start: 2019-04-01 | End: 2019-09-30 | Stop reason: SDUPTHER

## 2019-04-03 ENCOUNTER — PATIENT MESSAGE (OUTPATIENT)
Dept: NEUROLOGY | Facility: CLINIC | Age: 59
End: 2019-04-03

## 2019-04-03 DIAGNOSIS — G35 MULTIPLE SCLEROSIS: Primary | ICD-10-CM

## 2019-05-01 DIAGNOSIS — H40.053 OCULAR HYPERTENSION, BILATERAL: ICD-10-CM

## 2019-05-01 RX ORDER — LATANOPROST 50 UG/ML
1 SOLUTION/ DROPS OPHTHALMIC NIGHTLY
Qty: 7.5 ML | Refills: 2 | Status: SHIPPED | OUTPATIENT
Start: 2019-05-01 | End: 2020-05-25 | Stop reason: SDUPTHER

## 2019-05-02 ENCOUNTER — TELEPHONE (OUTPATIENT)
Dept: OPHTHALMOLOGY | Facility: CLINIC | Age: 59
End: 2019-05-02

## 2019-05-02 RX ORDER — MELOXICAM 15 MG/1
TABLET ORAL
Qty: 90 TABLET | Refills: 6 | Status: SHIPPED | OUTPATIENT
Start: 2019-05-02 | End: 2021-10-15

## 2019-05-07 ENCOUNTER — TELEPHONE (OUTPATIENT)
Dept: OPTOMETRY | Facility: CLINIC | Age: 59
End: 2019-05-07

## 2019-05-15 ENCOUNTER — HOSPITAL ENCOUNTER (OUTPATIENT)
Dept: RADIOLOGY | Facility: HOSPITAL | Age: 59
Discharge: HOME | End: 2019-05-15
Attending: PAIN MEDICINE
Payer: COMMERCIAL

## 2019-05-15 ENCOUNTER — PATIENT MESSAGE (OUTPATIENT)
Dept: NEUROLOGY | Facility: CLINIC | Age: 59
End: 2019-05-15

## 2019-05-15 DIAGNOSIS — G35 MULTIPLE SCLEROSIS: ICD-10-CM

## 2019-05-15 DIAGNOSIS — M50.122 CERVICAL DISC DISORDER AT C5-C6 LEVEL WITH RADICULOPATHY: ICD-10-CM

## 2019-05-15 DIAGNOSIS — G89.4 CHRONIC PAIN DISORDER: ICD-10-CM

## 2019-05-15 DIAGNOSIS — M54.16 LUMBAR RADICULOPATHY: ICD-10-CM

## 2019-05-15 PROCEDURE — A9585 GADOBUTROL INJECTION: HCPCS | Mod: PO | Performed by: PAIN MEDICINE

## 2019-05-15 PROCEDURE — 25000200 PHARM REV CODE 250 W HCPCS: Mod: PO | Performed by: PAIN MEDICINE

## 2019-05-15 PROCEDURE — 72040 X-RAY EXAM NECK SPINE 2-3 VW: CPT | Mod: TC,FY,PO

## 2019-05-15 PROCEDURE — 70553 MRI BRAIN STEM W/O & W/DYE: CPT | Mod: 26,,, | Performed by: RADIOLOGY

## 2019-05-15 PROCEDURE — 72157 MRI CHEST SPINE W/O & W/DYE: CPT | Mod: 26,,, | Performed by: RADIOLOGY

## 2019-05-15 PROCEDURE — 72156 MRI NECK SPINE W/O & W/DYE: CPT | Mod: TC,PO

## 2019-05-15 PROCEDURE — 70553 MRI BRAIN W WO CONTRAST: ICD-10-PCS | Mod: 26,,, | Performed by: RADIOLOGY

## 2019-05-15 PROCEDURE — 72157 MRI CHEST SPINE W/O & W/DYE: CPT | Mod: TC,PO

## 2019-05-15 PROCEDURE — 72040 XR CERVICAL SPINE FLEXION  AND EXTENSION ONLY: ICD-10-PCS | Mod: 26,,, | Performed by: RADIOLOGY

## 2019-05-15 PROCEDURE — 72120 X-RAY BEND ONLY L-S SPINE: CPT | Mod: 26,,, | Performed by: RADIOLOGY

## 2019-05-15 PROCEDURE — 72120 X-RAY BEND ONLY L-S SPINE: CPT | Mod: TC,FY,PO

## 2019-05-15 PROCEDURE — 72148 MRI LUMBAR SPINE W/O DYE: CPT | Mod: 26,,, | Performed by: RADIOLOGY

## 2019-05-15 PROCEDURE — 72040 X-RAY EXAM NECK SPINE 2-3 VW: CPT | Mod: 26,,, | Performed by: RADIOLOGY

## 2019-05-15 PROCEDURE — 72157 MRI THORACIC SPINE W WO CONTRAST: ICD-10-PCS | Mod: 26,,, | Performed by: RADIOLOGY

## 2019-05-15 PROCEDURE — 70553 MRI BRAIN STEM W/O & W/DYE: CPT | Mod: TC,PO

## 2019-05-15 PROCEDURE — 72120 XR LUMBAR SPINE FLEXION AND EXTENSION ONLY: ICD-10-PCS | Mod: 26,,, | Performed by: RADIOLOGY

## 2019-05-15 PROCEDURE — 72148 MRI LUMBAR SPINE W/O DYE: CPT | Mod: TC,PO

## 2019-05-15 PROCEDURE — 72156 MRI CERVICAL SPINE W WO CONTRAST: ICD-10-PCS | Mod: 26,,, | Performed by: RADIOLOGY

## 2019-05-15 PROCEDURE — 72156 MRI NECK SPINE W/O & W/DYE: CPT | Mod: 26,,, | Performed by: RADIOLOGY

## 2019-05-15 PROCEDURE — 72148 MRI LUMBAR SPINE WITHOUT CONTRAST: ICD-10-PCS | Mod: 26,,, | Performed by: RADIOLOGY

## 2019-05-15 RX ADMIN — GADOBUTROL 7 ML: 604.72 INJECTION INTRAVENOUS at 02:05

## 2019-06-14 ENCOUNTER — INITIAL CONSULT (OUTPATIENT)
Dept: NEUROLOGY | Facility: CLINIC | Age: 59
End: 2019-06-14
Payer: COMMERCIAL

## 2019-06-14 DIAGNOSIS — F41.1 GAD (GENERALIZED ANXIETY DISORDER): ICD-10-CM

## 2019-06-14 DIAGNOSIS — F34.1 DYSTHYMIC DISORDER: ICD-10-CM

## 2019-06-14 DIAGNOSIS — G35 MULTIPLE SCLEROSIS: ICD-10-CM

## 2019-06-14 DIAGNOSIS — G31.84 MILD NEUROCOGNITIVE DISORDER: Primary | ICD-10-CM

## 2019-06-14 PROCEDURE — 99499 NO LOS: ICD-10-PCS | Mod: S$GLB,,, | Performed by: CLINICAL NEUROPSYCHOLOGIST

## 2019-06-14 PROCEDURE — 96139 PSYCL/NRPSYC TST TECH EA: CPT | Mod: S$GLB,,, | Performed by: CLINICAL NEUROPSYCHOLOGIST

## 2019-06-14 PROCEDURE — 90791 PR PSYCHIATRIC DIAGNOSTIC EVALUATION: ICD-10-PCS | Mod: S$GLB,,, | Performed by: CLINICAL NEUROPSYCHOLOGIST

## 2019-06-14 PROCEDURE — 99499 UNLISTED E&M SERVICE: CPT | Mod: S$GLB,,, | Performed by: CLINICAL NEUROPSYCHOLOGIST

## 2019-06-14 PROCEDURE — 96132 NRPSYC TST EVAL PHYS/QHP 1ST: CPT | Mod: S$GLB,,, | Performed by: CLINICAL NEUROPSYCHOLOGIST

## 2019-06-14 PROCEDURE — 96132 PR NEUROPSYCHOLOGIC TEST EVAL SVCS, 1ST HR: ICD-10-PCS | Mod: S$GLB,,, | Performed by: CLINICAL NEUROPSYCHOLOGIST

## 2019-06-14 PROCEDURE — 96139 PR PSYCH/NEUROPSYCH TEST ADMIN/SCORING, BY TECH, 2+ TESTS, EA ADDTL 30 MIN: ICD-10-PCS | Mod: S$GLB,,, | Performed by: CLINICAL NEUROPSYCHOLOGIST

## 2019-06-14 PROCEDURE — 96133 PR NEUROPSYCHOLOGIC TEST EVAL SVCS, EA ADDTL HR: ICD-10-PCS | Mod: S$GLB,,, | Performed by: CLINICAL NEUROPSYCHOLOGIST

## 2019-06-14 PROCEDURE — 90791 PSYCH DIAGNOSTIC EVALUATION: CPT | Mod: S$GLB,,, | Performed by: CLINICAL NEUROPSYCHOLOGIST

## 2019-06-14 PROCEDURE — 96138 PSYCL/NRPSYC TECH 1ST: CPT | Mod: S$GLB,,, | Performed by: CLINICAL NEUROPSYCHOLOGIST

## 2019-06-14 PROCEDURE — 96133 NRPSYC TST EVAL PHYS/QHP EA: CPT | Mod: S$GLB,,, | Performed by: CLINICAL NEUROPSYCHOLOGIST

## 2019-06-14 PROCEDURE — 96138 PR PSYCH/NEUROPSYCH TEST ADMIN/SCORING, BY TECH, 2+ TESTS, 1ST 30 MIN: ICD-10-PCS | Mod: S$GLB,,, | Performed by: CLINICAL NEUROPSYCHOLOGIST

## 2019-06-14 NOTE — PROGRESS NOTES
"Outpatient Neuropsychological Evaluation    Referral Information  Name: Karen D Eleser  MRN: [id number]  RIOS: 2019  : 1960  Age: 58 y.o.  Handedness: Right  Race: White  Gender: female  Referring Provider: Daniela Mascorro Pa-c  [address] [place] [address]  Referral Reason/Medical Necessity: Cognitive difficulties with neuropsychological evaluation ordered by Neurology to characterize cognitive status, differential diagnosis, and updated treatment recommendations.   Billing: See at the end of the report as billing and coding has changed in 2019.  Consent: The patient expressed an understanding of the purpose of the evaluation and consented to all procedures.    HPI  Active problems noted below. Ms. Eleser has been diagnosed with MS since  and followed by our [facility].     Current Symptoms  Cognitive Sxs:  · Attention: No major trouble  · Mental Speed: Slowed processing speed that has progressively worsened.   · Memory: Progressively decline in short-term memory mostly for misplacing personal articles. She denied any major trouble remembering appointments, medication, or conversations.    · Language: No major trouble  · Visuospatial/Perceptual: No major trouble  · Executive Functioning: More trouble with organization especially around her home, but no other identified trouble.     [Onset/Course]: Ms. Eleser apparently had a neuropsych evaluation in  but we will have access to those findings.  The patient was also not sure about results and recommendations from 10 years ago. Since , she has noted a progressive decline in her cognitive function is specially for processing speed, short-term memory, and organization.  These symptoms are fairly consistent day-to-day but she notices more trouble in the morning and reduced interference from her cognitive symptoms throughout mid day.     Neuropsychiatric Sxs:  · Mood:   · Depression: "About the same and I don't sit around and cry but I " "have this constant blah" consistent with dysthymia. Social activity improves her mood.   · Anxiety: Better and managed on medication. Occasionally, she feels anxious when claustrophic.   · Other:    · Neurovegetative:  · Sleep: Trouble falling asleep (takes her 30-60minutes) and wakes up often through the night for no apparent reason. Doesn't use her CPAP because she doesn't notice any benefit from it. She has tried sleep hygiene without improvement.   · Appetite: Low for years  · Energy: Low for years  · Behavioral Concerns: None  · Delusions/Hallucinations: None  · SI/HI: None    Physical Sxs:  · Motor:  Review Neurology notes  · Sensory:  Review Neurology notes  · Pain:  No major issues    Current Functioning (I/ADLs):  · ADLs:  Independent  · IADLs:  Independent    Family History   Problem Relation Age of Onset    Fibromyalgia Mother     Depression Mother     Heart disease Father     Drug abuse Sister     Depression Sister     Anxiety disorder Sister     Alcohol abuse Brother     Breast cancer Neg Hx     Colon cancer Neg Hx     Ovarian cancer Neg Hx      Family Neurologic History: Vascular Dementia (Maternal Aunt in her 80s)  Family Psychiatric History: See above    Developmental/Academic Hx:  Developmental: No gestational or later developmental concerns.  Academic:  · Learning Difficulties: None  · Attention/Behavioral Difficulties: None  · Educational Attainment: HS (Great Student and 21st in her class) + BBA in Management and Finances    Social/Occupational Hx:  Social:  · Current Relationship/Family Status: Never  + Lives alone  · Primary Source of Support: Some social support, but reduced  · Current Hobbies: Reduced engagement over the past few years  · Stressors: Recent stressors related to family deaths    Occupational Hx:  · Occupational Status: Disabled since 4/30/2014 due to fatigue, pain  · Primary Occupation(s):  · Worked in [employer] for >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    MEDICAL HISTORY  Patient Active Problem List   Diagnosis    Multiple sclerosis    Fibromyalgia    Abnormal MRI    Encounter for long-term (current) use of high-risk medication    Myalgia and myositis    Thoracic or lumbosacral neuritis or radiculitis    Lumbar stenosis    Generalized osteoarthritis    DDD (degenerative disc disease), lumbar    Lumbar facet arthropathy    Arthropathy of shoulder region    Cervical pain    Shoulder pain, left    Shoulder bursitis    Displacement of cervical intervertebral disc without myelopathy    Transient disorder of initiating or maintaining wakefulness    Vitamin D insufficiency    Prophylactic immunotherapy    Counseling regarding goals of care    Neuropathic pain    Dysthymic disorder    Fatigue    Spasm    MDD (major depressive disorder), recurrent episode, mild    LIZZETH (generalized anxiety disorder)    Chronic insomnia    Bereavement    Mild neurocognitive disorder     Past Medical History:   Diagnosis Date    Arthritis     Chronic insomnia 4/14/2016    DDD (degenerative disc disease), lumbar 7/23/2013    Depression     Dysthymic disorder 3/30/2015    Fatigue     Fibromyalgia     LIZZETH (generalized anxiety disorder) 4/14/2016    Hyperlipidemia     Major depressive disorder, recurrent, moderate 4/14/2016    Multiple sclerosis      Past Surgical History:   Procedure Laterality Date    BELT ABDOMINOPLASTY      BREAST REDUCTION      CHOLECYSTECTOMY      HYSTERECTOMY  08/11/2008    [facility] (endometriosis / fibroids) BC    INJECTION-STEROID-EPIDURAL-TRANSFORAMINAL Bilateral 9/5/2013    [facility]    JOINT REPLACEMENT      LAPAROSCOPIC GASTRIC BANDING      Vaginal cuff revision       Neurologic History  · TBI:  None  · Seizures:  None  · Stroke:  None  · Movement Disorder:  None    Lab Results   Component Value Date    [id number] 586 04/08/2014     No results  found for: RPR  Lab Results   Component Value Date    FOLATE 19.2 03/30/2011     Lab Results   Component Value Date    TSH 2.315 04/24/2015     No results found for: LABA1C, HGBA1C  No results found for: HIV1X2, [id number]    Neurodiagnostics    MRI on 05/15/2019  White matter changes greater than typically seen with the patient's age and with appearance consistent with the given history of multiple sclerosis without significant oval change compared to the prior exam of 1/10/2018.  No abnormal enhancement and no diffusion restriction    Electronically signed by: Patricia Felix MD  Date: 05/15/2019        Current Outpatient Medications:     ALPRAZolam (XANAX) 0.5 MG tablet, Take 1 tablet (0.5 mg total) by mouth 2 (two) times daily., Disp: 60 tablet, Rfl: 5    b complex vitamins (VITAMINS B COMPLEX) tablet, Take by mouth. 1 Tablet Oral Every day, Disp: , Rfl:     buPROPion (WELLBUTRIN XL) 300 MG 24 hr tablet, Take 1 tablet (300 mg total) by mouth once daily. Take with food., Disp: 90 tablet, Rfl: 3    cholecalciferol, vitamin D3, (VITAMIN D3) 5,000 unit Tab, Take 5,000 Units by mouth once daily. , Disp: , Rfl:     coenzyme Q10 (CO Q-10) 400 mg Cap, Take by mouth., Disp: , Rfl:     dextroamphetamine-amphetamine (ADDERALL) 20 mg tablet, Take 1 tablet by mouth 2 (two) times daily., Disp: 60 tablet, Rfl: 0    diclofenac sodium (VOLTAREN) 1 % Gel, Apply 2 g topically 4 (four) times daily. (Patient taking differently: Apply 2 g topically continuous prn. ), Disp: 5 Tube, Rfl: 3    DULoxetine (CYMBALTA) 60 MG capsule, Take 2 capsules (120 mg total) by mouth once daily., Disp: 60 capsule, Rfl: 12    gabapentin (NEURONTIN) 600 MG tablet, TAKE 2 TABLETS EVERY MORNING, 1 TABLET AT NOON AND 2 TABLETS AT BEDTIME, Disp: 450 tablet, Rfl: 1    glatiramer (COPAXONE) 40 mg/mL Syrg injection, INJECT ONE SYRINGE (40 MG) SUBCUTANEOUSLY 3 TIMES A WEEK AT LEAST 48 HOURS APART. ALLOW TO WARM TO ROOM TEMP FOR 20 MINUTES. REFRIGERATE.,  "Disp: 36 Syringe, Rfl: 1    ketoconazole (XOLEGEL) 2 % Gel, Apply topically., Disp: , Rfl:     latanoprost 0.005 % ophthalmic solution, PLACE 1 DROP INTO BOTH EYES EVERY EVENING., Disp: 7.5 mL, Rfl: 2    meloxicam (MOBIC) 15 MG tablet, TAKE 1 TABLET BY MOUTH EVERY DAY, Disp: 90 tablet, Rfl: 6    multivitamin (THERAGRAN) per tablet, Take by mouth. 1 Tablet Oral Every day, Disp: , Rfl:     PREVIDENT 5000 SENSITIVE 1.1-5 % Pste, Apply topically once daily. , Disp: , Rfl:     rosuvastatin (CRESTOR) 10 MG tablet, TAKE 1 TABLET (10 MG TOTAL) BY MOUTH EVERY EVENING., Disp: 90 tablet, Rfl: 1    tiZANidine (ZANAFLEX) 2 MG tablet, TAKE 1/2 TABLET BY MOUTH TWICE A DAY THEN 2 TABS AT BEDTIME AS NEEDED, Disp: 270 tablet, Rfl: 1    traMADol (ULTRAM) 50 mg tablet, Take 1-2 tablets ( mg total) by mouth every 6 (six) hours as needed for Pain., Disp: 120 tablet, Rfl: 5    VESICARE 5 mg tablet, TAKE 2 TABLETS BY MOUTH EVERY DAY, Disp: 180 tablet, Rfl: 1    zaleplon (SONATA) 10 MG capsule, Take 1 capsule (10 mg total) by mouth every evening., Disp: 30 capsule, Rfl: 5    Psychiatric Hx:  · Childhood: Review prior psychiatric notes  · Adult: Sees Dr. Tyler regularly for dysthymia and anxiety with stability of sxs  · Substance Use: None    Social History     Tobacco Use    Smoking status: Never Smoker    Smokeless tobacco: Never Used   Substance and Sexual Activity    Alcohol use: Yes     Comment: rare    Drug use: No    Sexual activity: Not on file       MENTAL STATUS AND OBSERVATIONS:  APPEARANCE: Casually dressed and adequate grooming/hygiene.   ALERTNESS/ORIENTATION: Attentive and alert. Fully oriented (x5) to time and place  GAIT: Unremarkable  MOTOR MOVEMENTS/MANNERISMS: Unremarkable  SPEECH/LANGUAGE: Normal in rate, rhythm, tone, and volume. No significant word finding difficulty noted. Expressive and receptive language was normal.  STATED MOOD/AFFECT: The patients stated mood was "blah." Affect was congruent " with stated mood.   INTERPERSONAL BEHAVIOR: Rapport was quickly and easily established   SUICIDALITY/HOMICIDALITY: Denied  HALLUCINATIONS/DELUSIONS: None evidenced or endorsed  THOUGHT PROCESSES: Thoughts seemed logical and goal-directed.   TEST TAKING BEHAVIOR and VALIDITY: Freestanding and embedded performance validity measures and observation of effort were largely suggestive of adequate engagement. The current results, therefore, are likely a valid reflection of the patient's current functioning.     PROCEDURES/TESTS ADMINISTERED:  In addition to performing a review of pertinent medical records, reviewing limits to confidentiality, conducting a clinical interview, and explaining procedures, the following measures were administered: [employer] (ACS) Test of Pre-Morbid Functioning (TOPF), Green's MSVT, Wechsler Adult Intelligence Scale-Fourth Edition (WAIS-IV Digit Span, Arithmetic, Matrix Reasoning, and Similarities), SDMT; Trail Making Test (TMT-A&B; Delmi, et al., 2004), Verbal Fluency Test(FAS/Animals; Delmi et al., 2004), NAB Naming Test, Marc Complex Figure Copy Trial(Marc CFT), [place] Verbal Learning Test-Second Edition (CVLT-2), Wechsler Memory Scale-Fourth Edition (WMS-IV) Logical Memory and Visual Reproduction subtests, [place] Card Sorting Test (WCST-128), Grooved Pegboard (GPT; Delmi, et al., 2004), BDI-2, LIZZETH-7 Manual norms were used unless otherwise indicated.  Review data Appendix Below for scores and percentiles.     TEST RESULTS  Pre-Morbid Functioning:  Average to near above average range consistent with educational level    Mental Status:  Normal, see above    Attention/Working Memory:  Basic attention and basic working memory were within normal limits but likely mildly reduced given premorbid functioning.  More complex attention and working memory were below average and reduced given her premorbid functioning.    Processing or Mental Speed:  Basic mental speed was  normal range.  More complex processing speed that also involves working memory was below average to borderline range.    Motor Functions:  Bilaterally impaired for speed and dexterity    Language:  Basic expressive and receptive language are normal on observation.  Complex verbal reasoning was normal range.  Semantic fluency was normal.  Phonemic fluency was below average.  Object naming was normal.    Visuospatial/Construction:  Normal    Learning and Memory:  Visual learning and memory were above average to superior.  Verbal information placed greater demands on executive function skills, her learning was at the low end of average but still normal. Analysis of her learning curve showed that she benefitted from repetition especially at the 2nd learning attempt.  Her delayed recall was borderline range but improved substantially when she was giving cues.  With verbal learning placed reduced demands on executive function skills, her learning and memory were largely normal.    Executive or Frontal-lobe Functions:  Conceptual reasoning and problem solving with corrective feedback was normal verbal and nonverbal reasoning was normal phonemic fluency and set shifting were below average, but both measures have significant speed components to them.    Psychiatric or Behavioral Symptoms: Longstanding dysthymia and generalized anxiety. She reports stable sxs, but self-report measures were quite elevated. Will review during feedback with her    OVERALL SUMMARY  Problem List Items Addressed This Visit        Neuro    Multiple sclerosis    Mild neurocognitive disorder - Primary    Overview     See 6/18/19 neuropsych eval         Current Assessment & Plan     -Neuropsychological testing only showed notable inefficiencies for processing speed and aspects of complex attention (sustaining attention, shifting her attention quickly, processing multiple instructions at the same time).  This is common in individuals with MS.  It is  likely that her slowed processing speed and trouble with complex attention is having negative downstream effects for her memory in everyday life.  -Aside from slowed mental speed and trouble with complex attention, her cognitive scores were normal or better in a controlled lab-based testing environment (including memory scores).   -See below for recommendations to help her with compensatory strategies and long term brain health            Psychiatric    Dysthymic disorder    Current Assessment & Plan     -longstanding but she is reporting more depressive symptoms than would be expected in typical individuals with dysthymia on the Morales depression inventory  -will discuss with her during feedback         LIZZETH (generalized anxiety disorder)    Current Assessment & Plan     -symptoms were reportedly stable on interview but she reported quite a bit of symptoms on the self reports screening measure (LIZZETH-7=10)  -will discuss with patient during feedback  -               KAYLEY Butt II, Ph.D., [facility]-  Board Certified Clinical Neuropsychologist  Co-Director, Cognitive Disorders and Brain Health Program  Department of Neurology and Neurosciences  [facility]    RECOMMENDATIONS/TREATMENT PLAN  Follow Up Recommendations:  · Neurology Follow-up: Continued Neurology follow-up as recommended by Ms. Elesers neurologist.  · Mental Health Follow-up: Will discuss with patient  · Neuropsychology: Neuropsychological reevaluation is not recommended at a specified interval.  If Ms. Eleser or others notice reductions in functioning, repeat evaluation is recommended at that time.    Recommendations for Ms. Eleser and Caregivers/Family:   · Brain Health: Will discuss vascular health and brain health. This includes recommendations for physical activity, healthy diet (e.g., Mediterranean Style Diet), social activity, and mental activity.   · Attention: Remember that inattention and lack of focus are major culprits to forgetting  information so be sure and practice paying attention for adequate learning of information. If you rely on passive attention to remembering something (e.g., yeah, uh-huh approach), youll find you cannot recall it later. I recommend the following to improve attention, which may aid in later recall:   · Reduce distractions in the area as much as possible.  · Look at the person as they are speaking to you.   · Paraphrase as they are speaking  · Write down important pieces of information   · Ask them to repeat if you zone out.   · Have them simplify and reduce information that you need to attend to during conversation.   · Have visual cues to remind you if you need to do something later.  · Processing Speed:   · Using multiple modalities (e.g., listening, writing notes, asking questions, recording) to learn new information is likely to allow additional time for processing, thus improving memory for the material.   · Allowing sufficient time to complete tasks will reduce frustration and help to ensure completion.  · Storing Information: Use the below strategies to help you further enhance how information is stored.  · Rehearse - Immediately after seeing/hearing something, try to recall it.  Wait a few minutes, then check again.  Gradually lengthen the intervals between rehearsals.  · Repetition of learned material is critical to ensure storage of information to be learned. Self-test at home to ensure learning.  · Write down important information to improve your attention and focus and to have something to look back on when you need to recall it.  · Make sure the person doesnt rattle off, but presents in a clear, logical, and unhurried manner.   · Recalling Information:  · Jog your memory - Lose something?  Think back to when you last had it.  What did you do next?  And after that?  Mentally walk yourself through each activity that followed.  Prodding your memory this way may enable you to recall the location of the  missing item.  · Use a cue - Symbolic reminders (the proverbial string around the finger) are helpful.  So too are memos, timers, calendar notes, etc.--keep them in visible, appropriate places.  · Get organized - Have fixed locations for all important papers, key phone numbers, medications, keys, wallet, glasses, tools, etc.  · Develop routines - Routines can anchor memories so they do not drift away.    · Sleep Hygiene: Poor sleep has a negative effect on cognition. Several strategies have been shown to improve sleep:   · Caffeine intake in the afternoon and evening, as well as stuffing oneself at supper, can decrease the quality of restful sleep throughout the night.   · Bedtime and wake-up times should be consistent every night and morning so the body becomes used to a single routine, even on the weekends.  · Engage in daily physical activity, but not 2-3 hours before bedtime.   · No technology use (television, computer, iPad) 1-2 hours before bed.   · Have a wind down routine (e.g., soft lights in the house, bath before bed, reduced fluid intake, songs, reading, less noise) to promote sleep readiness.   · Visit the www.sleepfoundation.org for more strategies.   · Practice good cognitive hygiene:  · Engage in regular exercise, which increases alertness and arousal and can improve attention and focus.  Consider lower impact exercises, such as yoga or light walking.  · Get a good nights sleep, as this can enhance alertness and cognition.  · Eat healthy foods and balanced meals. It is notable that research indicates certain nutrients may aid in brain function, such as B vitamins (especially B6, B12, and folic acid), antioxidants (such as vitamins C and E, and beta carotene), and Omega-3 fatty acids. Talk with your physician or nutritionist about whats right for you.   · Keep your brain active. Find activities to stay mentally active, such as reading, games (cards, checkers), puzzles (crosswords, Sudoku, jig  saw), crafts ([employer], woodworking), gardening, or participating in activities in the community.  · Stay socially engaged. Continue staying active with your family and friends.    BILLING  Service Description CPT Code Minutes Units   Psychiatric diagnostic evaluation by physician [id number] [phone]   Neurobehavioral status exam by physician [id number]  0   Each additional hour by physician [id number]  0   Test Evaluation Services --  --   Neuropsychological testing evaluation services by physician [id number]   Each additional hour by physician [id number] [phone]   Test Administration and Scoring --  --   Psychological or neuropsychological test administration and scoring by physician [id number]  0   Each additional 30 minutes by physician [id number]  0   Psychological or neuropsychological test administration and scoring by technician [id number] [phone]   Each additional 30 minutes by technician [phone]     DATA APPENDIX  PERFORMANCE VALIDITY  GMSVT  IR..................................90 / Valid  DR..................................85 / Invalid  CS..................................85 / Invalid  PA.................................100 /   FR..................................85 /     RDS...................................8 / Valid  CVLT-FC..............................16 / Valid      Percentile Interpretation:        </=3rd......................................Abnormal        4th-9th.....................................Borderline Abnormal        10th-24th...................................Low Average        25th-74th...................................Average        75th-90th...................................High Average        91st-97th...................................Superior        >/=97th.....................................Very Superior           ESTIMATED FSIQ and READING LEVEL:      ACS-TOPF (Raw/SS/%ile)...............51 / 108 / 70        AUDITORY ATTENTION AND WORKING MEMORY    [id number] Span        Forward raw..........................8 /  25th      Forward span.........................5 /       Backward raw.........................7 / 25th      Backward span........................4 /       Sequencing raw.......................7 / 37th      Sequencing span......................5 /       Overall (SS/percentile)..............8 / 25th                [id number]        Total Raw............................9 / 9th  WAIS-Working Memory Index (SS/%ile)......83 / 13th      CVLT-2-Trial-1 .............................4    CVLT-2-Trial-5 ............................12            MOTOR AND ORAL PROCESSING SPEED     Trail Making Test (sec. to completion/percentile):        Part A .....................................30 / 34th          Errors..................................0 /             SDMT (total correct/percentile):        Oral Form...................................46 / 17th      Written Form................................36 / 8th        MOTOR FUNCTIONS    Grooved Pegboard (sec. to completion/%ile)        Dominant (Right) Hand.......................104 / 1st      Non-dominant (Left) Hand....................123 / 1st      LANGUAGE FUNCTIONING    WORD PRODUCTIVITY    Verbal Fluency Tests (raw/percentiles):        FAS.........................................33 / 12th      Animals.....................................23 / 66th      CONFRONTATION NAMING    NAB Naming Test (raw/percentile)        Total Spontaneous (max. = 31)...............31/ 62nd      VERBAL REASONING    WAIS-IV (scaled score/percentile):        Similarities................................11 / 63rd      VISUOPERCEPTUAL/SPATIAL REASONING    WAIS-IV Matrix Reasoning (SS/%ile)...............11 / 63rd      CONSTRUCTIONAL PRAXIS     Marc Complex Figure (raw score/percentile):        Copy (max. = 36).............................36 / Normal    NONVERBAL LEARNING AND MEMORY    WMS-IV Visual Reproduction (SS/%ile)        VR-I.........................................12 / 75th       VR-II........................................14 / 91st      VR-Recognition...............................6 / 51-75th      VR-Copy......................................43 / >75th      VERBAL LEARNING AND MEMORY OF A WORDLIST WITH INTERFERENCE    CVLT-2 (raw score/percentile):        Total Recall (4, 9, 9, 10, 12)..............44 / 27th      Short Delay Free Recall......................9 / 31st      Short Delay Cued Recall......................11 / 31st      Long Delay Free Recall ......................8 / 7th      Long Delay Cued Recall.......................12 / 50th      Recall from Primacy..........................25% / 31st      Recall from Middle...........................48% / 69th      Recall from Recency..........................27% / 50th      Recognition:             Hits.....................................13 / 16th          False-Positives..........................4 / 84th          Total Recognition Discriminability.......2 / 7th      VERBAL LEARNING AND MEMORY OF PROSE PASSAGES    WMS-IV (raw score/percentile):        Logical Memory I.............................23 / 37th      Logical Memory II............................21/ 50th      Recognition..................................22 / 17-25th        EXECUTIVE FUNCTIONING    WCST-128 (raw/%ile):        Categories Completed.........................6 / >16th      Total Errors.................................13 / 54th      Perseverative Errors.........................7 / 50th      Perseverative Responses......................7 / 54th      Trials to 1st................................11 / >16th      Failure to Maintain Set......................0 / >16th          [place] Making Test (sec. to completion/%ile):        Part B ......................................87 / 21st          Errors...................................0 /       SELF-REPORTED MOOD  BDI-2...........................................30 / Moderate to  Severe  LIZZETH-7...........................................10 / Mild to Moderate

## 2019-06-18 PROBLEM — G31.84 MILD NEUROCOGNITIVE DISORDER: Status: ACTIVE | Noted: 2019-06-18

## 2019-06-18 NOTE — ASSESSMENT & PLAN NOTE
-Neuropsychological testing only showed notable inefficiencies for processing speed and aspects of complex attention (sustaining attention, shifting her attention quickly, processing multiple instructions at the same time).  This is common in individuals with MS.  It is likely that her slowed processing speed and trouble with complex attention is having negative downstream effects for her memory in everyday life.  -Aside from slowed mental speed and trouble with complex attention, her cognitive scores were normal or better in a controlled lab-based testing environment (including memory scores).   -See below for recommendations to help her with compensatory strategies and long term brain health

## 2019-06-18 NOTE — ASSESSMENT & PLAN NOTE
-longstanding but she is reporting more depressive symptoms than would be expected in typical individuals with dysthymia on the Morales depression inventory  -will discuss with her during feedback

## 2019-06-18 NOTE — ASSESSMENT & PLAN NOTE
-symptoms were reportedly stable on interview but she reported quite a bit of symptoms on the self reports screening measure (LIZZETH-7=10)  -will discuss with patient during feedback  -

## 2019-06-20 ENCOUNTER — OFFICE VISIT (OUTPATIENT)
Dept: NEUROLOGY | Facility: CLINIC | Age: 59
End: 2019-06-20
Payer: COMMERCIAL

## 2019-06-20 ENCOUNTER — PATIENT MESSAGE (OUTPATIENT)
Dept: INTERNAL MEDICINE | Facility: CLINIC | Age: 59
End: 2019-06-20

## 2019-06-20 ENCOUNTER — OFFICE VISIT (OUTPATIENT)
Dept: PSYCHIATRY | Facility: CLINIC | Age: 59
End: 2019-06-20
Payer: COMMERCIAL

## 2019-06-20 VITALS
BODY MASS INDEX: 30.75 KG/M2 | SYSTOLIC BLOOD PRESSURE: 165 MMHG | DIASTOLIC BLOOD PRESSURE: 88 MMHG | HEART RATE: 69 BPM | HEIGHT: 60 IN | WEIGHT: 156.63 LBS

## 2019-06-20 DIAGNOSIS — F41.1 GAD (GENERALIZED ANXIETY DISORDER): ICD-10-CM

## 2019-06-20 DIAGNOSIS — F34.1 DYSTHYMIC DISORDER: ICD-10-CM

## 2019-06-20 DIAGNOSIS — G35 MULTIPLE SCLEROSIS: ICD-10-CM

## 2019-06-20 DIAGNOSIS — R53.82 CHRONIC FATIGUE: ICD-10-CM

## 2019-06-20 DIAGNOSIS — F41.1 GENERALIZED ANXIETY DISORDER: ICD-10-CM

## 2019-06-20 DIAGNOSIS — F33.0 MDD (MAJOR DEPRESSIVE DISORDER), RECURRENT EPISODE, MILD: Primary | ICD-10-CM

## 2019-06-20 DIAGNOSIS — F51.04 CHRONIC INSOMNIA: ICD-10-CM

## 2019-06-20 DIAGNOSIS — F34.1 DYSTHYMIC DISORDER: Primary | ICD-10-CM

## 2019-06-20 PROCEDURE — 99499 NO LOS: ICD-10-PCS | Mod: S$GLB,,, | Performed by: CLINICAL NEUROPSYCHOLOGIST

## 2019-06-20 PROCEDURE — 90833 PSYTX W PT W E/M 30 MIN: CPT | Mod: S$GLB,,, | Performed by: PSYCHIATRY & NEUROLOGY

## 2019-06-20 PROCEDURE — 90833 PR PSYCHOTHERAPY W/PATIENT W/E&M, 30 MIN (ADD ON): ICD-10-PCS | Mod: S$GLB,,, | Performed by: PSYCHIATRY & NEUROLOGY

## 2019-06-20 PROCEDURE — 99213 PR OFFICE/OUTPT VISIT, EST, LEVL III, 20-29 MIN: ICD-10-PCS | Mod: S$GLB,,, | Performed by: PSYCHIATRY & NEUROLOGY

## 2019-06-20 PROCEDURE — 99499 UNLISTED E&M SERVICE: CPT | Mod: S$GLB,,, | Performed by: CLINICAL NEUROPSYCHOLOGIST

## 2019-06-20 PROCEDURE — 99999 PR PBB SHADOW E&M-EST. PATIENT-LVL III: ICD-10-PCS | Mod: PBBFAC,,, | Performed by: PSYCHIATRY & NEUROLOGY

## 2019-06-20 PROCEDURE — 3008F BODY MASS INDEX DOCD: CPT | Mod: CPTII,S$GLB,, | Performed by: PSYCHIATRY & NEUROLOGY

## 2019-06-20 PROCEDURE — 99213 OFFICE O/P EST LOW 20 MIN: CPT | Mod: S$GLB,,, | Performed by: PSYCHIATRY & NEUROLOGY

## 2019-06-20 PROCEDURE — 3008F PR BODY MASS INDEX (BMI) DOCUMENTED: ICD-10-PCS | Mod: CPTII,S$GLB,, | Performed by: PSYCHIATRY & NEUROLOGY

## 2019-06-20 PROCEDURE — 99999 PR PBB SHADOW E&M-EST. PATIENT-LVL III: CPT | Mod: PBBFAC,,, | Performed by: PSYCHIATRY & NEUROLOGY

## 2019-06-20 RX ORDER — TRAMADOL HYDROCHLORIDE 50 MG/1
50 TABLET ORAL 4 TIMES DAILY
Refills: 1 | COMMUNITY
Start: 2019-06-02

## 2019-06-20 RX ORDER — HYDROCODONE BITARTRATE AND ACETAMINOPHEN 5; 325 MG/1; MG/1
1 TABLET ORAL 3 TIMES DAILY PRN
Refills: 0 | COMMUNITY
Start: 2019-05-31 | End: 2022-11-29

## 2019-06-20 RX ORDER — DEXTROAMPHETAMINE SACCHARATE, AMPHETAMINE ASPARTATE, DEXTROAMPHETAMINE SULFATE AND AMPHETAMINE SULFATE 5; 5; 5; 5 MG/1; MG/1; MG/1; MG/1
1 TABLET ORAL 2 TIMES DAILY
Qty: 60 TABLET | Refills: 0 | Status: SHIPPED | OUTPATIENT
Start: 2019-06-20 | End: 2019-10-16 | Stop reason: SDUPTHER

## 2019-06-20 RX ORDER — DEXTROAMPHETAMINE SACCHARATE, AMPHETAMINE ASPARTATE, DEXTROAMPHETAMINE SULFATE AND AMPHETAMINE SULFATE 5; 5; 5; 5 MG/1; MG/1; MG/1; MG/1
1 TABLET ORAL 2 TIMES DAILY
Qty: 60 TABLET | Refills: 0 | Status: SHIPPED | OUTPATIENT
Start: 2019-08-19 | End: 2019-10-16 | Stop reason: SDUPTHER

## 2019-06-20 RX ORDER — DEXTROAMPHETAMINE SACCHARATE, AMPHETAMINE ASPARTATE, DEXTROAMPHETAMINE SULFATE AND AMPHETAMINE SULFATE 5; 5; 5; 5 MG/1; MG/1; MG/1; MG/1
1 TABLET ORAL 2 TIMES DAILY
Qty: 60 TABLET | Refills: 0 | Status: SHIPPED | OUTPATIENT
Start: 2019-07-20 | End: 2019-10-16 | Stop reason: SDUPTHER

## 2019-06-20 RX ORDER — ALPRAZOLAM 0.5 MG/1
0.5 TABLET ORAL 2 TIMES DAILY
Qty: 60 TABLET | Refills: 5 | Status: SHIPPED | OUTPATIENT
Start: 2019-06-20 | End: 2019-10-16 | Stop reason: SDUPTHER

## 2019-06-20 NOTE — PROGRESS NOTES
"Outpatient Psychiatry Follow-Up Visit (MD/NP)  06/20/2019    Session Length:  30 minutes (E&M plus psychotherapy)     Clinical Status of Patient: Outpatient (Ambulatory)    Chief Complaint: Karen D Eleser is a 58 y.o. female who presents today for follow-up of chronic depression and anxiety.    Met with patient.     Interval History and Content of Current Session:  Interim Events/Subjective Report/Content of Current Session:  First f/u appt with me since 2/21/2019.  Pt had seen Dr. Tash Clark in the past; last appointment was on 9/28/2015 (this note was reviewed).    Plan from last session:  "Continue Cymbalta 120 mg daily  Continue Wellbutrin  mg total daily.  Pt has denied Hx of seizures.     Continue Xanax 0.5 mg BID prn anxiety; can also use for sleep.    Pt can take OTC Benadryl or melatonin for sleep.   Also, continue Adderall immediate release 20 mg for treatment of fatigue/focus/concentration and treatment resistant depression.  I have recommended she take no > 60 mg max per day, preferably less.  Three Rx for Adderall 20 mg, each for a 30 day supply with no refills & with "Do not fill until" dates posted accordingly, were given to patient."    She states she has been "OK".    Pt states she had neuropsychological testing.  She had the test last week and met with Dr. Butt for interpretation.  It did not show anything significant besides some slowing of processing speed likely due to the MS.       She just got back from her trip on May 11 in [place].  She looks forward to travelling to [place] and [place] in August.      Current SIGECAPS:    Sleep -- often has problems falling asleep; often must take sleep med to be able to fall asleep or go back to sleep.   Interests -- low in general ("I don't have anybody to do anything with" -- very few friends; had done a lot of things with her sister)    Guilt -- for "enabling my sister for so many years", not showing tough love.  Similar feelings with her " "mom (had to say "no" to her; feelings regarding circumstances surrounding her death).    Energy -- diminished    Concentration -- poor due to MS; has a hard time reading (Adderall helps)   Appetite -- "OK'   Psychomotor --  Somewhat decreased   Suicidal ideation -- denies   She does not feel optimistic about the future.      She has only been taking Benadryl 50 mg to sleep.  She will sometimes also use a Xanax tablet to help with sleep initiation.    She had taken Xanax or Ambien.  She had to stop taking Ambien because it was causing visual hallucinations and illusions at night (i.e., TV cord looks like a snake), as well as sleepwalking and sleepeating behaviors.   She also had taken tizanidine.  Adderall actually makes her insomnia worse.    I have also stressed sleep hygiene therapy.    Also, she had tried the Sonata -- it did help her fall back asleep.  However, her insurance did not cover cost of this med.      She has NOT been taking the Adderall everyday, just when needed.      She has stated she enjoys reading and watching certain TV shows, particularly historical based fiction.    She has a harder time reading since she was Dx with MS, as it has affected her ability to concentrate.    She states she has joined on line groups for various interests.     She enjoys doing things outdoors in cool weather.  She likes to travel.       Her main physical complaint is she has chronic painful neuropathy in her feet, legs due to lumbar disc herniation.    She has stated she has had problems with verbal expression of words that she wants to say (she is on gabapentin, which could have an effect on cognition).      Her father had endocarditis when he was younger.  He then had to have a pacemaker, then dual pacemaker/defibrillator.  He also had 3 sisters (her aunts) who all had heart disease (I did not asked if they smoked).  He had 3 older brothers who have been healthy.  She denies heart disease on her mother's side.  " "  Her sister  from a massive MI at age 57 yo.  However, she abused pills and OTC meds and had gone to rehab at [facility] just months prior to her death.  She notes she and her sister were very close; they even lived together for a while.    Pt has denied ever having problems with her heart.  She stated she never had a stress test, but she has had "plenty of" EKG's.     She had been treated by Dr. Royce Pina for MS until his death in .  She has also been treated by other neurologists.  Dr. Maia Minaya is her neurologist currently.      Past psych meds: celexa, lexapro, and effexor in the past.    From previous sessions:  Regarding her mom and sister:  Mother passed: 2015. Sister passed: 2014. Sister had problems with drugs and alcohol, was a nurse and lost her nursing license.  [facility]  She was close to them both.  She had enjoyed going to the movies, going out to eat, travel, etc.  However, she had always done these things with her sister, who  in 2014.  She has no one in her life that she is close to compared to her sister in the past.  She has a brother who is 6 years younger -- "he can be very abrasive, he talks real loud".  "He also has his life, his children".  She also notes he does not understand or appreciate her mental illness.   --Regarding past intimate relationships: She has had just one very serious relationship that lasted for 10 years.  She states the relationship ended because he lied to her & told her that he worked and stayed at his mother's house, caring for her.  However, he did not work, lived off his mother's SS, then was kicked out of the mom's trailer by his siblings after she , so he was homeless for a while.  She thinks he may be living with his ex-wife now.  She states this happened about 5 years ago.    Since then, she had met 2 guys on line ([employer]) -- went out with each of them a " few times, but nothing ever came of this.    She notes her mother was very controlling.  Pt also was shy growing up.  Pt denies ever being sexually molested.  She denies ever having problems with alcohol or drugs.  She is out on disability from work ([employer]) as a .    She is still getting a check through her work disability co.     PSYCHOTHERAPY ADD-ON +[id number]   30 (16-37*) minutes   · Target symptoms: depression, anxiety, fatigue  · Why chosen therapy is appropriate versus another modality: relevant to diagnosis, patient responds to this modality  · Outcome monitoring methods: self-report  · Therapeutic intervention type: supportive psychotherapy  · Topics discussed/themes: SEE ABOVE-- illness/death of loved ones, stress related to medical comorbidities, life stage transitional issues, social isolation, self care/nurturing.  · The patient's response to the intervention is accepting.   · The patient's progress toward treatment goals is fair.  · Duration of intervention: 18 minutes      Review of Systems   · PSYCHIATRIC: Pertinant items are noted in the narrative.  · CONSTITUTIONAL: Some recent wt fluctuations.  Chronic fatigue.     · MUSCULOSKELETAL: + chronic pain in her feet/legs  · NEUROLOGIC: Some occasional mild numbness and tingling in her arms.  No weakness, seizures, confusion, memory loss, tremor or other abnormal movements.  · ENDOCRINE: No polydipsia or polyuria.  · INTEGUMENTARY: No rashes or lacerations.  · EYES: No exophthalmos, jaundice or blindness.  · ENT: No dizziness, tinnitus or hearing loss.  · RESPIRATORY: No shortness of breath.  · CARDIOVASCULAR: No tachycardia or chest pain.  · GASTROINTESTINAL: No nausea, vomiting, pain, constipation or diarrhea.  · GENITOURINARY: No frequency, dysuria.        Past Medical, Family and Social History: The patient's past medical, family and social history have been reviewed and updated as appropriate within the electronic medical record  -- see encounter notes.       Current Outpatient Medications:     ALPRAZolam (XANAX) 0.5 MG tablet, Take 1 tablet (0.5 mg total) by mouth 2 (two) times daily., Disp: 60 tablet, Rfl: 5    b complex vitamins (VITAMINS B COMPLEX) tablet, Take by mouth. 1 Tablet Oral Every day, Disp: , Rfl:     buPROPion (WELLBUTRIN XL) 300 MG 24 hr tablet, Take 1 tablet (300 mg total) by mouth once daily. Take with food., Disp: 90 tablet, Rfl: 3    cholecalciferol, vitamin D3, (VITAMIN D3) 5,000 unit Tab, Take 5,000 Units by mouth once daily. , Disp: , Rfl:     coenzyme Q10 (CO Q-10) 400 mg Cap, Take by mouth., Disp: , Rfl:     dextroamphetamine-amphetamine (ADDERALL) 20 mg tablet, Take 1 tablet by mouth 2 (two) times daily., Disp: 60 tablet, Rfl: 0    diclofenac sodium (VOLTAREN) 1 % Gel, Apply 2 g topically 4 (four) times daily. (Patient taking differently: Apply 2 g topically continuous prn. ), Disp: 5 Tube, Rfl: 3    DULoxetine (CYMBALTA) 60 MG capsule, Take 2 capsules (120 mg total) by mouth once daily., Disp: 60 capsule, Rfl: 12    gabapentin (NEURONTIN) 600 MG tablet, TAKE 2 TABLETS EVERY MORNING, 1 TABLET AT NOON AND 2 TABLETS AT BEDTIME, Disp: 450 tablet, Rfl: 1    glatiramer (COPAXONE) 40 mg/mL Syrg injection, INJECT ONE SYRINGE (40 MG) SUBCUTANEOUSLY 3 TIMES A WEEK AT LEAST 48 HOURS APART. ALLOW TO WARM TO ROOM TEMP FOR 20 MINUTES. REFRIGERATE., Disp: 36 Syringe, Rfl: 1    ketoconazole (XOLEGEL) 2 % Gel, Apply topically., Disp: , Rfl:     latanoprost 0.005 % ophthalmic solution, PLACE 1 DROP INTO BOTH EYES EVERY EVENING., Disp: 7.5 mL, Rfl: 2    meloxicam (MOBIC) 15 MG tablet, TAKE 1 TABLET BY MOUTH EVERY DAY, Disp: 90 tablet, Rfl: 6    multivitamin (THERAGRAN) per tablet, Take by mouth. 1 Tablet Oral Every day, Disp: , Rfl:     PREVIDENT 5000 SENSITIVE 1.1-5 % Pste, Apply topically once daily. , Disp: , Rfl:     rosuvastatin (CRESTOR) 10 MG tablet, TAKE 1 TABLET (10 MG TOTAL) BY MOUTH EVERY EVENING., Disp:  "90 tablet, Rfl: 1    tiZANidine (ZANAFLEX) 2 MG tablet, TAKE 1/2 TABLET BY MOUTH TWICE A DAY THEN 2 TABS AT BEDTIME AS NEEDED, Disp: 270 tablet, Rfl: 1    traMADol (ULTRAM) 50 mg tablet, Take 1-2 tablets ( mg total) by mouth every 6 (six) hours as needed for Pain., Disp: 120 tablet, Rfl: 5    VESICARE 5 mg tablet, TAKE 2 TABLETS BY MOUTH EVERY DAY, Disp: 180 tablet, Rfl: 1    zaleplon (SONATA) 10 MG capsule, Take 1 capsule (10 mg total) by mouth every evening., Disp: 30 capsule, Rfl: 5    She has not been taking zaleplon (only Benadryl for sleep) -- see above.    She generally takes the Xanax at night, minimal during the day.      Compliance: yes    Side effects: Ambien -- visual hallucinations and illusions, sleepwalking/sleepeating.     Risk Parameters:  Patient reports no suicidal ideation  Patient reports no homicidal ideation  Patient reports no self-injurious behavior  Patient reports no violent behavior    Exam (detailed: at least 9 elements; comprehensive: all 15 elements)    Constitutional  Vitals:  Most recent vital signs, dated less than 90 days prior to this appointment, were reviewed:     Vitals - 1 value per visit 2/22/2019 3/15/2019 6/20/2019   SYSTOLIC 135 139 165   DIASTOLIC 86 88 88   PULSE 83 96 69   SPO2      Weight (lb) 154  156.64   Weight (kg) 69.854  71.05   HEIGHT 5' 0" 5' 0" 5' 0"   BODY MASS INDEX 30.08 30.08 30.59   VISIT REPORT      Pain Score  5 7        Vitals - 1 value per visit 3/5/2018 3/13/2018 5/7/2018 2/21/2019   SYSTOLIC 138  154 139   DIASTOLIC 80  81 80   PULSE 79  95 83   RESPIRATIONS       SPO2 98      Weight (lb) 161.38  158.95 154.76   Weight (kg) 73.2  72.1 70.2   HEIGHT 5' 0"  5' 0" 5' 0"   BODY MASS INDEX 31.52  31.04 30.23   VISIT REPORT       Pain Score  0 0         Vitals - 1 value per visit 8/10/2017 11/2/2017 12/28/2017 2/1/2018   SYSTOLIC 138 144 120 145   DIASTOLIC 84 73 75 78   PULSE 95 104 91 88   RESPIRATIONS       SPO2       Weight (lb) 152 150 152 " "162.2   Weight (kg) 68.947 68.04 68.947 73.573   HEIGHT 5' 0" 5' 0"  5' 0"   BODY MASS INDEX 29.69 29.29 29.69 31.68   VISIT REPORT       Pain Score    6         General:  unremarkable, age appropriate, well dressed, neatly groomed. Not wearing make-up today (normal for her with Mu-ism/charismatic elin)     Musculoskeletal  Muscle Strength/Tone:  no tremor, no tic    Gait & Station:  non-ataxic      Psychiatric  Speech:  normal tone, normal rate, normal pitch, normal volume, spontaneous    Mood & Affect:  "OK"  congruent and appropriate to situation/content. Appropriately tearful when discussing aspects of grief.    Thought Process:  normal and logical, goal-directed    Associations:  intact    Thought Content:  normal, no suicidality, no homicidality, delusions, or paranoia    Insight & Judgement:  good, intact  adequate to circumstances, age appropriate, good    Orientation:  grossly intact, person, place, time/date, situation    Memory:  intact for content of interview, grossly intact    Language:  grossly intact    Attention Span & Concentration:  able to focus    Fund of Knowledge:  intact and appropriate to age and level of education, familiar with aspects of current personal life      Prior EKG:  Test Reason : Z79.899  Vent. Rate : 077 BPM     Atrial Rate : 077 BPM     P-R Int : 136 ms          QRS Dur : 090 ms      QT Int : 406 ms       P-R-T Axes : 033 000 073 degrees     QTc Int : 459 ms  Normal sinus rhythm  Poor R wave progression  Abnormal ECG  When compared with ECG of 21-JUL-2008 08:45,  Nonspecific T wave abnormality no longer evident in Inferior leads  QT has lengthened  Confirmed by Marty VEE, Yaneli ([id number]) on 8/11/2017 4:36:19 PM      Assessment and Diagnosis    Status/Progress: Based on the examination today, the patient's problem(s) is/are adequately, but not ideally controlled. New problems have not been presented today. Comorbidities (chronic neuropathic pain) are complicating " "management of the primary condition. There are no active rule-out diagnoses for this patient at this time.    General Impression:   Major Depressive Disorder, Recurrent: Mild  Dysthymic Disorder   Generalized Anxiety Disorder  Chronic Insomnia   Multiple Sclerosis (causes cognitive difficulties)   Also: chronic pain, CHELSEA    Intervention/Counseling/Treatment Plan    Continue all current meds:  Continue Cymbalta 120 mg daily  Continue Wellbutrin  mg total daily.  Pt has denied Hx of seizures.     Continue Xanax 0.5 mg BID prn anxiety; can also use for sleep.    Pt can take OTC Benadryl or melatonin for sleep.   Also, continue Adderall immediate release 20 mg for treatment of fatigue/focus/concentration and treatment resistant depression.  I have recommended she take no > 40 mg max per day, preferably less when not needed.  Three Rx for Adderall 20 mg, each for a 30 day supply with no refills & with "Do not fill until" dates posted accordingly, were given to patient.   Encourage individual psychotherapy for support. Patient had been meeting with LCSW in Neurology clinic; she can continue this (if insurance allows) or consider f/u with Dr. Nery Toledo or other therapist in our clinic.        Additional Notes:  N/A      Return to Clinic: 3 - 4 months, or sooner prn.  "

## 2019-06-20 NOTE — PROGRESS NOTES
Neuropsychology Feedback Note    Date of Session: 06/20/2019  Session Content: Results and recommendations were discussed for 38-minutes. Review Neuropsychology Consult dated 6/14/19 for details. No further neuropsychology feedback needed.

## 2019-07-22 RX ORDER — DICLOFENAC SODIUM 10 MG/G
2 GEL TOPICAL 4 TIMES DAILY
Qty: 5 TUBE | Refills: 0 | Status: SHIPPED | OUTPATIENT
Start: 2019-07-22

## 2019-07-23 ENCOUNTER — OFFICE VISIT (OUTPATIENT)
Dept: OPTOMETRY | Facility: CLINIC | Age: 59
End: 2019-07-23
Payer: COMMERCIAL

## 2019-07-23 DIAGNOSIS — H52.4 PRESBYOPIA OF BOTH EYES: ICD-10-CM

## 2019-07-23 DIAGNOSIS — H40.053 OCULAR HYPERTENSION, BILATERAL: Primary | ICD-10-CM

## 2019-07-23 DIAGNOSIS — G35 MULTIPLE SCLEROSIS: ICD-10-CM

## 2019-07-23 DIAGNOSIS — Z98.890 HISTORY OF LASER REFRACTIVE SURGERY: ICD-10-CM

## 2019-07-23 DIAGNOSIS — H52.203 ASTIGMATISM OF BOTH EYES, UNSPECIFIED TYPE: ICD-10-CM

## 2019-07-23 DIAGNOSIS — H21.562 AFFERENT PUPILLARY DEFECT OF LEFT EYE: ICD-10-CM

## 2019-07-23 PROCEDURE — 92015 PR REFRACTION: ICD-10-PCS | Mod: S$GLB,,, | Performed by: OPTOMETRIST

## 2019-07-23 PROCEDURE — 92015 DETERMINE REFRACTIVE STATE: CPT | Mod: S$GLB,,, | Performed by: OPTOMETRIST

## 2019-07-23 PROCEDURE — 99999 PR PBB SHADOW E&M-EST. PATIENT-LVL III: CPT | Mod: PBBFAC,,, | Performed by: OPTOMETRIST

## 2019-07-23 PROCEDURE — 99999 PR PBB SHADOW E&M-EST. PATIENT-LVL III: ICD-10-PCS | Mod: PBBFAC,,, | Performed by: OPTOMETRIST

## 2019-07-23 PROCEDURE — 92014 PR EYE EXAM, EST PATIENT,COMPREHESV: ICD-10-PCS | Mod: S$GLB,,, | Performed by: OPTOMETRIST

## 2019-07-23 PROCEDURE — 92014 COMPRE OPH EXAM EST PT 1/>: CPT | Mod: S$GLB,,, | Performed by: OPTOMETRIST

## 2019-07-23 RX ORDER — MELOXICAM 15 MG/1
TABLET ORAL
Qty: 90 TABLET | Refills: 0 | Status: SHIPPED | OUTPATIENT
Start: 2019-07-23 | End: 2019-10-24 | Stop reason: SDUPTHER

## 2019-07-23 NOTE — PATIENT INSTRUCTIONS
History of multiple sclerosis.  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    Prior diagnosis of bilaeral ocular hypertension.  HVF test (24-2 KARO standard) and OCT RNFL thickness analysis done at last visit, without evidence of glaucomatous visual field defect.    Using drops for IOP control/reduction.  IOP high-normal in each eye today (18 mm Hg in right eye, and 19 mm Hg in left eye).    Suggest repeat HVF test when convenient.     S/P LASIK refractive surgery in both eyes.  Residual astigmatic refractive error in each eye, greater in the left eye than in the right eye.  satisfactory best-corrected VA in each eye.  Presbyopia consistent with age.     New spectacle lens Rx issued for use as desired.    Assistant to call to schedule HVF test (24-2 KARO Standard) and follow-up visit with me (Dr. Murray) on same date.   In the interim, continue to instill drops into both eyes for IOP control, as usual.     Repeat refraction in one year.

## 2019-07-23 NOTE — PROGRESS NOTES
HPI     Hypertensive Eye Exam      Additional comments: General eye exam and refraction.   History of MS.    Pt c/o distance vision is not sharp with glasses.                Comments     Patient's age: 58 y.o. WF   Occupation: Disabled due to MS  Approximate date of last eye examination:  03/13/2018  Name of last eye doctor seen: Dr. Murray  Wears glasses? Yes      If yes, wears  Full-time or part-time?  full time   Present glasses are: Bifocal, SV Distance, SV Reading?  Bifocals lens      Any problem with VA with glasses?  distance vision changes   Wears CLs?:  no   Headaches?  no   Eye pain/discomfort?  no                                                                                     Flashes?  no   Floaters?  Intermittent   Diplopia/Double vision?  no   Patient's Ocular History:          Any eye surgeries? LASIK in  2000          Any eye injury?  no          Any treatment for eye disease?   bilateral ocular hypertension -   using latanoprost 0.005% ophthalmic solution in both eyes daily.  Family history of eye disease?  no   Significant patient medical history:         1. Diabetes?  no      If yes, IDDM or NIDDM?  n/a        2. HBP?  no               3. Other (describe):  Multiple Sclerosis, Fibromyalgia    ! OTC eyedrops currently using:  Systane prn    ! Prescription eye meds currently using:  Latanoprost daily at night OU   ! Any history of allergy/adverse reaction to any eye meds used   previously?  no    ! Any history of allergy/adverse reaction to eyedrops used during prior   eye exam(s)? no    ! Any history of allergy/adverse reaction to Novacaine or similar meds?   no    ! Any history of allergy/adverse reaction to Epinephrine or similar meds?   no    ! Patient okay with use of anesthetic eyedrops to check eye pressure?    yes        ! Patient okay with use of eyedrops to dilate pupils today?  yes    !  Allergies/Medications/Medical History/Family History reviewed today?    yes       PD =     65/61  Desired reading distance =  16' (+/-)                                        Last edited by Tony Murray, OD on 7/23/2019  3:21 PM. (History)            Assessment /Plan     For exam results, see Encounter Report.    1. Ocular hypertension, bilateral  Dodd Visual Field - OU - Extended - Both Eyes   2. Afferent pupillary defect of left eye     3. Multiple sclerosis     4. History of laser refractive surgery     5. Astigmatism of both eyes, unspecified type     6. Presbyopia of both eyes                  History of multiple sclerosis.  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    Prior diagnosis of bilaeral ocular hypertension.  HVF test (24-2 KARO standard) and OCT RNFL thickness analysis done at last visit, without evidence of glaucomatous visual field defect.    Using drops for IOP control/reduction.  IOP high-normal in each eye today (18 mm Hg in right eye, and 19 mm Hg in left eye).    Suggest repeat HVF test when convenient.     S/P LASIK refractive surgery in both eyes.  Residual astigmatic refractive error in each eye, greater in the left eye than in the right eye.  satisfactory best-corrected VA in each eye.  Presbyopia consistent with age.     New spectacle lens Rx issued for use as desired.    Assistant to call to schedule HVF test (24-2 KARO Standard) and follow-up visit with me (Dr. Murray) on same date.     Repeat refraction in one year.

## 2019-07-25 ENCOUNTER — TELEPHONE (OUTPATIENT)
Dept: OPHTHALMOLOGY | Facility: CLINIC | Age: 59
End: 2019-07-25

## 2019-07-25 NOTE — TELEPHONE ENCOUNTER
Called patient left voicemail to schedule her an [facility] 24-2 alvaro std and an  appointment to see

## 2019-07-28 DIAGNOSIS — G35 MULTIPLE SCLEROSIS: ICD-10-CM

## 2019-07-28 DIAGNOSIS — R25.2 SPASTICITY: ICD-10-CM

## 2019-07-29 RX ORDER — TIZANIDINE 2 MG/1
TABLET ORAL
Qty: 270 TABLET | Refills: 1 | Status: SHIPPED | OUTPATIENT
Start: 2019-07-29 | End: 2019-10-01 | Stop reason: SDUPTHER

## 2019-08-05 ENCOUNTER — PATIENT MESSAGE (OUTPATIENT)
Dept: PSYCHIATRY | Facility: CLINIC | Age: 59
End: 2019-08-05

## 2019-08-05 RX ORDER — ROSUVASTATIN CALCIUM 10 MG/1
10 TABLET, COATED ORAL NIGHTLY
Qty: 90 TABLET | Refills: 1 | Status: SHIPPED | OUTPATIENT
Start: 2019-08-05 | End: 2020-02-16

## 2019-08-07 ENCOUNTER — PATIENT MESSAGE (OUTPATIENT)
Dept: PSYCHIATRY | Facility: CLINIC | Age: 59
End: 2019-08-07

## 2019-09-30 RX ORDER — GABAPENTIN 600 MG/1
TABLET ORAL
Qty: 450 TABLET | Refills: 1 | Status: SHIPPED | OUTPATIENT
Start: 2019-09-30 | End: 2020-03-23

## 2019-10-01 ENCOUNTER — OFFICE VISIT (OUTPATIENT)
Dept: NEUROLOGY | Facility: CLINIC | Age: 59
End: 2019-10-01
Payer: COMMERCIAL

## 2019-10-01 VITALS
SYSTOLIC BLOOD PRESSURE: 123 MMHG | HEIGHT: 60 IN | BODY MASS INDEX: 30.09 KG/M2 | HEART RATE: 78 BPM | DIASTOLIC BLOOD PRESSURE: 85 MMHG | WEIGHT: 153.25 LBS

## 2019-10-01 DIAGNOSIS — R25.2 SPASTICITY: ICD-10-CM

## 2019-10-01 DIAGNOSIS — G35 MULTIPLE SCLEROSIS: ICD-10-CM

## 2019-10-01 PROCEDURE — 3008F PR BODY MASS INDEX (BMI) DOCUMENTED: ICD-10-PCS | Mod: CPTII,S$GLB,, | Performed by: PSYCHIATRY & NEUROLOGY

## 2019-10-01 PROCEDURE — 99215 PR OFFICE/OUTPT VISIT, EST, LEVL V, 40-54 MIN: ICD-10-PCS | Mod: S$GLB,,, | Performed by: PSYCHIATRY & NEUROLOGY

## 2019-10-01 PROCEDURE — 99215 OFFICE O/P EST HI 40 MIN: CPT | Mod: S$GLB,,, | Performed by: PSYCHIATRY & NEUROLOGY

## 2019-10-01 PROCEDURE — 99999 PR PBB SHADOW E&M-EST. PATIENT-LVL IV: ICD-10-PCS | Mod: PBBFAC,,, | Performed by: PSYCHIATRY & NEUROLOGY

## 2019-10-01 PROCEDURE — 3008F BODY MASS INDEX DOCD: CPT | Mod: CPTII,S$GLB,, | Performed by: PSYCHIATRY & NEUROLOGY

## 2019-10-01 PROCEDURE — 99999 PR PBB SHADOW E&M-EST. PATIENT-LVL IV: CPT | Mod: PBBFAC,,, | Performed by: PSYCHIATRY & NEUROLOGY

## 2019-10-01 RX ORDER — TIZANIDINE 2 MG/1
TABLET ORAL
Qty: 270 TABLET | Refills: 1 | Status: SHIPPED | OUTPATIENT
Start: 2019-10-01 | End: 2019-10-24 | Stop reason: SDUPTHER

## 2019-10-01 NOTE — PROGRESS NOTES
Subjective:       Patient ID: Karen D Eleser is a 59 y.o. female who presents today for a routine clinic visit for MS.  Last visit was in February 22, 2019 with Daniela Perduecolleen IBARRA HPI:  · DMT: Copaxone   · Side effects from DMT? No  · Patient does not miss any of her medications  · Taking vitamin D3 as recommended? Yes - 5000 IU/d. Told patient to take 58787 IU/day over the weekend  · Patient currently sees a pain management physician (Dr. Diogo Mills) for her lower back pain. Currently patient is being prescribed on tramadol and hydrocodone. Patient is interested in medical marijuana and will discuss this with Dr. Mills    SOCIAL HISTORY  Social History     Tobacco Use    Smoking status: Never Smoker    Smokeless tobacco: Never Used   Substance Use Topics    Alcohol use: Yes     Comment: rare    Drug use: No     Living arrangements - the patient lives alone.  Employment Currently unemployed and is on disability     MS REVIEW OF SYMPTOMS 10/1/2019   Do you feel abnormally tired on most days? Yes   Do you feel you generally sleep well? No-patient does not sleep well and was getting ambien per her psychiatrist. However, not on this medication as this has caused hallucinations    Do you have difficulty controlling your bladder?  Yes however, vesicair has helped    Do you have difficulty controlling your bowels?  No   Do you have frequent muscle cramps, tightness or spasms in your limbs?  Yes   Do you have new visual symptoms?  Yes- follows with Dr. Villalpando    Do you have worsening difficulty with your memory or thinking? Yes   Do you have worsening symptoms of anxiety or depression?  Yes   For patients who walk, Do you have more difficulty walking?  Yes   Have you fallen since your last visit?  No   For patients who use wheelchairs: Do you have any skin wounds or breakdown? Not Applicable   Do you have difficulty using your hands?  Yes   Do you have shooting or burning pain? Yes   Do you have difficulty with  sexual function?  No   If you are sexually active, are you using birth control? Y/N  N/A Not Applicable   Do you often choke when swallowing liquids or solid food?  No   Do you experience worsening symptoms when overheated? Yes   Do you need any new equipment such as a wheelchair, walker or shower chair? No   Do you receive co-pay financial assistance for your principal MS medicine? Yes   Would you be interested in participating in an MS research trial in the future? Yes   Do you feel you have adequate family/friend support?  No   Do you have health insurance?   Yes   Are you currently employed? No   Do you receive SSDI/SSI?  No   Do you use marijuana or cannabis products? No   Have you been diagnosed with a urinary tract infection since your last visit here? No   Have you been diagnosed with a respiratory tract infection since your last visit here? No   Have you been to the emergency room since your last visit here? No   Have you been hospitalized since your last visit here?  No         FSS SCORE & INTERPRETATION 10/1/2019   FSS SCORE  62   FSS SCORE INTERPRETATION May be suffering from fatigue     MS JEAN-D SCORE & INTERPRETATION 10/1/2019   JEAN-D SCORE  39   JEAN-D INTERPRETATION  Possibility of major Depression     MS LIZZETH-7 SCORE & INTERPRETATION 10/1/2019   LIZZETH-7 SCORE  11   LIZZETH-7 SCORE INTERPRETATION Moderate Anxiety     PEQ MS MOS PAIN EFFECTS SCORE & INTERPRETATION 10/1/2019   PES SCORE 29   PES SCORE INTERPRETATION Scores can range from 6-30.  Items are scaled so that higher scores indicate a greater impact of pain on a patients mood and behavior.     No flowsheet data found.  MS BLADDER CONTROL SCORE & INTERPRETATION 10/1/2019   BLCS SCORE 1   BLCS SCORE INTERPRETATION  Scores can range from 0-22, with higher scores indicating greater bladder control problems.     No flowsheet data found.  PEQ MS IMPACT OF VISUAL IMPAIRMENT SCORE & INTERPRETATION 10/1/2019   JACOB SCALE SCORE  0   JACOB SCORE INTERPRETATION  Scores can range from 0-15, with higher scores indicating greater impact of visual problems on daily activites.     No flowsheet data found.  No flowsheet data found.          Objective:        1. 25 foot timed walk:  Timed 25 Foot Walk: 2017 10/1/2019   Did patient wear an AFO? No No   Was assistive device used? No No   Time for 25 Foot Walk (seconds) 5 5.2   Time for 25 Foot Walk (seconds) - 5.8     .No flowsheet data found.    Neurologic Exam     Mental Status   Oriented to person, place, and time.   Follows 3 step commands.   Speech: speech is normal   Level of consciousness: alert  Normal comprehension.      Cranial Nerves      CN III, IV, VI   Extraocular motions are normal.   Right pupil: Shape: regular.   Left pupil: Afferent pupillary defect      CN V   Facial sensation intact.      CN VII   Facial expression full, symmetric.      CN VIII   Hearing: intact (finger rub)     CN IX, X   Palate: symmetric     CN XI   CN XI normal.      CN XII   Tongue deviation: none     Motor Exam   Muscle bulk: normal  Overall muscle tone: normal     Strength   Right deltoid: 5/5  Left deltoid: 4/5  Right triceps: 5/5  Left triceps: 5/5  Right wrist extension: 5/5  Left wrist extension: 5/5  Right interossei: 5/5  Left interossei: 5/5  Right iliopsoas: 5/5  Left iliopsoas: 5/5  Right hamstrin/5  Left hamstrin/5  Right anterior tibial: 5/5  Left anterior tibial: 5/5  Right peroneal: 5/5  Left peroneal: 5/5     Sensory Exam   Light touch normal.   Complaining of numbness and tingling in all four extremities. Vibration sense intact all four extremities    Gait, Coordination, and Reflexes      Gait  Gait: wide-based     Coordination   Finger to nose coordination: normal  Tandem walking coordination: abnormal     Tremor   Resting tremor: absent  Action tremor: absent     Reflexes   Right brachioradialis: 2+  Left brachioradialis: 2+  Right biceps: 2+  Left biceps: 2+  Right triceps: 2+  Left triceps: 2+  Right  patellar: 3+  Left patellar: 3+  Right achilles: 2+  Left achilles: 2+    Imaging:   MRI Lumbar Spine without contrast 5/15/19   1. Multilevel degenerative changes that appears similar on 12/15/2011 probably most notable for disc protrusion and central canal stenosis at the L4-L5 level.  A transitional vertebral body is noted that is being referred to as S1 for the purposes of this exam.  2. A possible faint focus of increased T2 signal is noted on the STIR sequence posterior to the T11 vertebral body that is favored to be stable since the prior.  For more information regarding the patient's demyelinating plaque changes, please refer to the contrast enhanced brain, cervical, and thoracic spine dated the same    MRI Brain w w/o contrast   White matter changes greater than typically seen with the patient's age and with appearance consistent with the given history of multiple sclerosis without significant oval change compared to the prior exam of 1/10/2018.  No abnormal enhancement and no diffusion restriction      Labs:     Lab Results   Component Value Date    LKDJXFDX67LY 39 12/13/2016    MCFHODPS72YG 76 01/22/2016    ROZLSNIZ04OF 70 04/08/2014     No results found for: JCVINDEX, JCVANTIBODY  No results found for: MO9QFMFT, ABSOLUTECD3, BN7TVWCC, ABSOLUTECD8, AR2YEUKF, ABSOLUTECD4, LABCD48  Lab Results   Component Value Date    WBC 7.10 04/24/2015    RBC 4.04 04/24/2015    HGB 12.7 04/24/2015    HCT 39.8 04/24/2015    MCV 99 (H) 04/24/2015    MCH 31.4 (H) 04/24/2015    MCHC 31.9 (L) 04/24/2015    RDW 14.4 04/24/2015     04/24/2015    MPV 10.5 04/24/2015    GRAN 4.0 04/24/2015    GRAN 55.8 04/24/2015    LYMPH 2.2 04/24/2015    LYMPH 30.3 04/24/2015    MONO 0.6 04/24/2015    MONO 8.5 04/24/2015    EOS 0.3 04/24/2015    BASO 0.02 04/24/2015    EOSINOPHIL 4.8 04/24/2015    BASOPHIL 0.3 04/24/2015     Sodium   Date Value Ref Range Status   04/24/2015 143 136 - 145 mmol/L Final     Potassium   Date Value Ref Range  Status   04/24/2015 4.3 3.5 - 5.1 mmol/L Final     Chloride   Date Value Ref Range Status   04/24/2015 104 95 - 110 mmol/L Final     CO2   Date Value Ref Range Status   04/24/2015 30 (H) 23 - 29 mmol/L Final     Glucose   Date Value Ref Range Status   04/24/2015 92 70 - 110 mg/dL Final     BUN, Bld   Date Value Ref Range Status   04/24/2015 18 6 - 20 mg/dL Final     Creatinine   Date Value Ref Range Status   04/24/2015 0.9 0.5 - 1.4 mg/dL Final     Calcium   Date Value Ref Range Status   04/24/2015 9.8 8.7 - 10.5 mg/dL Final     Total Protein   Date Value Ref Range Status   04/24/2015 7.0 6.0 - 8.4 g/dL Final     Albumin   Date Value Ref Range Status   04/24/2015 3.6 3.5 - 5.2 g/dL Final     Total Bilirubin   Date Value Ref Range Status   04/24/2015 0.3 0.1 - 1.0 mg/dL Final     Comment:     For infants and newborns, interpretation of results should be based  on gestational age, weight and in agreement with clinical  observations.  Premature Infant recommended reference ranges:  Up to 24 hours.............<8.0 mg/dL  Up to 48 hours............<12.0 mg/dL  3-5 days..................<15.0 mg/dL  6-29 days.................<15.0 mg/dL       Alkaline Phosphatase   Date Value Ref Range Status   04/24/2015 73 55 - 135 U/L Final     AST   Date Value Ref Range Status   04/24/2015 25 10 - 40 U/L Final     ALT   Date Value Ref Range Status   04/24/2015 20 10 - 44 U/L Final     Anion Gap   Date Value Ref Range Status   04/24/2015 9 8 - 16 mmol/L Final     eGFR if    Date Value Ref Range Status   04/24/2015 >60.0 >60 mL/min/1.73 m^2 Final     eGFR if non    Date Value Ref Range Status   04/24/2015 >60.0 >60 mL/min/1.73 m^2 Final     Comment:     Calculation used to obtain the estimated glomerular filtration  rate (eGFR) is the CKD-EPI equation. Since race is unknown   in our information system, the eGFR values for   -American and Non--American patients are given   for each creatinine  result.                   Diagnosis/Assessment/Plan:    1. Multiple Sclerosis  · Assessment:Patient clinically stable and present for follow up appointment. No side effects on her Copaxone. Visiting pain clinic for her pain management   · Imaging:None   · Disease Modifying Therapies:Continue glatiramer and Vitamind D     2. MS Symptom Assessment / Management  · Spasticity: Refilled tizanadine   · Mood Disorder: managed by psychiatry          Our visit today lasted 40 minutes, and 100% of this time was spent face to face with the patient. Over 50% of this visit included discussion of the treatment plan/medication changes/symptom management/exam findings/imaging results/coordination of care. The patient agrees with the plan of care.     Edgar He MD  Neurology Resident, PGYII [facility]       Problem List Items Addressed This Visit        Neuro    Multiple sclerosis    Relevant Medications    tiZANidine (ZANAFLEX) 2 MG tablet      Other Visit Diagnoses     Spasticity        Relevant Medications    tiZANidine (ZANAFLEX) 2 MG tablet

## 2019-10-10 ENCOUNTER — PATIENT MESSAGE (OUTPATIENT)
Dept: NEUROLOGY | Facility: CLINIC | Age: 59
End: 2019-10-10

## 2019-10-15 DIAGNOSIS — G35 MULTIPLE SCLEROSIS: ICD-10-CM

## 2019-10-16 ENCOUNTER — OFFICE VISIT (OUTPATIENT)
Dept: PSYCHIATRY | Facility: CLINIC | Age: 59
End: 2019-10-16
Payer: COMMERCIAL

## 2019-10-16 VITALS
SYSTOLIC BLOOD PRESSURE: 131 MMHG | DIASTOLIC BLOOD PRESSURE: 79 MMHG | WEIGHT: 153 LBS | BODY MASS INDEX: 30.04 KG/M2 | HEART RATE: 91 BPM | HEIGHT: 60 IN

## 2019-10-16 DIAGNOSIS — F41.1 GENERALIZED ANXIETY DISORDER: ICD-10-CM

## 2019-10-16 DIAGNOSIS — G35 MULTIPLE SCLEROSIS: ICD-10-CM

## 2019-10-16 DIAGNOSIS — F34.1 DYSTHYMIC DISORDER: ICD-10-CM

## 2019-10-16 DIAGNOSIS — R53.82 CHRONIC FATIGUE: ICD-10-CM

## 2019-10-16 DIAGNOSIS — F51.04 CHRONIC INSOMNIA: ICD-10-CM

## 2019-10-16 DIAGNOSIS — F33.1 MAJOR DEPRESSIVE DISORDER, RECURRENT EPISODE, MODERATE: ICD-10-CM

## 2019-10-16 DIAGNOSIS — F33.0 MDD (MAJOR DEPRESSIVE DISORDER), RECURRENT EPISODE, MILD: Primary | ICD-10-CM

## 2019-10-16 DIAGNOSIS — F41.1 GAD (GENERALIZED ANXIETY DISORDER): ICD-10-CM

## 2019-10-16 PROCEDURE — 3008F PR BODY MASS INDEX (BMI) DOCUMENTED: ICD-10-PCS | Mod: CPTII,S$GLB,, | Performed by: PSYCHIATRY & NEUROLOGY

## 2019-10-16 PROCEDURE — 3008F BODY MASS INDEX DOCD: CPT | Mod: CPTII,S$GLB,, | Performed by: PSYCHIATRY & NEUROLOGY

## 2019-10-16 PROCEDURE — 99213 PR OFFICE/OUTPT VISIT, EST, LEVL III, 20-29 MIN: ICD-10-PCS | Mod: S$GLB,,, | Performed by: PSYCHIATRY & NEUROLOGY

## 2019-10-16 PROCEDURE — 99999 PR PBB SHADOW E&M-EST. PATIENT-LVL III: CPT | Mod: PBBFAC,,, | Performed by: PSYCHIATRY & NEUROLOGY

## 2019-10-16 PROCEDURE — 90833 PSYTX W PT W E/M 30 MIN: CPT | Mod: S$GLB,,, | Performed by: PSYCHIATRY & NEUROLOGY

## 2019-10-16 PROCEDURE — 99999 PR PBB SHADOW E&M-EST. PATIENT-LVL III: ICD-10-PCS | Mod: PBBFAC,,, | Performed by: PSYCHIATRY & NEUROLOGY

## 2019-10-16 PROCEDURE — 99213 OFFICE O/P EST LOW 20 MIN: CPT | Mod: S$GLB,,, | Performed by: PSYCHIATRY & NEUROLOGY

## 2019-10-16 PROCEDURE — 90833 PR PSYCHOTHERAPY W/PATIENT W/E&M, 30 MIN (ADD ON): ICD-10-PCS | Mod: S$GLB,,, | Performed by: PSYCHIATRY & NEUROLOGY

## 2019-10-16 RX ORDER — DEXTROAMPHETAMINE SACCHARATE, AMPHETAMINE ASPARTATE, DEXTROAMPHETAMINE SULFATE AND AMPHETAMINE SULFATE 5; 5; 5; 5 MG/1; MG/1; MG/1; MG/1
1 TABLET ORAL 2 TIMES DAILY
Qty: 60 TABLET | Refills: 0 | Status: SHIPPED | OUTPATIENT
Start: 2019-11-15 | End: 2020-05-28 | Stop reason: SDUPTHER

## 2019-10-16 RX ORDER — GLATIRAMER 40 MG/ML
INJECTION, SOLUTION SUBCUTANEOUS
Qty: 36 SYRINGE | Refills: 1 | Status: SHIPPED | OUTPATIENT
Start: 2019-10-16 | End: 2020-10-22

## 2019-10-16 RX ORDER — BUPROPION HYDROCHLORIDE 300 MG/1
300 TABLET ORAL DAILY
Qty: 90 TABLET | Refills: 3 | Status: SHIPPED | OUTPATIENT
Start: 2019-10-16 | End: 2020-10-27

## 2019-10-16 RX ORDER — DEXTROAMPHETAMINE SACCHARATE, AMPHETAMINE ASPARTATE, DEXTROAMPHETAMINE SULFATE AND AMPHETAMINE SULFATE 5; 5; 5; 5 MG/1; MG/1; MG/1; MG/1
1 TABLET ORAL 2 TIMES DAILY
Qty: 60 TABLET | Refills: 0 | Status: SHIPPED | OUTPATIENT
Start: 2019-10-16 | End: 2020-02-11 | Stop reason: SDUPTHER

## 2019-10-16 RX ORDER — DEXTROAMPHETAMINE SACCHARATE, AMPHETAMINE ASPARTATE, DEXTROAMPHETAMINE SULFATE AND AMPHETAMINE SULFATE 5; 5; 5; 5 MG/1; MG/1; MG/1; MG/1
1 TABLET ORAL 2 TIMES DAILY
Qty: 60 TABLET | Refills: 0 | Status: SHIPPED | OUTPATIENT
Start: 2019-12-14 | End: 2020-02-11 | Stop reason: SDUPTHER

## 2019-10-16 RX ORDER — DULOXETIN HYDROCHLORIDE 60 MG/1
120 CAPSULE, DELAYED RELEASE ORAL DAILY
Qty: 180 CAPSULE | Refills: 3 | Status: SHIPPED | OUTPATIENT
Start: 2019-10-16 | End: 2020-11-25

## 2019-10-16 RX ORDER — ALPRAZOLAM 0.5 MG/1
0.5 TABLET ORAL 2 TIMES DAILY
Qty: 60 TABLET | Refills: 5 | Status: SHIPPED | OUTPATIENT
Start: 2019-10-16 | End: 2020-02-11 | Stop reason: SDUPTHER

## 2019-10-16 NOTE — PROGRESS NOTES
"Outpatient Psychiatry Follow-Up Visit (MD/NP)  10/16/2019    Session Length:  30 minutes (E&M plus psychotherapy)     Clinical Status of Patient: Outpatient (Ambulatory)    Chief Complaint: Karen D Eleser is a 59 y.o. female who presents today for follow-up of chronic depression and anxiety.    Met with patient.     Interval History and Content of Current Session:  Interim Events/Subjective Report/Content of Current Session:  First f/u appt with me since 6/20/2019.  Pt had seen Dr. Tash Clark in the past; last appointment was on 9/28/2015 (this note was reviewed).      Plan from last session:  "Continue Cymbalta 120 mg daily  Continue Wellbutrin  mg total daily.  Pt has denied Hx of seizures.     Continue Xanax 0.5 mg BID prn anxiety; can also use for sleep.    Pt can take OTC Benadryl or melatonin for sleep.   Also, continue Adderall immediate release 20 mg for treatment of fatigue/focus/concentration and treatment resistant depression.  I have recommended she take no > 60 mg max per day, preferably less.  Three Rx for Adderall 20 mg, each for a 30 day supply with no refills & with "Do not fill until" dates posted accordingly, were given to patient."    She states she has been "OK".    She has been doing a lot of travelling over the summer -- took another extended vacation to the [place] -- [place], [place], [place].  She met friends she knows there and they all tour around the [facility].  She has met lots of people from [place] and [place], as well as [place], [place] and the [place]  "The trip wore me out".    She also met 2 friends in [facility] for the [employer].      She had actually sent me messages about needing med refills prior to her trip to the [place].  However, she did not tell me in time to get the Wellbutrin refilled.  She stated she saw a physician in the [place] who Rx the Wellbutrin to her and she was able to get it filled at a pharmacy there to last until her she came back home.  She also ran out of Adderall " "prior to her trip to .  After she got back, she tried to get the last Rx filled, but it was 3 months past the original date I had Rx it.  She asks for refills today.       She is seeing a new pain management specialist at [facility] on [address].  He gave her a prescription for medical marijuana -- she obtained a supply in [place].  She has been taking the marijuana for pain management, but has not seen much benefit yet.  She denies any AE's either.      Current SIGECAPS:    Sleep -- often has problems falling asleep; she generally takes Zanaflex and Xanax to be able to fall asleep.   Interests -- low in general ("I don't have anybody to do anything with" -- very few friends; had done a lot of things with her sister)    Guilt -- for "enabling my sister for so many years", not showing tough love.  Similar feelings with her mom (had to say "no" to her; feelings regarding circumstances surrounding her death).    Energy -- diminished    Concentration -- poor due to MS; has a hard time reading (Adderall helps)   Appetite -- "OK'   Psychomotor --  Somewhat decreased   Suicidal ideation -- denies     She has NOT been taking the Adderall everyday, just when needed.       She has stated she enjoys reading and watching certain TV shows, particularly historical based fiction.    She has a harder time reading since she was Dx with MS, as it has affected her ability to concentrate.    She states she has joined on line groups for various interests.     She enjoys doing things outdoors in cool weather.  She likes to travel.       Her main physical complaint is she has chronic painful neuropathy in her feet, legs due to lumbar disc herniation.    She has stated she has had problems with verbal expression of words that she wants to say (she is on gabapentin, which could have an effect on cognition).      Her father had endocarditis when he was younger.  He then had to have a pacemaker, then dual " "pacemaker/defibrillator.  He also had 3 sisters (her aunts) who all had heart disease (I did not asked if they smoked).  He had 3 older brothers who have been healthy.  She denies heart disease on her mother's side.    Her sister  from a massive MI at age 57 yo.  However, she abused pills and OTC meds and had gone to rehab at [facility] just months prior to her death.  She notes she and her sister were very close; they even lived together for a while.    Pt has denied ever having problems with her heart.  She stated she never had a stress test, but she has had "plenty of" EKG's.     Pt had neuropsychological testing.  She had the test last week and met with Dr. Butt for interpretation.  It did not show anything significant besides some slowing of processing speed likely due to the MS.     She had been treated by Dr. Royce Pina for MS until his death in .  She has also been treated by other neurologists.  Dr. Maia Minaya is her neurologist currently.      Past psych meds: celexa, lexapro, and effexor in the past.    From previous sessions:  Regarding her mom and sister:  Mother passed: 2015. Sister passed: 2014. Sister had problems with drugs and alcohol, was a nurse and lost her nursing license.  She  from a massive MI while in the [facility] house after substance rehab.  She was close to them both.  She had enjoyed going to the movies, going out to eat, travel, etc.  However, she had always done these things with her sister, who  in 2014.  She has no one in her life that she is close to compared to her sister in the past.  She has a brother who is 6 years younger -- "he can be very abrasive, he talks real loud".  "He also has his life, his children".  She also notes he does not understand or appreciate her mental illness.   --Regarding past intimate relationships: She has had just one very serious relationship that lasted for 10 years.  She states the relationship ended because he " lied to her & told her that he worked and stayed at his mother's house, caring for her.  However, he did not work, lived off his mother's SS, then was kicked out of the mom's trailer by his siblings after she , so he was homeless for a while.  She thinks he may be living with his ex-wife now.  She states this happened about 5 years ago.    Since then, she had met 2 guys on line ([employer]) -- went out with each of them a few times, but nothing ever came of this.    She notes her mother was very controlling.  Pt also was shy growing up.  Pt denies ever being sexually molested.  She denies ever having problems with alcohol or drugs.  She is out on disability from work ([employer]) as a .    She is still getting a check through her work disability co.     PSYCHOTHERAPY ADD-ON [id number]   30 (16-37*) minutes   · Target symptoms: depression, anxiety, fatigue  · Why chosen therapy is appropriate versus another modality: relevant to diagnosis, patient responds to this modality  · Outcome monitoring methods: self-report  · Therapeutic intervention type: supportive psychotherapy  · Topics discussed/themes: SEE ABOVE-- illness/death of loved ones, stress related to medical comorbidities, life stage transitional issues, social isolation, self care/nurturing.  · The patient's response to the intervention is accepting.   · The patient's progress toward treatment goals is fair.  · Duration of intervention: 18 minutes      Review of Systems   · PSYCHIATRIC: Pertinant items are noted in the narrative.  · CONSTITUTIONAL: Some recent wt fluctuations.  Chronic fatigue.     · MUSCULOSKELETAL: + chronic pain in her feet/legs  · NEUROLOGIC: Some occasional mild numbness and tingling in her arms.  No weakness, seizures, confusion, memory loss, tremor or other abnormal movements.  · ENDOCRINE: No polydipsia or polyuria.  · INTEGUMENTARY: No rashes or lacerations.  · EYES: No exophthalmos, jaundice or blindness.  · ENT:  No dizziness, tinnitus or hearing loss.  · RESPIRATORY: No shortness of breath.  · CARDIOVASCULAR: No tachycardia or chest pain.  · GASTROINTESTINAL: No nausea, vomiting, pain, constipation or diarrhea.  · GENITOURINARY: No frequency, dysuria.        Past Medical, Family and Social History: The patient's past medical, family and social history have been reviewed and updated as appropriate within the electronic medical record -- see encounter notes.       Current Outpatient Medications:     ALPRAZolam (XANAX) 0.5 MG tablet, Take 1 tablet (0.5 mg total) by mouth 2 (two) times daily., Disp: 60 tablet, Rfl: 5    b complex vitamins (VITAMINS B COMPLEX) tablet, Take by mouth. 1 Tablet Oral Every day, Disp: , Rfl:     buPROPion (WELLBUTRIN XL) 300 MG 24 hr tablet, Take 1 tablet (300 mg total) by mouth once daily. Take with food., Disp: 90 tablet, Rfl: 3    cholecalciferol, vitamin D3, (VITAMIN D3) 5,000 unit Tab, Take 5,000 Units by mouth once daily. , Disp: , Rfl:     coenzyme Q10 (CO Q-10) 400 mg Cap, Take by mouth., Disp: , Rfl:     dextroamphetamine-amphetamine (ADDERALL) 20 mg tablet, Take 1 tablet by mouth 2 (two) times daily., Disp: 60 tablet, Rfl: 0    dextroamphetamine-amphetamine (ADDERALL) 20 mg tablet, Take 1 tablet by mouth 2 (two) times daily. (Patient not taking: Reported on 10/1/2019), Disp: 60 tablet, Rfl: 0    dextroamphetamine-amphetamine (ADDERALL) 20 mg tablet, Take 1 tablet by mouth 2 (two) times daily. (Patient not taking: Reported on 10/1/2019), Disp: 60 tablet, Rfl: 0    diclofenac sodium (VOLTAREN) 1 % Gel, Apply 2 g topically 4 (four) times daily., Disp: 5 Tube, Rfl: 0    DULoxetine (CYMBALTA) 60 MG capsule, Take 2 capsules (120 mg total) by mouth once daily., Disp: 60 capsule, Rfl: 12    gabapentin (NEURONTIN) 600 MG tablet, TAKE 2 TABLETS EVERY MORNING, 1 TABLET AT NOON AND 2 TABLETS AT BEDTIME, Disp: 450 tablet, Rfl: 1    glatiramer (COPAXONE, GLATOPA) 40 mg/mL injection, INJECT  ONE SYRINGE (40 MG) SUBCUTANEOUSLY 3 TIMES A WEEK AT LEAST 48 HOURS APART. ALLOW TO WARM TO ROOM TEMP FOR 20 MINUTES. REFRIGERATE., Disp: 36 Syringe, Rfl: 1    HYDROcodone-acetaminophen (NORCO) 5-325 mg per tablet, Take 1 tablet by mouth 3 (three) times daily as needed., Disp: , Rfl: 0    ketoconazole (XOLEGEL) 2 % Gel, Apply topically., Disp: , Rfl:     latanoprost 0.005 % ophthalmic solution, PLACE 1 DROP INTO BOTH EYES EVERY EVENING., Disp: 7.5 mL, Rfl: 2    meloxicam (MOBIC) 15 MG tablet, TAKE 1 TABLET BY MOUTH EVERY DAY, Disp: 90 tablet, Rfl: 6    meloxicam (MOBIC) 15 MG tablet, TAKE 1 TABLET BY MOUTH EVERY DAY, Disp: 90 tablet, Rfl: 0    multivitamin (THERAGRAN) per tablet, Take by mouth. 1 Tablet Oral Every day, Disp: , Rfl:     PREVIDENT 5000 SENSITIVE 1.1-5 % Pste, Apply topically once daily. , Disp: , Rfl:     rosuvastatin (CRESTOR) 10 MG tablet, TAKE 1 TABLET (10 MG TOTAL) BY MOUTH EVERY EVENING., Disp: 90 tablet, Rfl: 1    tiZANidine (ZANAFLEX) 2 MG tablet, TAKE 1/2 TABLET BY MOUTH TWICE A DAY THEN 2 TABS AT BEDTIME AS NEEDED, Disp: 270 tablet, Rfl: 1    traMADol (ULTRAM) 50 mg tablet, Take 50 mg by mouth 4 (four) times daily., Disp: , Rfl: 1    VESICARE 5 mg tablet, TAKE 2 TABLETS BY MOUTH EVERY DAY, Disp: 180 tablet, Rfl: 1    She generally takes the Xanax at night, minimal during the day.    She takes Adderall when needed, not necessarily everyday.    Compliance: yes    Side effects: Ambien -- visual hallucinations and illusions, sleepwalking/sleepeating.     Risk Parameters:  Patient reports no suicidal ideation  Patient reports no homicidal ideation  Patient reports no self-injurious behavior  Patient reports no violent behavior    Exam (detailed: at least 9 elements; comprehensive: all 15 elements)    Constitutional  Vitals:  Most recent vital signs, dated less than 90 days prior to this appointment, were reviewed:     Vitals - 1 value per visit 10/1/2019 10/16/2019   SYSTOLIC 123 131  "  DIASTOLIC 85 79   PULSE 78 91   SPO2     Weight (lb) 153.22 153   Weight (kg) 69.5 69.4   HEIGHT 5' 0" 5' 0"   BODY MASS INDEX 29.92 29.88   VISIT REPORT     Pain Score  7        Vitals - 1 value per visit 2/22/2019 3/15/2019 6/20/2019   SYSTOLIC 135 139 165   DIASTOLIC 86 88 88   PULSE 83 96 69   SPO2      Weight (lb) 154  156.64   Weight (kg) 69.854  71.05   HEIGHT 5' 0" 5' 0" 5' 0"   BODY MASS INDEX 30.08 30.08 30.59   VISIT REPORT      Pain Score  5 7        Vitals - 1 value per visit 3/5/2018 3/13/2018 5/7/2018 2/21/2019   SYSTOLIC 138  154 139   DIASTOLIC 80  81 80   PULSE 79  95 83   RESPIRATIONS       SPO2 98      Weight (lb) 161.38  158.95 154.76   Weight (kg) 73.2  72.1 70.2   HEIGHT 5' 0"  5' 0" 5' 0"   BODY MASS INDEX 31.52  31.04 30.23   VISIT REPORT       Pain Score  0 0         Vitals - 1 value per visit 8/10/2017 11/2/2017 12/28/2017 2/1/2018   SYSTOLIC 138 144 120 145   DIASTOLIC 84 73 75 78   PULSE 95 104 91 88   RESPIRATIONS       SPO2       Weight (lb) 152 150 152 162.2   Weight (kg) 68.947 68.04 68.947 73.573   HEIGHT 5' 0" 5' 0"  5' 0"   BODY MASS INDEX 29.69 29.29 29.69 31.68   VISIT REPORT       Pain Score    6         General:  unremarkable, age appropriate, well dressed, neatly groomed. Not wearing make-up today (normal for her with Zoroastrianism/charismatic elin)     Musculoskeletal  Muscle Strength/Tone:  no tremor, no tic    Gait & Station:  non-ataxic      Psychiatric  Speech:  normal tone, normal rate, normal pitch, normal volume, spontaneous    Mood & Affect:  "OK"  congruent and appropriate to situation/content. Appropriately tearful when discussing aspects of grief.    Thought Process:  normal and logical, goal-directed    Associations:  intact    Thought Content:  normal, no suicidality, no homicidality, delusions, or paranoia    Insight & Judgement:  good, intact  adequate to circumstances, age appropriate, good    Orientation:  grossly intact, person, place, time/date, situation " "   Memory:  intact for content of interview, grossly intact    Language:  grossly intact    Attention Span & Concentration:  able to focus    Fund of Knowledge:  intact and appropriate to age and level of education, familiar with aspects of current personal life      Prior EKG:  Test Reason : Z79.899  Vent. Rate : 077 BPM     Atrial Rate : 077 BPM     P-R Int : 136 ms          QRS Dur : 090 ms      QT Int : 406 ms       P-R-T Axes : 033 000 073 degrees     QTc Int : 459 ms  Normal sinus rhythm  Poor R wave progression  Abnormal ECG  When compared with ECG of 21-JUL-2008 08:45,  Nonspecific T wave abnormality no longer evident in Inferior leads  QT has lengthened  Confirmed by Yaneli Ferguson MD ([id number]) on 8/11/2017 4:36:19 PM      Assessment and Diagnosis    Status/Progress: Based on the examination today, the patient's problem(s) is/are adequately, but not ideally controlled. New problems have not been presented today. Comorbidities (chronic neuropathic pain) are complicating management of the primary condition. There are no active rule-out diagnoses for this patient at this time.    General Impression:   Major Depressive Disorder, Recurrent: Mild  Dysthymic Disorder   Generalized Anxiety Disorder  Chronic Insomnia   Multiple Sclerosis (causes cognitive difficulties)   Also: chronic pain, CHELSEA    Intervention/Counseling/Treatment Plan    Continue all current meds:  Continue Cymbalta 120 mg daily  Continue Wellbutrin  mg total daily.  Pt has denied Hx of seizures.     Continue Xanax 0.5 mg BID prn anxiety; can also use for sleep.    Also, continue Adderall immediate release 20 mg for treatment of fatigue/focus/concentration and treatment resistant depression.  I have recommended she take no > 40 mg max per day,  preferably less when not needed.  Three Rx for Adderall 20 mg, each for a 30 day supply with no refills & with "Do not fill until" dates posted accordingly, were given to patient.   Pt can take OTC " Benadryl or melatonin for sleep.   Encourage individual psychotherapy for support. Patient had been meeting with LCSW in Neurology clinic; she can continue this (if insurance allows) or consider f/u with Dr. Nery Toledo or other therapist in our clinic.        Additional Notes:  N/A      Return to Clinic: 3 - 4 months, or sooner prn.

## 2019-10-21 DIAGNOSIS — G35 MS (MULTIPLE SCLEROSIS): ICD-10-CM

## 2019-10-22 RX ORDER — SOLIFENACIN SUCCINATE 5 MG/1
TABLET, FILM COATED ORAL
Qty: 180 TABLET | Refills: 1 | Status: SHIPPED | OUTPATIENT
Start: 2019-10-22 | End: 2020-01-26

## 2019-10-24 DIAGNOSIS — R25.2 SPASTICITY: ICD-10-CM

## 2019-10-24 DIAGNOSIS — G35 MULTIPLE SCLEROSIS: ICD-10-CM

## 2019-10-24 RX ORDER — MELOXICAM 15 MG/1
TABLET ORAL
Qty: 90 TABLET | Refills: 0 | Status: SHIPPED | OUTPATIENT
Start: 2019-10-24 | End: 2020-01-26

## 2019-10-25 RX ORDER — TIZANIDINE 2 MG/1
TABLET ORAL
Qty: 270 TABLET | Refills: 1 | Status: SHIPPED | OUTPATIENT
Start: 2019-10-25 | End: 2020-07-27

## 2019-10-30 ENCOUNTER — OFFICE VISIT (OUTPATIENT)
Dept: RHEUMATOLOGY | Facility: CLINIC | Age: 59
End: 2019-10-30
Attending: INTERNAL MEDICINE
Payer: COMMERCIAL

## 2019-10-30 VITALS
SYSTOLIC BLOOD PRESSURE: 99 MMHG | BODY MASS INDEX: 29.64 KG/M2 | HEART RATE: 70 BPM | WEIGHT: 151 LBS | HEIGHT: 60 IN | TEMPERATURE: 98 F | DIASTOLIC BLOOD PRESSURE: 65 MMHG

## 2019-10-30 DIAGNOSIS — M15.9 GENERALIZED OSTEOARTHRITIS: Primary | ICD-10-CM

## 2019-10-30 DIAGNOSIS — M79.7 FIBROMYALGIA: ICD-10-CM

## 2019-10-30 DIAGNOSIS — Z79.1 LONG TERM (CURRENT) USE OF NON-STEROIDAL ANTI-INFLAMMATORIES (NSAID): ICD-10-CM

## 2019-10-30 PROCEDURE — 99213 OFFICE O/P EST LOW 20 MIN: CPT | Mod: S$GLB,,, | Performed by: INTERNAL MEDICINE

## 2019-10-30 PROCEDURE — 99999 PR PBB SHADOW E&M-EST. PATIENT-LVL III: CPT | Mod: PBBFAC,,, | Performed by: INTERNAL MEDICINE

## 2019-10-30 PROCEDURE — 3008F PR BODY MASS INDEX (BMI) DOCUMENTED: ICD-10-PCS | Mod: CPTII,S$GLB,, | Performed by: INTERNAL MEDICINE

## 2019-10-30 PROCEDURE — 99213 PR OFFICE/OUTPT VISIT, EST, LEVL III, 20-29 MIN: ICD-10-PCS | Mod: S$GLB,,, | Performed by: INTERNAL MEDICINE

## 2019-10-30 PROCEDURE — 3008F BODY MASS INDEX DOCD: CPT | Mod: CPTII,S$GLB,, | Performed by: INTERNAL MEDICINE

## 2019-10-30 PROCEDURE — 99999 PR PBB SHADOW E&M-EST. PATIENT-LVL III: ICD-10-PCS | Mod: PBBFAC,,, | Performed by: INTERNAL MEDICINE

## 2019-11-01 NOTE — PROGRESS NOTES
History of present illness:  59-year-old female with multiple sclerosis and dysthymic disorder.  She continues to follow with Neurology in psychology.  I had been following her for osteoarthritis and fibromyalgia.  This has involved primarily the back and knees.  She continues on meloxicam, Zanaflex, and gabapentin.  She also get Cymbalta from her psychiatrist.  She has recently been evaluated by pain management.  She had RFA without any response.  He added hydrocodone to her tramadol.  He also started her on medical marijuana.  She has noticed some increased pain in her knees.  It is worse with walking.  She had a recent MRI but does not know the results.  She complains of burning in her feet and left arm.  She has had some pain in the hip.  She also complains of fatigue.  She thinks the medications are helping.    Physical examination:  Musculoskeletal:  Cervical spine has good range of motion without pain on range of motion.  She has decreased range of motion left shoulder with some pain on range of motion.  Right shoulder is unremarkable.  Elbows and wrists are unremarkable.  She has bony hypertrophy of the DIPs.  She does have diffuse tenderness in the upper extremities.  She has pain on range of motion lumbar spine.  Straight leg raising is positive on the right.  She has tenderness of the left greater trochanter.  She has good range of motion of the hips.  She has pain on range of motion of the left knee with decreased range of motion.  She has crepitus on range of motion.  She has tenderness of the anserine bursa.  Right knee has mild bony hypertrophy but no tenderness.  Ankles are unremarkable.  Feet are tender.    Assessment:  Osteoarthritis and fibromyalgia    Plans:  1.  Continue meloxicam, Zanaflex, and gabapentin as before  2.  Narcotic therapy will be given by pain management  3.  Return to see me in 12 months

## 2019-11-15 DIAGNOSIS — Z12.11 COLON CANCER SCREENING: ICD-10-CM

## 2019-12-12 NOTE — TELEPHONE ENCOUNTER
EXAM DATE: 12/12/19



PATIENT'S AGE: 40



Pelvis: AP view of the pelvis was obtained.



Comparison: No prior pelvis exam.



Joint spaces are maintained within both hips.  Sacroiliac joints are within 
normal limits.  IUD is noted within the pelvis.  No fracture or other 
abnormality is seen.



Impression:

1.  Nothing acute is seen on AP pelvis study.



Diagnostic code #1



This report was dictated in [place] Standard Time



Report Signed by Proxy.



[facility] from [employer] to pt's home address.  Copy kept for chart.

## 2020-01-26 DIAGNOSIS — G35 MS (MULTIPLE SCLEROSIS): ICD-10-CM

## 2020-01-26 RX ORDER — SOLIFENACIN SUCCINATE 5 MG/1
TABLET, FILM COATED ORAL
Qty: 30 TABLET | Refills: 3 | Status: SHIPPED | OUTPATIENT
Start: 2020-01-26 | End: 2020-04-09 | Stop reason: SDUPTHER

## 2020-01-26 RX ORDER — MELOXICAM 15 MG/1
TABLET ORAL
Qty: 90 TABLET | Refills: 3 | Status: SHIPPED | OUTPATIENT
Start: 2020-01-26 | End: 2021-01-28

## 2020-02-11 ENCOUNTER — OFFICE VISIT (OUTPATIENT)
Dept: PSYCHIATRY | Facility: CLINIC | Age: 60
End: 2020-02-11
Payer: COMMERCIAL

## 2020-02-11 VITALS
SYSTOLIC BLOOD PRESSURE: 143 MMHG | DIASTOLIC BLOOD PRESSURE: 82 MMHG | BODY MASS INDEX: 30.26 KG/M2 | HEIGHT: 60 IN | HEART RATE: 84 BPM | WEIGHT: 154.13 LBS

## 2020-02-11 DIAGNOSIS — F34.1 DYSTHYMIC DISORDER: ICD-10-CM

## 2020-02-11 DIAGNOSIS — F41.1 GENERALIZED ANXIETY DISORDER: ICD-10-CM

## 2020-02-11 DIAGNOSIS — R53.82 CHRONIC FATIGUE: ICD-10-CM

## 2020-02-11 DIAGNOSIS — F51.04 CHRONIC INSOMNIA: ICD-10-CM

## 2020-02-11 DIAGNOSIS — F41.1 GAD (GENERALIZED ANXIETY DISORDER): ICD-10-CM

## 2020-02-11 DIAGNOSIS — G35 MULTIPLE SCLEROSIS: ICD-10-CM

## 2020-02-11 DIAGNOSIS — F33.0 MDD (MAJOR DEPRESSIVE DISORDER), RECURRENT EPISODE, MILD: Primary | ICD-10-CM

## 2020-02-11 PROCEDURE — 99213 OFFICE O/P EST LOW 20 MIN: CPT | Mod: S$GLB,,, | Performed by: PSYCHIATRY & NEUROLOGY

## 2020-02-11 PROCEDURE — 99999 PR PBB SHADOW E&M-EST. PATIENT-LVL III: CPT | Mod: PBBFAC,,, | Performed by: PSYCHIATRY & NEUROLOGY

## 2020-02-11 PROCEDURE — 90833 PSYTX W PT W E/M 30 MIN: CPT | Mod: S$GLB,,, | Performed by: PSYCHIATRY & NEUROLOGY

## 2020-02-11 PROCEDURE — 99999 PR PBB SHADOW E&M-EST. PATIENT-LVL III: ICD-10-PCS | Mod: PBBFAC,,, | Performed by: PSYCHIATRY & NEUROLOGY

## 2020-02-11 PROCEDURE — 3008F PR BODY MASS INDEX (BMI) DOCUMENTED: ICD-10-PCS | Mod: CPTII,S$GLB,, | Performed by: PSYCHIATRY & NEUROLOGY

## 2020-02-11 PROCEDURE — 90833 PR PSYCHOTHERAPY W/PATIENT W/E&M, 30 MIN (ADD ON): ICD-10-PCS | Mod: S$GLB,,, | Performed by: PSYCHIATRY & NEUROLOGY

## 2020-02-11 PROCEDURE — 3008F BODY MASS INDEX DOCD: CPT | Mod: CPTII,S$GLB,, | Performed by: PSYCHIATRY & NEUROLOGY

## 2020-02-11 PROCEDURE — 99213 PR OFFICE/OUTPT VISIT, EST, LEVL III, 20-29 MIN: ICD-10-PCS | Mod: S$GLB,,, | Performed by: PSYCHIATRY & NEUROLOGY

## 2020-02-11 RX ORDER — DEXTROAMPHETAMINE SACCHARATE, AMPHETAMINE ASPARTATE, DEXTROAMPHETAMINE SULFATE AND AMPHETAMINE SULFATE 5; 5; 5; 5 MG/1; MG/1; MG/1; MG/1
1 TABLET ORAL 2 TIMES DAILY
Qty: 60 TABLET | Refills: 0 | Status: SHIPPED | OUTPATIENT
Start: 2020-02-11 | End: 2020-05-28 | Stop reason: SDUPTHER

## 2020-02-11 RX ORDER — ALPRAZOLAM 0.5 MG/1
0.5 TABLET ORAL 2 TIMES DAILY
Qty: 60 TABLET | Refills: 5 | Status: SHIPPED | OUTPATIENT
Start: 2020-02-11 | End: 2020-09-01 | Stop reason: SDUPTHER

## 2020-02-11 RX ORDER — DEXTROAMPHETAMINE SACCHARATE, AMPHETAMINE ASPARTATE, DEXTROAMPHETAMINE SULFATE AND AMPHETAMINE SULFATE 5; 5; 5; 5 MG/1; MG/1; MG/1; MG/1
1 TABLET ORAL 2 TIMES DAILY
Qty: 60 TABLET | Refills: 0 | Status: SHIPPED | OUTPATIENT
Start: 2020-03-10 | End: 2020-05-28 | Stop reason: SDUPTHER

## 2020-02-11 NOTE — PROGRESS NOTES
"Outpatient Psychiatry Follow-Up Visit (MD/NP)  02/11/2020    Session Length:  30 minutes (E&M plus psychotherapy)     Clinical Status of Patient: Outpatient (Ambulatory)    Chief Complaint: Karen D Eleser is a 59 y.o. female who presents today for follow-up of chronic depression and anxiety.    Met with patient.     Interval History and Content of Current Session:  Interim Events/Subjective Report/Content of Current Session:  First f/u appt with me since 10/16/2019.  Pt had seen Dr. Tash Clark in the past; last appointment was on 9/28/2015 (this note was reviewed).      Plan from last session:  "Continue Cymbalta 120 mg daily.  Continue Wellbutrin  mg total daily.  Pt has denied Hx of seizures.     Continue Xanax 0.5 mg BID prn anxiety; can also use for sleep.    Also, continue Adderall immediate release 20 mg for treatment of fatigue/focus/concentration and treatment resistant depression.  I have recommended she take no > 40 mg max per day,  preferably less when not needed.  Three Rx for Adderall 20 mg, each for a 30 day supply with no refills & with "Do not fill until" dates posted accordingly, were given to patient.   Pt can take OTC Benadryl or melatonin for sleep.   Encourage individual psychotherapy for support. Patient had been meeting with LCSW in Neurology clinic; she can continue this (if insurance allows) or consider f/u with Dr. Nery Toledo or other therapist in our clinic."    She states she has been "OK", "about the same; no major ups or downs".    She did see her brother, sister-in-law and their 2 sons (in their 20's) over the [place] holidays.        Current SIGECAPS:    Sleep -- often has problems falling asleep; she generally takes Zanaflex and Xanax to be able to fall asleep.  She will tend to keep her bedside light on.    Interests -- low in general.  Still does not do much -- tends to stay at home, watch TV or read.      Guilt -- for "enabling my sister for so many years", not " "showing tough love.  Similar feelings with her mom (had to say "no" to her; feelings regarding circumstances surrounding her death).    Energy -- diminished    Concentration -- poor due to MS; has a hard time reading (Adderall helps)   Appetite -- "fine"   Psychomotor --  Somewhat decreased   Suicidal ideation -- denies        She will get depressed at times, particularly when she thinks about her mom or sister.  "I don't know the last time I felt good".    Will take Adderall -- it gives her energy, but also makes her feel jittery, especially when she takes an entire tablet.    She has NOT been taking the Adderall everyday, just when needed.       She is seeing a new pain management specialist at [facility] on [address] [place].    He gave her a prescription for medical marijuana -- she obtains this in [place].  She states the medical marijuana has been helping some with pain management in her lower back and neck.   "It's expensive.  You have to pay in cash for it".    She can use it up to twice a day.  She uses the liquid form that she puts under her tongue.    She has used less Tramadol as a result.     She has chronic numbness and tingling in her feet and left arm.        She needs to have arthroscopic surgery on her left knee for a torn meniscus -- she will need to see an orthopedic specialist.   She has arthritis in both knees.      She has stated she enjoys reading and watching certain TV shows, particularly historical based fiction.    She has a harder time reading since she was Dx with MS, as it has affected her ability to concentrate.    She states she has joined on line groups for various interests.     She enjoys doing things outdoors in cool weather.  She likes to travel.     She is considering what to do regarding travel this year -- she would like to travel back to [place] and meet up with her group of women.        She asks what can be done with her overall gus`e feeling about her " "day to day activities.    I told her she is on the maximum recommended doses of Cymbalta and Wellbutrin, so I was averse to try to increase the doses of these meds.  I told her it is likely these meds are still helping despite her feeling this way much of the time.    I also took a look at her other meds and noted she is taking a large dosage of gabapentin daily for burning neuropathic pain in her feet: she takes 1200 mg in AM, 1200 mg qhs and 600 mg in midday.  I told pt to try to cut the AM dose back to just one 600 mg dose in the AM and use the afternoon dose more as needed while keeping the PM dose the same.    Her main physical complaint is she has chronic painful neuropathy in her feet, legs due to lumbar disc herniation.    She has stated she has had problems with verbal expression of words that she wants to say (she is on gabapentin, which could have an effect on cognition).        Her father had endocarditis when he was younger.  He then had to have a pacemaker, then dual pacemaker/defibrillator.  He also had 3 sisters (her aunts) who all had heart disease (I did not asked if they smoked).  He had 3 older brothers who have been healthy.  She denies heart disease on her mother's side.    Her sister  from a massive MI at age 55 yo.  However, she abused pills and OTC meds and had gone to rehab at [place] just months prior to her death.  She notes she and her sister were very close; they even lived together for a while.    Pt has denied ever having problems with her heart.  She stated she never had a stress test, but she has had "plenty of" EKG's.     Pt had neuropsychological testing on 19 with Dr. Butt for interpretation.  It did not show anything significant besides some slowing of processing speed thought likely due to the MS.   She had been treated by Dr. Royce Pina for MS until his death in .  She has also been treated by other neurologists.  Dr. Maia Minaya is her neurologist " "currently.      Past psych meds: celexa, lexapro, and effexor in the past.    From previous sessions:  Regarding her mom and sister:  Mother passed: 2015. Sister passed: 2014. Sister had problems with drugs and alcohol, was a nurse and lost her nursing license.  She  from a massive MI while in the [facility] house after substance rehab.  She was close to them both.  She had enjoyed going to the movies, going out to eat, travel, etc.  However, she had always done these things with her sister, who  in 2014.  She has no one in her life that she is close to compared to her sister in the past.  She has a brother who is 6 years younger -- "he can be very abrasive, he talks real loud".  "He also has his life, his children".  She also notes he does not understand or appreciate her mental illness.   --Regarding past intimate relationships: She has had just one very serious relationship that lasted for 10 years.  She states the relationship ended because he lied to her & told her that he worked and stayed at his mother's house, caring for her.  However, he did not work, lived off his mother's SS, then was kicked out of the mom's trailer by his siblings after she , so he was homeless for a while.  She thinks he may be living with his ex-wife now.  She states this happened about 5 years ago.    Since then, she had met 2 guys on line ([employer]) -- went out with each of them a few times, but nothing ever came of this.    She notes her mother was very controlling.  Pt also was shy growing up.  Pt denies ever being sexually molested.  She denies ever having problems with alcohol or drugs.  She is out on disability from work ([employer]) as a .    She is still getting a check through her work disability co.     PSYCHOTHERAPY ADD-ON +[id number]   30 (16-37*) minutes   · Target symptoms: depression, anxiety, fatigue  · Why chosen therapy is appropriate versus another modality: relevant to " diagnosis, patient responds to this modality  · Outcome monitoring methods: self-report  · Therapeutic intervention type: supportive psychotherapy  · Topics discussed/themes: SEE ABOVE-- illness/death of loved ones, stress related to medical comorbidities, life stage transitional issues, social isolation, self care/nurturing.  · The patient's response to the intervention is accepting.   · The patient's progress toward treatment goals is fair.  · Duration of intervention: 18 minutes      Review of Systems   · PSYCHIATRIC: Pertinant items are noted in the narrative.  · CONSTITUTIONAL: Some recent wt fluctuations.  Chronic fatigue.     · MUSCULOSKELETAL:  Chronic generalized aches/pains.   · NEUROLOGIC: Some occasional mild numbness and tingling in her arms.  + chronic nuropathic pain in her feet/legs.  No weakness, seizures, confusion, memory loss, tremor or other abnormal movements.  · ENDOCRINE: No polydipsia or polyuria.  · INTEGUMENTARY: No rashes or lacerations.  · EYES: No exophthalmos, jaundice or blindness.  · ENT: No dizziness, tinnitus or hearing loss.  · RESPIRATORY: No shortness of breath.  · CARDIOVASCULAR: No tachycardia or chest pain.  · GASTROINTESTINAL: No nausea, vomiting, pain, constipation or diarrhea.  · GENITOURINARY: No frequency, dysuria.        Past Medical, Family and Social History: The patient's past medical, family and social history have been reviewed and updated as appropriate within the electronic medical record -- see encounter notes.       Current Outpatient Medications:     ALPRAZolam (XANAX) 0.5 MG tablet, Take 1 tablet (0.5 mg total) by mouth 2 (two) times daily., Disp: 60 tablet, Rfl: 5    b complex vitamins (VITAMINS B COMPLEX) tablet, Take by mouth. 1 Tablet Oral Every day, Disp: , Rfl:     buPROPion (WELLBUTRIN XL) 300 MG 24 hr tablet, Take 1 tablet (300 mg total) by mouth once daily. Take with food., Disp: 90 tablet, Rfl: 3    cholecalciferol, vitamin D3, (VITAMIN D3)  5,000 unit Tab, Take 5,000 Units by mouth once daily. , Disp: , Rfl:     coenzyme Q10 (CO Q-10) 400 mg Cap, Take by mouth., Disp: , Rfl:     dextroamphetamine-amphetamine (ADDERALL) 20 mg tablet, Take 1 tablet by mouth 2 (two) times daily., Disp: 60 tablet, Rfl: 0    dextroamphetamine-amphetamine (ADDERALL) 20 mg tablet, Take 1 tablet by mouth 2 (two) times daily. (Patient not taking: Reported on 10/30/2019), Disp: 60 tablet, Rfl: 0    dextroamphetamine-amphetamine (ADDERALL) 20 mg tablet, Take 1 tablet by mouth 2 (two) times daily. (Patient not taking: Reported on 10/30/2019), Disp: 60 tablet, Rfl: 0    diclofenac sodium (VOLTAREN) 1 % Gel, Apply 2 g topically 4 (four) times daily., Disp: 5 Tube, Rfl: 0    DULoxetine (CYMBALTA) 60 MG capsule, Take 2 capsules (120 mg total) by mouth once daily., Disp: 180 capsule, Rfl: 3    FLUZONE QUAD 8921-1571, PF, 60 mcg (15 mcg x 4)/0.5 mL Syrg, TO BE ADMINISTERED BY PHARMACIST FOR IMMUNIZATION, Disp: , Rfl: 0    gabapentin (NEURONTIN) 600 MG tablet, TAKE 2 TABLETS EVERY MORNING, 1 TABLET AT NOON AND 2 TABLETS AT BEDTIME, Disp: 450 tablet, Rfl: 1    glatiramer (COPAXONE, GLATOPA) 40 mg/mL injection, INJECT ONE SYRINGE (40 MG) SUBCUTANEOUSLY 3 TIMES A WEEK AT LEAST 48 HOURS APART. ALLOW TO WARM TO ROOM TEMP FOR 20 MINUTES. REFRIGERATE., Disp: 36 Syringe, Rfl: 1    HYDROcodone-acetaminophen (NORCO) 5-325 mg per tablet, Take 1 tablet by mouth 3 (three) times daily as needed., Disp: , Rfl: 0    ketoconazole (XOLEGEL) 2 % Gel, Apply topically., Disp: , Rfl:     latanoprost 0.005 % ophthalmic solution, PLACE 1 DROP INTO BOTH EYES EVERY EVENING., Disp: 7.5 mL, Rfl: 2    meloxicam (MOBIC) 15 MG tablet, TAKE 1 TABLET BY MOUTH EVERY DAY, Disp: 90 tablet, Rfl: 6    meloxicam (MOBIC) 15 MG tablet, TAKE 1 TABLET BY MOUTH EVERY DAY, Disp: 90 tablet, Rfl: 3    multivitamin (THERAGRAN) per tablet, Take by mouth. 1 Tablet Oral Every day, Disp: , Rfl:     PREVIDENT 5000 SENSITIVE  "1.1-5 % Pste, Apply topically once daily. , Disp: , Rfl:     rosuvastatin (CRESTOR) 10 MG tablet, TAKE 1 TABLET (10 MG TOTAL) BY MOUTH EVERY EVENING., Disp: 90 tablet, Rfl: 1    solifenacin (VESICARE) 5 MG tablet, TAKE 2 TABLETS BY MOUTH EVERY DAY, Disp: 30 tablet, Rfl: 3    tiZANidine (ZANAFLEX) 2 MG tablet, TAKE 1/2 TABLET BY MOUTH TWICE A DAY THEN 2 TABS AT BEDTIME AS NEEDED, Disp: 270 tablet, Rfl: 1    traMADol (ULTRAM) 50 mg tablet, Take 50 mg by mouth 4 (four) times daily., Disp: , Rfl: 1    She generally takes the Xanax at night, minimal during the day.    She takes Adderall when needed, not necessarily everyday.    Compliance: yes    Side effects: Ambien -- visual hallucinations and illusions, sleepwalking/sleepeating.     Risk Parameters:  Patient reports no suicidal ideation  Patient reports no homicidal ideation  Patient reports no self-injurious behavior  Patient reports no violent behavior    Exam (detailed: at least 9 elements; comprehensive: all 15 elements)    Constitutional  Vitals:  Most recent vital signs, dated less than 90 days prior to this appointment, were reviewed:     Vitals - 1 value per visit 10/1/2019 10/16/2019 10/30/2019 2/11/2020   SYSTOLIC 123 131 99 143   DIASTOLIC 85 79 65 82   PULSE 78 91 70 84   TEMPERATURE   98.2    SPO2       Weight (lb) 153.22 153 151 154.1   Weight (kg) 69.5 69.4 68.493 69.9   HEIGHT 5' 0" 5' 0" 5' 0" 5' 0"   BODY MASS INDEX 29.92 29.88 29.49 30.1   VISIT REPORT       Pain Score  7  6        Vitals - 1 value per visit 2/22/2019 3/15/2019 6/20/2019   SYSTOLIC 135 139 165   DIASTOLIC 86 88 88   PULSE 83 96 69   SPO2      Weight (lb) 154  156.64   Weight (kg) 69.854  71.05   HEIGHT 5' 0" 5' 0" 5' 0"   BODY MASS INDEX 30.08 30.08 30.59   VISIT REPORT      Pain Score  5 7        Vitals - 1 value per visit 3/5/2018 3/13/2018 5/7/2018 2/21/2019   SYSTOLIC 138  154 139   DIASTOLIC 80  81 80   PULSE 79  95 83   RESPIRATIONS       SPO2 98      Weight (lb) 161.38  " "158.95 154.76   Weight (kg) 73.2  72.1 70.2   HEIGHT 5' 0"  5' 0" 5' 0"   BODY MASS INDEX 31.52  31.04 30.23   VISIT REPORT       Pain Score  0 0         Vitals - 1 value per visit 8/10/2017 11/2/2017 12/28/2017 2/1/2018   SYSTOLIC 138 144 120 145   DIASTOLIC 84 73 75 78   PULSE 95 104 91 88   RESPIRATIONS       SPO2       Weight (lb) 152 150 152 162.2   Weight (kg) 68.947 68.04 68.947 73.573   HEIGHT 5' 0" 5' 0"  5' 0"   BODY MASS INDEX 29.69 29.29 29.69 31.68   VISIT REPORT       Pain Score    6         General:  unremarkable, age appropriate, well dressed, neatly groomed. Not wearing make-up today (normal for her with Roman [employer]/charismatic elin)     Musculoskeletal  Muscle Strength/Tone:  no tremor, no tic    Gait & Station:  non-ataxic      Psychiatric  Speech:  normal tone, normal rate, normal pitch, normal volume, spontaneous    Mood & Affect:  "OK"  congruent and appropriate to situation/content. Appropriately tearful when discussing aspects of grief.    Thought Process:  normal and logical, goal-directed    Associations:  intact    Thought Content:  normal, no suicidality, no homicidality, delusions, or paranoia    Insight & Judgement:  good, intact  adequate to circumstances, age appropriate, good    Orientation:  grossly intact, person, place, time/date, situation    Memory:  intact for content of interview, grossly intact    Language:  grossly intact    Attention Span & Concentration:  able to focus    Fund of Knowledge:  intact and appropriate to age and level of education, familiar with aspects of current personal life      Prior EKG:  Test Reason : Z79.899  Vent. Rate : 077 BPM     Atrial Rate : 077 BPM     P-R Int : 136 ms          QRS Dur : 090 ms      QT Int : 406 ms       P-R-T Axes : 033 000 073 degrees     QTc Int : 459 ms  Normal sinus rhythm  Poor R wave progression  Abnormal ECG  When compared with ECG of 21-JUL-2008 08:45,  Nonspecific T wave abnormality no longer evident in Inferior " "leads  QT has lengthened  Confirmed by Yaneli Ferguson MD ([id number]) on 8/11/2017 4:36:19 PM      Assessment and Diagnosis    Status/Progress: Based on the examination today, the patient's problem(s) is/are adequately, but not ideally controlled. New problems have not been presented today. Comorbidities (chronic neuropathic pain) are complicating management of the primary condition. There are no active rule-out diagnoses for this patient at this time.    General Impression:   Major Depressive Disorder, Recurrent: Mild  Dysthymic Disorder   Generalized Anxiety Disorder  Chronic Insomnia   Multiple Sclerosis (causes cognitive difficulties)   Also: chronic pain, CHELSEA    Intervention/Counseling/Treatment Plan    Continue all current meds:  Change dosing of gabapentin as noted above.    Continue Cymbalta 120 mg daily  Continue Wellbutrin  mg total daily.  Pt has denied Hx of seizures.     Continue Xanax 0.5 mg BID prn anxiety; can also use for sleep.    Also, continue Adderall immediate release 20 mg for treatment of fatigue/focus/concentration and treatment resistant depression.  I have recommended she take no > 40 mg max per day,  preferably less when not needed.  Three Rx for Adderall 20 mg, each for a 30 day supply with no refills & with "Do not fill until" dates posted accordingly, were given to patient.   Pt can take OTC Benadryl or melatonin for sleep.   Encourage individual psychotherapy for support. Patient had been meeting with LCSW in Neurology clinic; she can continue this (if insurance allows) or consider f/u with Dr. Nery Toledo or other therapist in our clinic.        Additional Notes:  N/A      Return to Clinic: 3 - 4 months, or sooner prn.  "

## 2020-02-16 RX ORDER — ROSUVASTATIN CALCIUM 10 MG/1
10 TABLET, COATED ORAL NIGHTLY
Qty: 90 TABLET | Refills: 1 | Status: SHIPPED | OUTPATIENT
Start: 2020-02-16 | End: 2020-06-15

## 2020-02-17 ENCOUNTER — TELEPHONE (OUTPATIENT)
Dept: INTERNAL MEDICINE | Facility: CLINIC | Age: 60
End: 2020-02-17

## 2020-02-17 ENCOUNTER — PATIENT MESSAGE (OUTPATIENT)
Dept: INTERNAL MEDICINE | Facility: CLINIC | Age: 60
End: 2020-02-17

## 2020-02-17 DIAGNOSIS — Z12.31 ENCOUNTER FOR SCREENING MAMMOGRAM FOR BREAST CANCER: Primary | ICD-10-CM

## 2020-02-17 NOTE — TELEPHONE ENCOUNTER
----- Message from Zena Urbina sent at 2/17/2020 12:38 PM CST -----  Contact: [phone]  Caller is requesting to schedule their annual screening mammogram appointment. Order is not listed in Epic.  Please enter order and contact patient to schedule.  Where would they like the mammogram performed?:    Would the patient rather receive a phone call back or a response via [facility]?: call back    Additional information:  Please advise, thanks

## 2020-03-10 ENCOUNTER — PATIENT MESSAGE (OUTPATIENT)
Dept: NEUROLOGY | Facility: CLINIC | Age: 60
End: 2020-03-10

## 2020-03-10 ENCOUNTER — LAB VISIT (OUTPATIENT)
Dept: LAB | Facility: HOSPITAL | Age: 60
End: 2020-03-10
Attending: INTERNAL MEDICINE
Payer: COMMERCIAL

## 2020-03-10 DIAGNOSIS — Z00.00 WELLNESS EXAMINATION: ICD-10-CM

## 2020-03-10 LAB
BASOPHILS # BLD AUTO: 0.06 K/UL (ref 0–0.2)
BASOPHILS NFR BLD: 0.9 % (ref 0–1.9)
DIFFERENTIAL METHOD: ABNORMAL
EOSINOPHIL # BLD AUTO: 0.2 K/UL (ref 0–0.5)
EOSINOPHIL NFR BLD: 2.8 % (ref 0–8)
ERYTHROCYTE [DISTWIDTH] IN BLOOD BY AUTOMATED COUNT: 13.3 % (ref 11.5–14.5)
HCT VFR BLD AUTO: 45 % (ref 37–48.5)
HGB BLD-MCNC: 13.8 G/DL (ref 12–16)
IMM GRANULOCYTES # BLD AUTO: 0.03 K/UL (ref 0–0.04)
IMM GRANULOCYTES NFR BLD AUTO: 0.4 % (ref 0–0.5)
LYMPHOCYTES # BLD AUTO: 1.4 K/UL (ref 1–4.8)
LYMPHOCYTES NFR BLD: 20.1 % (ref 18–48)
MCH RBC QN AUTO: 30.3 PG (ref 27–31)
MCHC RBC AUTO-ENTMCNC: 30.7 G/DL (ref 32–36)
MCV RBC AUTO: 99 FL (ref 82–98)
MONOCYTES # BLD AUTO: 0.4 K/UL (ref 0.3–1)
MONOCYTES NFR BLD: 6.3 % (ref 4–15)
NEUTROPHILS # BLD AUTO: 4.8 K/UL (ref 1.8–7.7)
NEUTROPHILS NFR BLD: 69.5 % (ref 38–73)
NRBC BLD-RTO: 0 /100 WBC
PLATELET # BLD AUTO: 244 K/UL (ref 150–350)
PMV BLD AUTO: 11.6 FL (ref 9.2–12.9)
RBC # BLD AUTO: 4.56 M/UL (ref 4–5.4)
TSH SERPL DL<=0.005 MIU/L-ACNC: 1.67 UIU/ML (ref 0.4–4)
WBC # BLD AUTO: 6.83 K/UL (ref 3.9–12.7)

## 2020-03-10 PROCEDURE — 84443 ASSAY THYROID STIM HORMONE: CPT

## 2020-03-10 PROCEDURE — 82306 VITAMIN D 25 HYDROXY: CPT

## 2020-03-10 PROCEDURE — 80053 COMPREHEN METABOLIC PANEL: CPT

## 2020-03-10 PROCEDURE — 80061 LIPID PANEL: CPT

## 2020-03-10 PROCEDURE — 82607 VITAMIN B-12: CPT

## 2020-03-10 PROCEDURE — 36415 COLL VENOUS BLD VENIPUNCTURE: CPT | Mod: PO

## 2020-03-10 PROCEDURE — 85025 COMPLETE CBC W/AUTO DIFF WBC: CPT

## 2020-03-11 LAB
25(OH)D3+25(OH)D2 SERPL-MCNC: 85 NG/ML (ref 30–96)
ALBUMIN SERPL BCP-MCNC: 4.3 G/DL (ref 3.5–5.2)
ALP SERPL-CCNC: 92 U/L (ref 55–135)
ALT SERPL W/O P-5'-P-CCNC: 14 U/L (ref 10–44)
ANION GAP SERPL CALC-SCNC: 12 MMOL/L (ref 8–16)
AST SERPL-CCNC: 17 U/L (ref 10–40)
BILIRUB SERPL-MCNC: 0.4 MG/DL (ref 0.1–1)
BUN SERPL-MCNC: 13 MG/DL (ref 6–20)
CALCIUM SERPL-MCNC: 9.9 MG/DL (ref 8.7–10.5)
CHLORIDE SERPL-SCNC: 105 MMOL/L (ref 95–110)
CHOLEST SERPL-MCNC: 160 MG/DL (ref 120–199)
CHOLEST/HDLC SERPL: 2.8 {RATIO} (ref 2–5)
CO2 SERPL-SCNC: 27 MMOL/L (ref 23–29)
CREAT SERPL-MCNC: 0.9 MG/DL (ref 0.5–1.4)
EST. GFR  (AFRICAN AMERICAN): >60 ML/MIN/1.73 M^2
EST. GFR  (NON AFRICAN AMERICAN): >60 ML/MIN/1.73 M^2
GLUCOSE SERPL-MCNC: 97 MG/DL (ref 70–110)
HDLC SERPL-MCNC: 58 MG/DL (ref 40–75)
HDLC SERPL: 36.3 % (ref 20–50)
LDLC SERPL CALC-MCNC: 82.6 MG/DL (ref 63–159)
NONHDLC SERPL-MCNC: 102 MG/DL
POTASSIUM SERPL-SCNC: 3.8 MMOL/L (ref 3.5–5.1)
PROT SERPL-MCNC: 7.2 G/DL (ref 6–8.4)
SODIUM SERPL-SCNC: 144 MMOL/L (ref 136–145)
TRIGL SERPL-MCNC: 97 MG/DL (ref 30–150)
VIT B12 SERPL-MCNC: 294 PG/ML (ref 210–950)

## 2020-03-17 ENCOUNTER — PATIENT MESSAGE (OUTPATIENT)
Dept: INTERNAL MEDICINE | Facility: CLINIC | Age: 60
End: 2020-03-17

## 2020-03-23 RX ORDER — GABAPENTIN 600 MG/1
TABLET ORAL
Qty: 450 TABLET | Refills: 1 | Status: SHIPPED | OUTPATIENT
Start: 2020-03-23 | End: 2020-09-16

## 2020-03-24 ENCOUNTER — TELEPHONE (OUTPATIENT)
Dept: RADIOLOGY | Facility: HOSPITAL | Age: 60
End: 2020-03-24

## 2020-03-26 ENCOUNTER — PATIENT MESSAGE (OUTPATIENT)
Dept: PSYCHIATRY | Facility: CLINIC | Age: 60
End: 2020-03-26

## 2020-04-09 DIAGNOSIS — G35 MS (MULTIPLE SCLEROSIS): ICD-10-CM

## 2020-04-09 RX ORDER — SOLIFENACIN SUCCINATE 5 MG/1
10 TABLET, FILM COATED ORAL DAILY
Qty: 60 TABLET | Refills: 2 | Status: SHIPPED | OUTPATIENT
Start: 2020-04-09 | End: 2020-05-14

## 2020-05-14 DIAGNOSIS — G35 MS (MULTIPLE SCLEROSIS): ICD-10-CM

## 2020-05-14 RX ORDER — SOLIFENACIN SUCCINATE 5 MG/1
10 TABLET, FILM COATED ORAL DAILY
Qty: 30 TABLET | Refills: 0 | Status: SHIPPED | OUTPATIENT
Start: 2020-05-14 | End: 2020-06-10

## 2020-05-22 ENCOUNTER — PATIENT MESSAGE (OUTPATIENT)
Dept: RHEUMATOLOGY | Facility: CLINIC | Age: 60
End: 2020-05-22

## 2020-05-22 ENCOUNTER — PATIENT MESSAGE (OUTPATIENT)
Dept: OPTOMETRY | Facility: CLINIC | Age: 60
End: 2020-05-22

## 2020-05-25 DIAGNOSIS — H40.053 OCULAR HYPERTENSION, BILATERAL: ICD-10-CM

## 2020-05-25 RX ORDER — LATANOPROST 50 UG/ML
1 SOLUTION/ DROPS OPHTHALMIC NIGHTLY
Qty: 7.5 ML | Refills: 2 | Status: SHIPPED | OUTPATIENT
Start: 2020-05-25 | End: 2021-02-07

## 2020-05-26 ENCOUNTER — TELEPHONE (OUTPATIENT)
Dept: OPTOMETRY | Facility: CLINIC | Age: 60
End: 2020-05-26

## 2020-05-28 ENCOUNTER — OFFICE VISIT (OUTPATIENT)
Dept: PSYCHIATRY | Facility: CLINIC | Age: 60
End: 2020-05-28
Payer: COMMERCIAL

## 2020-05-28 DIAGNOSIS — F33.0 MDD (MAJOR DEPRESSIVE DISORDER), RECURRENT EPISODE, MILD: Primary | ICD-10-CM

## 2020-05-28 DIAGNOSIS — G35 MULTIPLE SCLEROSIS: ICD-10-CM

## 2020-05-28 DIAGNOSIS — F51.04 CHRONIC INSOMNIA: ICD-10-CM

## 2020-05-28 DIAGNOSIS — F34.1 DYSTHYMIC DISORDER: ICD-10-CM

## 2020-05-28 DIAGNOSIS — F41.1 GAD (GENERALIZED ANXIETY DISORDER): ICD-10-CM

## 2020-05-28 DIAGNOSIS — R53.82 CHRONIC FATIGUE: ICD-10-CM

## 2020-05-28 PROCEDURE — 90833 PSYTX W PT W E/M 30 MIN: CPT | Mod: 95,,, | Performed by: PSYCHIATRY & NEUROLOGY

## 2020-05-28 PROCEDURE — 90833 PR PSYCHOTHERAPY W/PATIENT W/E&M, 30 MIN (ADD ON): ICD-10-PCS | Mod: 95,,, | Performed by: PSYCHIATRY & NEUROLOGY

## 2020-05-28 PROCEDURE — 99213 OFFICE O/P EST LOW 20 MIN: CPT | Mod: 95,,, | Performed by: PSYCHIATRY & NEUROLOGY

## 2020-05-28 PROCEDURE — 99213 PR OFFICE/OUTPT VISIT, EST, LEVL III, 20-29 MIN: ICD-10-PCS | Mod: 95,,, | Performed by: PSYCHIATRY & NEUROLOGY

## 2020-05-28 RX ORDER — DEXTROAMPHETAMINE SACCHARATE, AMPHETAMINE ASPARTATE, DEXTROAMPHETAMINE SULFATE AND AMPHETAMINE SULFATE 5; 5; 5; 5 MG/1; MG/1; MG/1; MG/1
1 TABLET ORAL 2 TIMES DAILY
Qty: 60 TABLET | Refills: 0 | Status: SHIPPED | OUTPATIENT
Start: 2020-05-28 | End: 2021-04-29 | Stop reason: SDUPTHER

## 2020-05-28 RX ORDER — DEXTROAMPHETAMINE SACCHARATE, AMPHETAMINE ASPARTATE, DEXTROAMPHETAMINE SULFATE AND AMPHETAMINE SULFATE 5; 5; 5; 5 MG/1; MG/1; MG/1; MG/1
1 TABLET ORAL 2 TIMES DAILY
Qty: 60 TABLET | Refills: 0 | Status: SHIPPED | OUTPATIENT
Start: 2020-06-28 | End: 2021-04-29 | Stop reason: SDUPTHER

## 2020-05-28 NOTE — PROGRESS NOTES
"Outpatient Psychiatry Follow-Up Visit (MD/NP)  05/28/2020    Established Patient - Audio Only Telehealth Visit  Duration of phone services: 30 minutes    The patient location is: home ([place], [place])  The chief complaint leading to consultation is: chronic depression, anxiety  Visit type: Virtual visit with audio only (telephone)     The reason for the audio only service rather than synchronous audio and video virtual visit was related to technical difficulties or patient preference/necessity.     Each patient to whom I provide medical services by telemedicine is:  (1) informed of the relationship between the physician and patient and the respective role of any other health care provider with respect to management of the patient; and (2) notified that they may decline to receive medical services by telemedicine and may withdraw from such care at any time. Patient verbally consented to receive this service via voice-only telephone call.    This service was not originating from a related E/M service provided within the previous 7 days nor will  to an E/M service or procedure within the next 24 hours or my soonest available appointment.  Prevailing standard of care was able to be met in this audio-only visit.     Session Length:  30 minutes (E&M plus psychotherapy)     Clinical Status of Patient: Outpatient (Ambulatory)    Chief Complaint: Karen D Eleser is a 59 y.o. female who presents today for follow-up of chronic depression and anxiety.    Met with patient.     Interval History and Content of Current Session:  Interim Events/Subjective Report/Content of Current Session:  First f/u appt with me since 2/11/2020.  Pt had seen Dr. Tash Clark in the past; last appointment was on 9/28/2015 (this note was reviewed).      Plan from last session:  "Continue Cymbalta 120 mg daily.  Continue Wellbutrin  mg total daily.  Pt has denied Hx of seizures.     Continue Xanax 0.5 mg BID prn anxiety; can also use " "for sleep.    Also, continue Adderall immediate release 20 mg for treatment of fatigue/focus/concentration and treatment resistant depression.  I have recommended she take no > 40 mg max per day,  preferably less when not needed.  Three Rx for Adderall 20 mg, each for a 30 day supply with no refills & with "Do not fill until" dates posted accordingly, were given to patient.   Pt can take OTC Benadryl or melatonin for sleep.   Encourage individual psychotherapy for support. Patient had been meeting with LCSW in Neurology clinic; she can continue this (if insurance allows) or consider f/u with Dr. Nery Toledo or other therapist in our clinic."    She states she has been "OK", "about the same; some good days, some bad days".    She has chronic pain in her L knee (torn meniscus) -- needs to have surgery (scheduled on June 29, 2020 -- Dr. Vazquez at [facility] in [place], [place]).    She has arthritis in both knees.    She is limited due to pain.      She still takes a Zanaflex and Xanax to be able to fall and stay asleep.    Ambien caused AE's (see under "Allergies"); it also became less effective.    She has been isolating self at home.  The only significant change due to COVID was that she must wear a mask.      Current SIGECAPS:    Sleep -- often has problems falling asleep; she generally takes  She will tend to keep her bedside light on.    Interests -- low in general.  Still does not do much -- tends to stay at home, watch TV or read.      Guilt -- for "enabling my sister for so many years", not showing tough love.  Similar feelings with her mom (had to say "no" to her; feelings regarding circumstances surrounding her death).    Energy -- diminished    Concentration -- poor due to MS; has a hard time reading (Adderall helps)   Appetite -- "fine"   Psychomotor --  Somewhat decreased   Suicidal ideation -- denies       She will get depressed at times.     Will take Adderall -- it gives her energy, but also " "makes her feel jittery, especially when she takes an entire tablet.    She has NOT been taking the Adderall everyday, just when needed.       She continues to see her pain management specialist at [facility] on [address].    She has been using medical marijuana -- she takes it orally under her tongue.  "It seems to help some" with pain, except the shooting pain down her right leg.    She has chronic painful neuropathy in her feet, legs due to lumbar disc herniation.    She can use it up to twice a day.  She uses the tincture that she places under her tongue.    She also obtained the CBD oil (he also recommended this with the THC) that she also uses daily.    She states she has used less Tramadol as a result.      I encouraged pt to keep in touch with others (friends, relatives).      Past psych meds: celexa, lexapro, and effexor in the past.    From previous sessions:  Her father had endocarditis when he was younger.  He then had to have a pacemaker, then dual pacemaker/defibrillator.  He also had 3 sisters (her aunts) who all had heart disease (I did not asked if they smoked).  He had 3 older brothers who have been healthy.  She denies heart disease on her mother's side.    Her sister  from a massive MI at age 57 yo.  However, she abused pills and OTC meds and had gone to rehab at [facility] just months prior to her death.  She notes she and her sister were very close; they even lived together for a while.    Pt has denied ever having problems with her heart.  She stated she never had a stress test, but she has had "plenty of" EKG's.   --Pt had neuropsychological testing on 19 with Dr. Butt for interpretation.  It did not show anything significant besides some slowing of processing speed thought likely due to the MS.   She had been treated by Dr. Royce Pina for MS until his death in .  She has also been treated by other neurologists.  Dr. Maia Minaya is her neurologist " "currently.   --Regarding her mom and sister:  Mother passed: 2015. Sister passed: 2014. Sister had problems with drugs and alcohol, was a nurse and lost her nursing license.  She  from a massive MI while in the prison house after substance rehab.  She was close to them both.  She had enjoyed going to the movies, going out to eat, travel, etc.  However, she had always done these things with her sister, who  in 2014.  She has no one in her life that she is close to compared to her sister in the past.  She has a brother who is 6 years younger -- "he can be very abrasive, he talks real loud".  "He also has his life, his children".  She also notes he does not understand or appreciate her mental illness.   --Regarding past intimate relationships: She has had just one very serious relationship that lasted for 10 years.  She states the relationship ended because he lied to her & told her that he worked and stayed at his mother's house, caring for her.  However, he did not work, lived off his mother's SS, then was kicked out of the mom's trailer by his siblings after she , so he was homeless for a while.  She thinks he may be living with his ex-wife now.  She states this happened about 5 years ago.    Since then, she had met 2 guys on line ([employer]) -- went out with each of them a few times, but nothing ever came of this.    She notes her mother was very controlling.  Pt also was shy growing up.  Pt denies ever being sexually molested.  She denies ever having problems with alcohol or drugs.  She is out on disability from work ([employer]) as a .    She is still getting a check through her work disability co.     PSYCHOTHERAPY ADD-ON [id number]   30 (16-37*) minutes   · Target symptoms: depression, anxiety, fatigue  · Why chosen therapy is appropriate versus another modality: relevant to diagnosis, patient responds to this modality  · Outcome monitoring methods: " self-report  · Therapeutic intervention type: supportive psychotherapy  · Topics discussed/themes: SEE ABOVE-- illness/death of loved ones, stress related to medical comorbidities, life stage transitional issues, social isolation, self care/nurturing.  · The patient's response to the intervention is accepting.   · The patient's progress toward treatment goals is fair.  · Duration of intervention: 18 minutes      Review of Systems   · PSYCHIATRIC: Pertinant items are noted in the narrative.  · CONSTITUTIONAL: Some recent wt fluctuations.  Chronic fatigue.     · MUSCULOSKELETAL:  Chronic generalized aches/pains.   · NEUROLOGIC: Some occasional mild numbness and tingling in her arms.  + chronic nuropathic pain in her feet/legs.  No weakness, seizures, confusion, memory loss, tremor or other abnormal movements.  · ENDOCRINE: No polydipsia or polyuria.  · INTEGUMENTARY: No rashes or lacerations.  · EYES: No exophthalmos, jaundice or blindness.  · ENT: No dizziness, tinnitus or hearing loss.  · RESPIRATORY: No shortness of breath.  · CARDIOVASCULAR: No tachycardia or chest pain.  · GASTROINTESTINAL: No nausea, vomiting, pain, constipation or diarrhea.  · GENITOURINARY: No frequency, dysuria.        Past Medical, Family and Social History: The patient's past medical, family and social history have been reviewed and updated as appropriate within the electronic medical record -- see encounter notes.       Current Outpatient Medications:     ALPRAZolam (XANAX) 0.5 MG tablet, Take 1 tablet (0.5 mg total) by mouth 2 (two) times daily., Disp: 60 tablet, Rfl: 5    b complex vitamins (VITAMINS B COMPLEX) tablet, Take by mouth. 1 Tablet Oral Every day, Disp: , Rfl:     buPROPion (WELLBUTRIN XL) 300 MG 24 hr tablet, Take 1 tablet (300 mg total) by mouth once daily. Take with food., Disp: 90 tablet, Rfl: 3    cholecalciferol, vitamin D3, (VITAMIN D3) 5,000 unit Tab, Take 5,000 Units by mouth once daily. , Disp: , Rfl:      coenzyme Q10 (CO Q-10) 400 mg Cap, Take by mouth., Disp: , Rfl:     dextroamphetamine-amphetamine (ADDERALL) 20 mg tablet, Take 1 tablet by mouth 2 (two) times daily. (Patient not taking: Reported on 10/30/2019), Disp: 60 tablet, Rfl: 0    dextroamphetamine-amphetamine (ADDERALL) 20 mg tablet, Take 1 tablet by mouth 2 (two) times daily., Disp: 60 tablet, Rfl: 0    dextroamphetamine-amphetamine (ADDERALL) 20 mg tablet, Take 1 tablet by mouth 2 (two) times daily., Disp: 60 tablet, Rfl: 0    diclofenac sodium (VOLTAREN) 1 % Gel, Apply 2 g topically 4 (four) times daily., Disp: 5 Tube, Rfl: 0    DULoxetine (CYMBALTA) 60 MG capsule, Take 2 capsules (120 mg total) by mouth once daily., Disp: 180 capsule, Rfl: 3    FLUZONE QUAD 5025-0267, PF, 60 mcg (15 mcg x 4)/0.5 mL Syrg, TO BE ADMINISTERED BY PHARMACIST FOR IMMUNIZATION, Disp: , Rfl: 0    gabapentin (NEURONTIN) 600 MG tablet, TAKE 2 TABLETS EVERY MORNING, 1 TABLET AT NOON AND 2 TABLETS AT BEDTIME, Disp: 450 tablet, Rfl: 1    glatiramer (COPAXONE, GLATOPA) 40 mg/mL injection, INJECT ONE SYRINGE (40 MG) SUBCUTANEOUSLY 3 TIMES A WEEK AT LEAST 48 HOURS APART. ALLOW TO WARM TO ROOM TEMP FOR 20 MINUTES. REFRIGERATE., Disp: 36 Syringe, Rfl: 1    HYDROcodone-acetaminophen (NORCO) 5-325 mg per tablet, Take 1 tablet by mouth 3 (three) times daily as needed., Disp: , Rfl: 0    ketoconazole (XOLEGEL) 2 % Gel, Apply topically., Disp: , Rfl:     latanoprost 0.005 % ophthalmic solution, Place 1 drop into both eyes every evening., Disp: 7.5 mL, Rfl: 2    meloxicam (MOBIC) 15 MG tablet, TAKE 1 TABLET BY MOUTH EVERY DAY, Disp: 90 tablet, Rfl: 6    meloxicam (MOBIC) 15 MG tablet, TAKE 1 TABLET BY MOUTH EVERY DAY, Disp: 90 tablet, Rfl: 3    multivitamin (THERAGRAN) per tablet, Take by mouth. 1 Tablet Oral Every day, Disp: , Rfl:     PREVIDENT 5000 SENSITIVE 1.1-5 % Pste, Apply topically once daily. , Disp: , Rfl:     rosuvastatin (CRESTOR) 10 MG tablet, TAKE 1 TABLET (10  "MG TOTAL) BY MOUTH EVERY EVENING., Disp: 90 tablet, Rfl: 1    solifenacin (VESICARE) 5 MG tablet, TAKE 2 TABLETS (10 MG TOTAL) BY MOUTH ONCE DAILY., Disp: 30 tablet, Rfl: 0    tiZANidine (ZANAFLEX) 2 MG tablet, TAKE 1/2 TABLET BY MOUTH TWICE A DAY THEN 2 TABS AT BEDTIME AS NEEDED, Disp: 270 tablet, Rfl: 1    traMADol (ULTRAM) 50 mg tablet, Take 50 mg by mouth 4 (four) times daily., Disp: , Rfl: 1    She generally takes the Xanax at night, minimal during the day.    She takes Adderall when needed, not necessarily everyday.  Gabapentin is Rx by her pain management provider.    Compliance: yes    Side effects: Ambien -- visual hallucinations and illusions, sleepwalking/sleepeating.     Risk Parameters:  Patient reports no suicidal ideation  Patient reports no homicidal ideation  Patient reports no self-injurious behavior  Patient reports no violent behavior    Exam (detailed: at least 9 elements; comprehensive: all 15 elements)    Constitutional  Vitals:  Most recent vital signs, dated less than 90 days prior to this appointment, were reviewed:   NO VITALS DONE TODAY -- PHONE SESSION ONLY    Vitals - 1 value per visit 10/1/2019 10/16/2019 10/30/2019 2/11/2020   SYSTOLIC 123 131 99 143   DIASTOLIC 85 79 65 82   PULSE 78 91 70 84   TEMPERATURE   98.2    SPO2       Weight (lb) 153.22 153 151 154.1   Weight (kg) 69.5 69.4 68.493 69.9   HEIGHT 5' 0" 5' 0" 5' 0" 5' 0"   BODY MASS INDEX 29.92 29.88 29.49 30.1   VISIT REPORT       Pain Score  7  6        Vitals - 1 value per visit 2/22/2019 3/15/2019 6/20/2019   SYSTOLIC 135 139 165   DIASTOLIC 86 88 88   PULSE 83 96 69   SPO2      Weight (lb) 154  156.64   Weight (kg) 69.854  71.05   HEIGHT 5' 0" 5' 0" 5' 0"   BODY MASS INDEX 30.08 30.08 30.59   VISIT REPORT      Pain Score  5 7        Vitals - 1 value per visit 3/5/2018 3/13/2018 5/7/2018 2/21/2019   SYSTOLIC 138  154 139   DIASTOLIC 80  81 80   PULSE 79  95 83   RESPIRATIONS       SPO2 98      Weight (lb) 161.38  158.95 " "154.76   Weight (kg) 73.2  72.1 70.2   HEIGHT 5' 0"  5' 0" 5' 0"   BODY MASS INDEX 31.52  31.04 30.23   VISIT REPORT       Pain Score  0 0          General:  N/A (phone session only)     Musculoskeletal  Muscle Strength/Tone:  N/A (phone session only)   Gait & Station:  N/A (phone session only)     Psychiatric  Speech:  normal tone, normal rate, normal pitch, normal volume, spontaneous    Mood & Affect:  "OK"  congruent and appropriate to situation/content.    Thought Process:  normal and logical, mildly circumstantial   Associations:  intact    Thought Content:  normal, no suicidality, no homicidality, delusions, or paranoia    Insight & Judgement:  good, intact  adequate to circumstances, age appropriate, good    Orientation:  grossly intact, person, place, time/date, situation    Memory:  intact for content of interview, grossly intact    Language:  grossly intact    Attention Span & Concentration:  able to focus    Fund of Knowledge:  intact and appropriate to age and level of education, familiar with aspects of current personal life      Prior EKG:  Test Reason : Z79.899  Vent. Rate : 077 BPM     Atrial Rate : 077 BPM     P-R Int : 136 ms          QRS Dur : 090 ms      QT Int : 406 ms       P-R-T Axes : 033 000 073 degrees     QTc Int : 459 ms  Normal sinus rhythm  Poor R wave progression  Abnormal ECG  When compared with ECG of 21-JUL-2008 08:45,  Nonspecific T wave abnormality no longer evident in Inferior leads  QT has lengthened  Confirmed by Marty VEE, Yaneli ([id number]) on 8/11/2017 4:36:19 PM      Assessment and Diagnosis    Status/Progress: Based on the examination today, the patient's problem(s) is/are adequately, but not ideally controlled. New problems have not been presented today. Comorbidities (chronic neuropathic pain) are complicating management of the primary condition. There are no active rule-out diagnoses for this patient at this time.    General Impression:   Major Depressive Disorder, " "Recurrent: Mild  Dysthymic Disorder   Generalized Anxiety Disorder  Chronic Insomnia   Multiple Sclerosis (causes cognitive difficulties)   Also: chronic pain, CHELSEA    Intervention/Counseling/Treatment Plan    Continue all current meds: Continue Cymbalta 120 mg daily  Continue Wellbutrin  mg total daily.  Pt has denied Hx of seizures.     Continue Xanax 0.5 mg BID prn anxiety; can also use for sleep.  Pt needed no refills for any meds today besides Adderall.  Also, continue Adderall immediate release 20 mg for treatment of fatigue/focus/concentration and treatment resistant depression.  I have recommended she take no > 40 mg max per day,  preferably less when not needed.  Two Rx for Adderall 20 mg, each for a 30 day supply with no refills & with "Do not fill until" dates posted accordingly, were mailed to patient (address on file in EMR was checked with pt prior to mailing).     Pt can take OTC Benadryl or melatonin for sleep.   Encourage individual psychotherapy for support. Patient had been meeting with LCSW in Neurology clinic; she can continue this (if insurance allows) or consider f/u with Dr. Nery Toledo or other therapist in our clinic.        Additional Notes:  N/A      Return to Clinic: 3 - 4 months, or sooner prn.  "

## 2020-06-19 ENCOUNTER — HOSPITAL ENCOUNTER (EMERGENCY)
Facility: HOSPITAL | Age: 60
Discharge: PSYCHIATRIC OR SUBSTANCE USE FACILITY | End: 2020-06-19
Attending: EMERGENCY MEDICINE
Payer: COMMERCIAL

## 2020-06-19 VITALS
RESPIRATION RATE: 20 BRPM | DIASTOLIC BLOOD PRESSURE: 73 MMHG | HEART RATE: 84 BPM | SYSTOLIC BLOOD PRESSURE: 140 MMHG | TEMPERATURE: 99 F | OXYGEN SATURATION: 100 %

## 2020-06-19 DIAGNOSIS — S82.839A CLOSED FRACTURE OF DISTAL END OF FIBULA, UNSPECIFIED FRACTURE MORPHOLOGY, INITIAL ENCOUNTER: Primary | ICD-10-CM
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
## 2020-06-19 PROCEDURE — 99285 EMERGENCY DEPT VISIT HI MDM: CPT | Mod: 25

## 2020-06-20 NOTE — ED PROVIDER NOTES
Encounter Date: 6/19/2020       History     Chief Complaint   Patient presents with    Foot Injury     pt presents to ED today via EMs from [facility]. Pt reports she fell a few days ago and injured right foot, pt was seen at [place] prior to admission to [facility] and reports no xrays were takne until she arrived at [facility]. Pt arrives on PEC and is answering questions appropriately      Patient is a 59 year old female who complains of right foot pain and swelling.  Patient says she stumbled few days ago she was walking to the bathroom at home.  She did not fall to the floor.  She is currently in [facility] and was sent here evaluation her foot.        Review of patient's allergies indicates:   Allergen Reactions    Ambien [zolpidem] Other (See Comments)     Hallucinations, illusions, sleepwalking/sleepeating    Levaquin  [levofloxacin]      Other reaction(s): Swelling  Other reaction(s): Hives    Sonata [zaleplon] Other (See Comments)     Visual hallucinations     Past Medical History:   Diagnosis Date    Arthritis     Chronic insomnia 4/14/2016    DDD (degenerative disc disease), lumbar 7/23/2013    Depression     Dysthymic disorder 3/30/2015    Fatigue     Fibromyalgia     LIZZETH (generalized anxiety disorder) 4/14/2016    Hyperlipidemia     Major depressive disorder, recurrent, moderate 4/14/2016    Multiple sclerosis      Past Surgical History:   Procedure Laterality Date    BELT ABDOMINOPLASTY      BREAST REDUCTION      CHOLECYSTECTOMY      HYSTERECTOMY  08/11/2008    [facility] (endometriosis / fibroids) BC    JOINT REPLACEMENT      LAPAROSCOPIC GASTRIC BANDING      Vaginal cuff revision       Family History   Problem Relation Age of Onset    Fibromyalgia Mother     Depression Mother     Heart disease Father     Drug abuse Sister     Depression Sister     Anxiety disorder Sister     Alcohol abuse Brother     Breast cancer Neg Hx     Colon cancer Neg Hx      Ovarian cancer Neg Hx      Social History     Tobacco Use    Smoking status: Never Smoker    Smokeless tobacco: Never Used   Substance Use Topics    Alcohol use: Yes     Comment: rare    Drug use: No     Review of Systems   Constitutional: Negative for fever.   Respiratory: Negative for cough.    Musculoskeletal:        Right foot pain.   Neurological: Negative for numbness.   All other systems reviewed and are negative.      Physical Exam     Initial Vitals [06/19/20 1857]   BP Pulse Resp Temp SpO2   (!) 161/86 74 20 98.1 °F (36.7 °C) 96 %      MAP       --         Physical Exam    Nursing note and vitals reviewed.  Constitutional: No distress.   HENT:   Head: Atraumatic.   Eyes: EOM are normal.   Neck: Normal range of motion. Neck supple.   Cardiovascular: Normal rate, regular rhythm and normal heart sounds.   Pulmonary/Chest: Breath sounds normal.   Musculoskeletal:      Comments: There is diffuse swelling of right foot with bruising of both lateral aspects.  There is tenderness across the dorsum of the foot.  Palpable DP and PT pulses.  No sensory deficits.   Neurological: She is alert and oriented to person, place, and time.   Skin: Skin is warm and dry.   Psychiatric: Her behavior is normal.         ED Course   Procedures  Labs Reviewed - No data to display       Imaging Results          X-Ray Ankle Complete Right (Final result)  Result time 06/19/20 19:43:20    Final result by Cherelle Mercado MD (06/19/20 19:43:20)                 Impression:      Acute distal fibular fracture.      Electronically signed by: Cherelle Mercado MD  Date:    06/19/2020  Time:    19:43             Narrative:    EXAMINATION:  XR ANKLE COMPLETE 3 VIEW RIGHT; XR FOOT COMPLETE 3 VIEW RIGHT    CLINICAL HISTORY:  Unspecified fall, initial encounter    TECHNIQUE:  AP, lateral, and oblique images of the right ankle were performed.  Right foot three views.    COMPARISON:  None    FINDINGS:  Acute mild displaced fracture seen of the  distal fibular shaft.  Soft tissue swelling is seen about the ankle and dorsum of the foot.  Ankle mortise is maintained.  Lisfranc articulation appears congruent.  There is plantar calcaneal spurring.                               X-Ray Foot Complete Right (Final result)  Result time 06/19/20 19:43:20    Final result by Cherelle Mercado MD (06/19/20 19:43:20)                 Impression:      Acute distal fibular fracture.      Electronically signed by: Cherelle Mercado MD  Date:    06/19/2020  Time:    19:43             Narrative:    EXAMINATION:  XR ANKLE COMPLETE 3 VIEW RIGHT; XR FOOT COMPLETE 3 VIEW RIGHT    CLINICAL HISTORY:  Unspecified fall, initial encounter    TECHNIQUE:  AP, lateral, and oblique images of the right ankle were performed.  Right foot three views.    COMPARISON:  None    FINDINGS:  Acute mild displaced fracture seen of the distal fibular shaft.  Soft tissue swelling is seen about the ankle and dorsum of the foot.  Ankle mortise is maintained.  Lisfranc articulation appears congruent.  There is plantar calcaneal spurring.                                 Medical Decision Making:   ED Management:  59-year-old female [facility] for evaluation of a right foot injury.  X-ray shows a distal fibula fracture.  I will place her in a walking boot.  Patient says she has an orthopedic doctor to follow up with.                                 Clinical Impression:       ICD-10-CM ICD-9-CM   1. Closed fracture of distal end of fibula, unspecified fracture morphology, initial encounter  S82.839A 824.8   2. Fall  W19.XXXA E888.9                                Jorge Peters MD  06/19/20 1617

## 2020-06-20 NOTE — ED NOTES
Report given to Neelima at [facility] psych.. pt. Is resting quietly without distress. Awaiting transport.

## 2020-06-20 NOTE — ED NOTES
Presents to ED via EMS from [facility] with PEC order. Care sitter at bedside. Pt reports tripping over furniture either 4 or 5 day ago. Right foot with noted edema, purple bruising to base of toes and along both sides of her foot. Right leg is warm to touch. Denies hitting her head or LOC. NO acute distress at this time.

## 2020-06-24 ENCOUNTER — PATIENT OUTREACH (OUTPATIENT)
Dept: ADMINISTRATIVE | Facility: HOSPITAL | Age: 60
End: 2020-06-24

## 2020-06-24 DIAGNOSIS — Z11.59 NEED FOR HEPATITIS C SCREENING TEST: Primary | ICD-10-CM

## 2020-09-01 ENCOUNTER — OFFICE VISIT (OUTPATIENT)
Dept: PSYCHIATRY | Facility: CLINIC | Age: 60
End: 2020-09-01
Payer: COMMERCIAL

## 2020-09-01 DIAGNOSIS — F33.0 MDD (MAJOR DEPRESSIVE DISORDER), RECURRENT EPISODE, MILD: Primary | ICD-10-CM

## 2020-09-01 DIAGNOSIS — F34.1 DYSTHYMIC DISORDER: ICD-10-CM

## 2020-09-01 DIAGNOSIS — F41.1 GENERALIZED ANXIETY DISORDER: ICD-10-CM

## 2020-09-01 DIAGNOSIS — F51.04 CHRONIC INSOMNIA: ICD-10-CM

## 2020-09-01 DIAGNOSIS — F41.1 GAD (GENERALIZED ANXIETY DISORDER): ICD-10-CM

## 2020-09-01 PROCEDURE — 99214 PR OFFICE/OUTPT VISIT, EST, LEVL IV, 30-39 MIN: ICD-10-PCS | Mod: 95,,, | Performed by: PSYCHIATRY & NEUROLOGY

## 2020-09-01 PROCEDURE — 99214 OFFICE O/P EST MOD 30 MIN: CPT | Mod: 95,,, | Performed by: PSYCHIATRY & NEUROLOGY

## 2020-09-01 RX ORDER — ALPRAZOLAM 0.5 MG/1
0.5 TABLET ORAL 2 TIMES DAILY
Qty: 60 TABLET | Refills: 5 | Status: SHIPPED | OUTPATIENT
Start: 2020-09-01 | End: 2021-04-29 | Stop reason: SDUPTHER

## 2020-09-01 RX ORDER — QUETIAPINE FUMARATE 50 MG/1
50 TABLET, FILM COATED ORAL NIGHTLY
Qty: 30 TABLET | Refills: 3 | Status: SHIPPED | OUTPATIENT
Start: 2020-09-01 | End: 2020-12-07

## 2020-09-01 NOTE — PROGRESS NOTES
"Outpatient Psychiatry Follow-Up Visit (MD/NP) -- telemedicine visit  09/01/2020    The patient location is: home  The chief complaint leading to consultation is: see below    Visit type: audiovisual    Face to Face time with patient: 20 minutes  30 minutes of total time spent on the encounter, which includes face to face time and non-face to face time preparing to see the patient (eg, review of tests), Obtaining and/or reviewing separately obtained history, Documenting clinical information in the electronic or other health record, Independently interpreting results (not separately reported) and communicating results to the patient/family/caregiver, or Care coordination (not separately reported).     Each patient to whom he or she provides medical services by telemedicine is:  (1) informed of the relationship between the physician and patient and the respective role of any other health care provider with respect to management of the patient; and (2) notified that he or she may decline to receive medical services by telemedicine and may withdraw from such care at any time.      Session Length:  30 minutes (E&M)     Clinical Status of Patient: Outpatient (Ambulatory)    Chief Complaint: Karen D Eleser is a 59 y.o. female who presents today for follow-up of chronic depression and anxiety.    Met with patient.     Interval History and Content of Current Session:  Interim Events/Subjective Report/Content of Current Session:  First f/u appt with me since 5/28/2020.  Pt had seen Dr. Tash Clark in the past; last appointment was on 9/28/2015 (this note was reviewed).      Plan from last session:  "Continue Cymbalta 120 mg daily  Continue Wellbutrin  mg total daily.  Pt has denied Hx of seizures.     Continue Xanax 0.5 mg BID prn anxiety; can also use for sleep.  Pt needed no refills for any meds today besides Adderall.  Also, continue Adderall immediate release 20 mg for treatment of fatigue/focus/concentration and " "treatment resistant depression.  I have recommended she take no > 40 mg max per day,  preferably less when not needed.  Two Rx for Adderall 20 mg, each for a 30 day supply with no refills & with "Do not fill until" dates posted accordingly, were mailed to patient (address on file in EMR was checked with pt prior to mailing).     Pt can take OTC Benadryl or melatonin for sleep.   Encourage individual psychotherapy for support. Patient had been meeting with LCSW in Neurology clinic; she can continue this (if insurance allows) or consider f/u with Dr. Nery Toledo or other therapist in our clinic."    Doing "OK" currently.      Tells me about being admitted to an inpatient psych facility in interim between appts.  Spent several days there.  No additional collateral is available, including copies of records from the inpatient psych facility.  She states she went to [place] for a "broken leg", then ended up going to the psych hospital because she had "ecstasy" in her UDS (she states she has NEVER used this drug -- I have no reason to disbelieve her).  She was taken off her psych meds and placed on other meds, including Lexapro, Buspar and Seroquel.  Xanax was discontinued.  After discharge, pt decided to stop the meds she had taken in the hospital and restart her previous meds that I Rx for her.    However, she did find the Seroquel 50 mg tablet very helpful for sleep and would like to restart this med at this dosage.  She denied AE's to the Seroquel.      She still has chronic pain in her legs/knees -- takes Tramadol for severe pain.    She also resumed Xanax 0.5 mg BID.      She still self-isolates at home, leaves house infrequently.  The only significant change due to COVID was that she must wear a mask in public places.      Current SIGECAPS:    Sleep -- often has problems falling asleep; she generally takes  She will tend to keep her bedside light on.    Interests -- low in general.  Still does not do much -- " "tends to stay at home, watch TV or read.      Guilt -- for "enabling my sister for so many years", not showing tough love.  Similar feelings with her mom (had to say "no" to her; feelings regarding circumstances surrounding her death).    Energy -- diminished    Concentration -- poor due to MS; has a hard time reading (Adderall helps)   Appetite -- "fine"   Psychomotor --  Somewhat decreased   Suicidal ideation -- denies       She will get depressed at times.     Will take Adderall -- it gives her energy, but also makes her feel jittery, especially when she takes an entire tablet.    She has NOT been taking the Adderall everyday, just when needed.       She continues to see her pain management specialist at [facility] on [address].    She has been using medical marijuana -- she takes it orally under her tongue.  "It seems to help some" with pain, except the shooting pain down her right leg.    She has chronic painful neuropathy in her feet, legs due to lumbar disc herniation.    She can use it up to twice a day.  She uses the tincture that she places under her tongue.    She also obtained the CBD oil (he also recommended this with the THC) that she also uses daily.    She states she has used less Tramadol as a result.      I encouraged pt to keep in touch with others (friends, relatives).      Past psych meds: celexa, lexapro, and effexor in the past.    From previous sessions:  Her father had endocarditis when he was younger.  He then had to have a pacemaker, then dual pacemaker/defibrillator.  He also had 3 sisters (her aunts) who all had heart disease (I did not asked if they smoked).  He had 3 older brothers who have been healthy.  She denies heart disease on her mother's side.    Her sister  from a massive MI at age 57 yo.  However, she abused pills and OTC meds and had gone to rehab at [place] just months prior to her death.  She notes she and her sister were very close; they even " "lived together for a while.    Pt has denied ever having problems with her heart.  She stated she never had a stress test, but she has had "plenty of" EKG's.   --Pt had neuropsychological testing on 19 with Dr. Butt for interpretation.  It did not show anything significant besides some slowing of processing speed thought likely due to the MS.   She had been treated by Dr. Royce Pina for MS until his death in .  She has also been treated by other neurologists.  Dr. Maia Minaya is her neurologist currently.   --Regarding her mom and sister:  Mother passed: 2015. Sister passed: 2014. Sister had problems with drugs and alcohol, was a nurse and lost her nursing license.  She  from a massive MI while in the assisted house after substance rehab.  She was close to them both.  She had enjoyed going to the movies, going out to eat, travel, etc.  However, she had always done these things with her sister, who  in 2014.  She has no one in her life that she is close to compared to her sister in the past.  She has a brother who is 6 years younger -- "he can be very abrasive, he talks real loud".  "He also has his life, his children".  She also notes he does not understand or appreciate her mental illness.   --Regarding past intimate relationships: She has had just one very serious relationship that lasted for 10 years.  She states the relationship ended because he lied to her & told her that he worked and stayed at his mother's house, caring for her.  However, he did not work, lived off his mother's SS, then was kicked out of the mom's trailer by his siblings after she , so he was homeless for a while.  She thinks he may be living with his ex-wife now.  She states this happened about 5 years ago.    Since then, she had met 2 guys on line ([employer]) -- went out with each of them a few times, but nothing ever came of this.    She notes her mother was very controlling.  Pt also was shy " growing up.  Pt denies ever being sexually molested.  She denies ever having problems with alcohol or drugs.  She is out on disability from work ([employer]) as a .    She is still getting a check through her work disability co.     PSYCHOTHERAPY ADD-ON [phone]   30 (16-37*) minutes   · Target symptoms: depression, anxiety, fatigue  · Why chosen therapy is appropriate versus another modality: relevant to diagnosis, patient responds to this modality  · Outcome monitoring methods: self-report  · Therapeutic intervention type: supportive psychotherapy  · Topics discussed/themes: SEE ABOVE-- illness/death of loved ones, stress related to medical comorbidities, life stage transitional issues, social isolation, self care/nurturing.  · The patient's response to the intervention is accepting.   · The patient's progress toward treatment goals is fair.  · Duration of intervention: 18 minutes      Review of Systems   · PSYCHIATRIC: Pertinant items are noted in the narrative.  · CONSTITUTIONAL: Some recent wt fluctuations.  Chronic fatigue.     · MUSCULOSKELETAL:  Chronic generalized aches/pains.   · NEUROLOGIC: Some occasional mild numbness and tingling in her arms.  + chronic nuropathic pain in her feet/legs.  No weakness, seizures, confusion, memory loss, tremor or other abnormal movements.  · ENDOCRINE: No polydipsia or polyuria.  · INTEGUMENTARY: No rashes or lacerations.  · EYES: No exophthalmos, jaundice or blindness.  · ENT: No dizziness, tinnitus or hearing loss.  · RESPIRATORY: No shortness of breath.  · CARDIOVASCULAR: No tachycardia or chest pain.  · GASTROINTESTINAL: No nausea, vomiting, pain, constipation or diarrhea.  · GENITOURINARY: No frequency, dysuria.        Past Medical, Family and Social History: The patient's past medical, family and social history have been reviewed and updated as appropriate within the electronic medical record -- see encounter notes.       Current Outpatient  Medications:     ALPRAZolam (XANAX) 0.5 MG tablet, Take 1 tablet (0.5 mg total) by mouth 2 (two) times daily., Disp: 60 tablet, Rfl: 5    b complex vitamins (VITAMINS B COMPLEX) tablet, Take by mouth. 1 Tablet Oral Every day, Disp: , Rfl:     buPROPion (WELLBUTRIN XL) 300 MG 24 hr tablet, Take 1 tablet (300 mg total) by mouth once daily. Take with food., Disp: 90 tablet, Rfl: 3    cholecalciferol, vitamin D3, (VITAMIN D3) 5,000 unit Tab, Take 5,000 Units by mouth once daily. , Disp: , Rfl:     coenzyme Q10 (CO Q-10) 400 mg Cap, Take by mouth., Disp: , Rfl:     dextroamphetamine-amphetamine (ADDERALL) 20 mg tablet, Take 1 tablet by mouth 2 (two) times daily., Disp: 60 tablet, Rfl: 0    dextroamphetamine-amphetamine (ADDERALL) 20 mg tablet, Take 1 tablet by mouth 2 (two) times daily., Disp: 60 tablet, Rfl: 0    diclofenac sodium (VOLTAREN) 1 % Gel, Apply 2 g topically 4 (four) times daily., Disp: 5 Tube, Rfl: 0    DULoxetine (CYMBALTA) 60 MG capsule, Take 2 capsules (120 mg total) by mouth once daily., Disp: 180 capsule, Rfl: 3    FLUZONE QUAD 3980-0693, PF, 60 mcg (15 mcg x 4)/0.5 mL Syrg, TO BE ADMINISTERED BY PHARMACIST FOR IMMUNIZATION, Disp: , Rfl: 0    gabapentin (NEURONTIN) 600 MG tablet, TAKE 2 TABLETS EVERY MORNING, 1 TABLET AT NOON AND 2 TABLETS AT BEDTIME, Disp: 450 tablet, Rfl: 1    glatiramer (COPAXONE, GLATOPA) 40 mg/mL injection, INJECT ONE SYRINGE (40 MG) SUBCUTANEOUSLY 3 TIMES A WEEK AT LEAST 48 HOURS APART. ALLOW TO WARM TO ROOM TEMP FOR 20 MINUTES. REFRIGERATE., Disp: 36 Syringe, Rfl: 1    HYDROcodone-acetaminophen (NORCO) 5-325 mg per tablet, Take 1 tablet by mouth 3 (three) times daily as needed., Disp: , Rfl: 0    ketoconazole (XOLEGEL) 2 % Gel, Apply topically., Disp: , Rfl:     latanoprost 0.005 % ophthalmic solution, Place 1 drop into both eyes every evening., Disp: 7.5 mL, Rfl: 2    meloxicam (MOBIC) 15 MG tablet, TAKE 1 TABLET BY MOUTH EVERY DAY, Disp: 90 tablet, Rfl: 6     "meloxicam (MOBIC) 15 MG tablet, TAKE 1 TABLET BY MOUTH EVERY DAY, Disp: 90 tablet, Rfl: 3    multivitamin (THERAGRAN) per tablet, Take by mouth. 1 Tablet Oral Every day, Disp: , Rfl:     PREVIDENT 5000 SENSITIVE 1.1-5 % Pste, Apply topically once daily. , Disp: , Rfl:     rosuvastatin (CRESTOR) 10 MG tablet, TAKE 1 TABLET BY MOUTH EVERY DAY IN THE EVENING, Disp: 90 tablet, Rfl: 1    solifenacin (VESICARE) 10 MG tablet, TAKE 1 TABLET BY MOUTH EVERY DAY, Disp: 90 tablet, Rfl: 1    tiZANidine (ZANAFLEX) 2 MG tablet, TAKE 1/2 TABLET BY MOUTH TWICE A DAY THEN 2 TABS AT BEDTIME AS NEEDED, Disp: 270 tablet, Rfl: 0    traMADol (ULTRAM) 50 mg tablet, Take 50 mg by mouth 4 (four) times daily., Disp: , Rfl: 1    She generally takes the Xanax at night, minimal during the day.    She takes Adderall when needed, not necessarily everyday.  Gabapentin is Rx by her pain management provider.    Compliance: yes    Side effects: Ambien -- visual hallucinations and illusions, sleepwalking/sleepeating.     Risk Parameters:  Patient reports no suicidal ideation  Patient reports no homicidal ideation  Patient reports no self-injurious behavior  Patient reports no violent behavior    Exam (detailed: at least 9 elements; comprehensive: all 15 elements)    Constitutional  Vitals:  Most recent vital signs, dated less than 90 days prior to this appointment, were reviewed:   NO VITALS DONE TODAY -- VIRTUAL SESSION ONLY    Vitals - 1 value per visit 10/1/2019 10/16/2019 10/30/2019 2/11/2020   SYSTOLIC 123 131 99 143   DIASTOLIC 85 79 65 82   PULSE 78 91 70 84   TEMPERATURE   98.2    SPO2       Weight (lb) 153.22 153 151 154.1   Weight (kg) 69.5 69.4 68.493 69.9   HEIGHT 5' 0" 5' 0" 5' 0" 5' 0"   BODY MASS INDEX 29.92 29.88 29.49 30.1   VISIT REPORT       Pain Score  7  6        Vitals - 1 value per visit 2/22/2019 3/15/2019 6/20/2019   SYSTOLIC 135 139 165   DIASTOLIC 86 88 88   PULSE 83 96 69   SPO2      Weight (lb) 154  156.64   Weight " "(kg) 69.854  71.05   HEIGHT 5' 0" 5' 0" 5' 0"   BODY MASS INDEX 30.08 30.08 30.59   VISIT REPORT      Pain Score  5 7        Vitals - 1 value per visit 3/5/2018 3/13/2018 5/7/2018 2/21/2019   SYSTOLIC 138  154 139   DIASTOLIC 80  81 80   PULSE 79  95 83   RESPIRATIONS       SPO2 98      Weight (lb) 161.38  158.95 154.76   Weight (kg) 73.2  72.1 70.2   HEIGHT 5' 0"  5' 0" 5' 0"   BODY MASS INDEX 31.52  31.04 30.23   VISIT REPORT       Pain Score  0 0          General:  unremarkable, age appropriate, well dressed, neatly groomed     Musculoskeletal  Muscle Strength/Tone:  N/A (virtual session only)   Gait & Station:  N/A (virtual session only)     Psychiatric  Speech:  normal tone, normal rate, normal pitch, normal volume, spontaneous    Mood & Affect:  "OK"  congruent and appropriate to situation/content.    Thought Process:  normal and logical, mildly circumstantial   Associations:  intact    Thought Content:  normal, no suicidality, no homicidality, delusions, or paranoia    Insight & Judgement:  good, intact  adequate to circumstances, age appropriate, good    Orientation:  grossly intact, person, place, time/date, situation    Memory:  intact for content of interview, grossly intact    Language:  grossly intact    Attention Span & Concentration:  able to focus    Fund of Knowledge:  intact and appropriate to age and level of education, familiar with aspects of current personal life      Prior EKG:  Test Reason : Z79.899  Vent. Rate : 077 BPM     Atrial Rate : 077 BPM     P-R Int : 136 ms          QRS Dur : 090 ms      QT Int : 406 ms       P-R-T Axes : 033 000 073 degrees     QTc Int : 459 ms  Normal sinus rhythm  Poor R wave progression  Abnormal ECG  When compared with ECG of 21-JUL-2008 08:45,  Nonspecific T wave abnormality no longer evident in Inferior leads  QT has lengthened  Confirmed by Yaneli Ferguson MD ([id number]) on 8/11/2017 4:36:19 PM      Assessment and Diagnosis    Status/Progress: Based on the " examination today, the patient's problem(s) is/are adequately, but not ideally controlled. New problems have not been presented today. Comorbidities (chronic neuropathic pain) are complicating management of the primary condition. There are no active rule-out diagnoses for this patient at this time.    General Impression:   Major Depressive Disorder, Recurrent: Mild  Dysthymic Disorder   Generalized Anxiety Disorder  Chronic Insomnia   Multiple Sclerosis (NOT CODED)   Also: chronic pain, CHELSEA    Intervention/Counseling/Treatment Plan    Continue all current meds: Continue Cymbalta 120 mg daily  Continue Wellbutrin  mg total daily.  Pt has denied Hx of seizures.   Restart Seroquel 50 mg one tablet qhs (for depression/mood Sx and insomnia).    Continue Xanax 0.5 mg BID prn anxiety; can also use for sleep.    Avoid use of Adderall due to increased risk to CV system.  Pt can also take OTC Benadryl or melatonin for sleep prn.   Encourage individual psychotherapy for support. Patient had been meeting with LCSW in Neurology clinic; she can continue this (if insurance allows) or consider f/u with Dr. Nery Toledo or other therapist in our clinic.        Additional Notes:  N/A      Return to Clinic: 3 - 4 months, or sooner prn.

## 2020-10-05 ENCOUNTER — PATIENT MESSAGE (OUTPATIENT)
Dept: ADMINISTRATIVE | Facility: HOSPITAL | Age: 60
End: 2020-10-05

## 2020-10-16 DIAGNOSIS — G35 MULTIPLE SCLEROSIS: ICD-10-CM

## 2020-10-22 RX ORDER — GLATIRAMER 40 MG/ML
INJECTION, SOLUTION SUBCUTANEOUS
Qty: 12 SYRINGE | Refills: 0 | Status: SHIPPED | OUTPATIENT
Start: 2020-10-22 | End: 2021-01-28

## 2020-11-02 ENCOUNTER — PATIENT MESSAGE (OUTPATIENT)
Dept: PSYCHIATRY | Facility: CLINIC | Age: 60
End: 2020-11-02

## 2020-11-18 ENCOUNTER — PATIENT MESSAGE (OUTPATIENT)
Dept: NEUROLOGY | Facility: CLINIC | Age: 60
End: 2020-11-18

## 2020-11-18 ENCOUNTER — OFFICE VISIT (OUTPATIENT)
Dept: OPTOMETRY | Facility: CLINIC | Age: 60
End: 2020-11-18
Payer: COMMERCIAL

## 2020-11-18 ENCOUNTER — CLINICAL SUPPORT (OUTPATIENT)
Dept: OPHTHALMOLOGY | Facility: CLINIC | Age: 60
End: 2020-11-18
Payer: COMMERCIAL

## 2020-11-18 DIAGNOSIS — H21.562 AFFERENT PUPILLARY DEFECT OF LEFT EYE: ICD-10-CM

## 2020-11-18 DIAGNOSIS — Z98.890 HISTORY OF LASER REFRACTIVE SURGERY: ICD-10-CM

## 2020-11-18 DIAGNOSIS — H52.203 ASTIGMATISM OF BOTH EYES, UNSPECIFIED TYPE: ICD-10-CM

## 2020-11-18 DIAGNOSIS — H40.053 OCULAR HYPERTENSION, BILATERAL: Primary | ICD-10-CM

## 2020-11-18 DIAGNOSIS — G35 MULTIPLE SCLEROSIS: ICD-10-CM

## 2020-11-18 DIAGNOSIS — H52.4 PRESBYOPIA OF BOTH EYES: ICD-10-CM

## 2020-11-18 PROCEDURE — 92015 PR REFRACTION: ICD-10-PCS | Mod: S$GLB,,, | Performed by: OPTOMETRIST

## 2020-11-18 PROCEDURE — 92083 EXTENDED VISUAL FIELD XM: CPT | Mod: S$GLB,,, | Performed by: OPTOMETRIST

## 2020-11-18 PROCEDURE — 92014 PR EYE EXAM, EST PATIENT,COMPREHESV: ICD-10-PCS | Mod: S$GLB,,, | Performed by: OPTOMETRIST

## 2020-11-18 PROCEDURE — 1126F PR PAIN SEVERITY QUANTIFIED, NO PAIN PRESENT: ICD-10-PCS | Mod: S$GLB,,, | Performed by: OPTOMETRIST

## 2020-11-18 PROCEDURE — 92014 COMPRE OPH EXAM EST PT 1/>: CPT | Mod: S$GLB,,, | Performed by: OPTOMETRIST

## 2020-11-18 PROCEDURE — 92083 HUMPHREY VISUAL FIELD - OU - BOTH EYES: ICD-10-PCS | Mod: S$GLB,,, | Performed by: OPTOMETRIST

## 2020-11-18 PROCEDURE — 92015 DETERMINE REFRACTIVE STATE: CPT | Mod: S$GLB,,, | Performed by: OPTOMETRIST

## 2020-11-18 PROCEDURE — 99999 PR PBB SHADOW E&M-EST. PATIENT-LVL IV: CPT | Mod: PBBFAC,,, | Performed by: OPTOMETRIST

## 2020-11-18 PROCEDURE — 1126F AMNT PAIN NOTED NONE PRSNT: CPT | Mod: S$GLB,,, | Performed by: OPTOMETRIST

## 2020-11-18 PROCEDURE — 99999 PR PBB SHADOW E&M-EST. PATIENT-LVL IV: ICD-10-PCS | Mod: PBBFAC,,, | Performed by: OPTOMETRIST

## 2020-11-18 RX ORDER — SODIUM FLUORIDE 1.1 G/100G
CREAM ORAL
COMMUNITY
Start: 2020-08-28

## 2020-11-18 NOTE — PATIENT INSTRUCTIONS
History of multiple sclerosis.  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     Prior diagnosis of bilaeral ocular hypertension.    Repeat HVF test (24-2 KARO standard) done today.  Visual field in OD essentially normal, without evidence of glaucomatous visual field defect.  Focal area of depression inferiorly in the left eye, suggestive of early/mild glaucomatous visual field defect      Had been using Latanoprost 0.005% drops for IOP control/reduction in both eyes once per day.  Ran out about two weeks ago.  IOP above normal in each eye today (28 mm Hg in right eye, and 27 mm Hg in left eye).     Discussed with patient.  Resume use of Latanoprost 0.005% ophthalmic solution in both eyes (one drop every evening).  Return in two to three weeks for IOP check      S/P LASIK refractive surgery in both eyes.  Residual astigmatic refractive error in each eye, greater in the left eye than in the right eye.  Satisfactory best-corrected VA in each eye.  Presbyopia consistent with age.      New spectacle lens Rx issued for use as desired.     Repeat refraction in one year

## 2020-11-18 NOTE — PROGRESS NOTES
HPI     ocular hypertension, bilateral      Additional comments: Annual general eye examination and refractin.  H/o afferent pupil defect OS (? 2/2 previously undiagnosed optic neuritis   of OS 2/2 MS).  Ran out of drops for IOP control/reduction 2 weeks ago.  Wears glasses full-time.  Happy with VA with glasses.                Comments     Patient's age: 60 y.o. WF   Occupation: Disabled due to MS  Approximate date of last eye examination:  07/23/2019  Name of last eye doctor seen: Dr. Murray  Wears glasses? Yes      If yes, wears  Full-time or part-time?  full time   Present glasses are: Bifocal, SV Distance, SV Reading?  Bifocals lens      Any problem with VA with glasses?  not sure -  they are scratched up   Wears CLs?:  no   Headaches?  no   Eye pain/discomfort?  no                                                                                     Flashes?  no   Floaters?  Intermittent   Diplopia/Double vision?  no   Patient's Ocular History:          Any eye surgeries? LASIK in  2000          Any eye injury?  no          Any treatment for eye disease?   bilateral ocular hypertension -has   been  using Latanoprost 0.005% ophthalmic solution in both eyes daily, but   ran out two weeks ago.   Family history of eye disease?  no   Significant patient medical history:         1. Diabetes?  no      If yes, IDDM or NIDDM?  n/a        2. HBP?  no               3. Other (describe):  Multiple Sclerosis, Fibromyalgia    ! OTC eyedrops currently using:  no    ! Prescription eye meds currently using:  Latanoprost daily at night OU -   but has not used for past two weeks (ran out)    ! Any history of allergy/adverse reaction to any eye meds used   previously?  no    ! Any history of allergy/adverse reaction to eyedrops used during prior   eye exam(s)? no    ! Any history of allergy/adverse reaction to Novacaine or similar meds?   no    ! Any history of allergy/adverse reaction to Epinephrine or similar meds?   no    !  Patient okay with use of anesthetic eyedrops to check eye pressure?    yes        ! Patient okay with use of eyedrops to dilate pupils today?  yes    !  Allergies/Medications/Medical History/Family History reviewed today?    yes       PD =    65/61  Desired reading distance =  16' (+/-)                                        Last edited by Tony Murray, OD on 11/18/2020  3:19 PM. (History)            Assessment /Plan     For exam results, see Encounter Report.    1. Ocular hypertension, bilateral  Dodd Visual Field - OU - Extended - Both Eyes   2. Afferent pupillary defect of left eye     3. Multiple sclerosis     4. History of laser refractive surgery     5. Astigmatism of both eyes, unspecified type     6. Presbyopia of both eyes                    History of multiple sclerosis.  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     Prior diagnosis of bilateral ocular hypertension.    Repeat HVF test (24-2 KARO standard) done today.  Visual field in OD essentially normal, without evidence of glaucomatous visual field defect.  Focal area of depression inferiorly in the left eye, suggestive of early/mild glaucomatous visual field defect      Had been using Latanoprost 0.005% drops for IOP control/reduction in both eyes once per day.  Ran out about two weeks ago.  IOP above normal in each eye today (28 mm Hg in right eye, and 27 mm Hg in left eye).     Discussed with patient.  Resume use of Latanoprost 0.005% ophthalmic solution in both eyes (one drop every evening).  Return in two to three weeks for IOP check      S/P LASIK refractive surgery in both eyes.  Residual astigmatic refractive error in each eye, greater in the left eye than in the right eye.  Satisfactory best-corrected VA in each eye.  Presbyopia consistent with age.      New spectacle lens Rx issued for use as desired.     Repeat refraction  in one year

## 2020-11-19 ENCOUNTER — PATIENT MESSAGE (OUTPATIENT)
Dept: PSYCHIATRY | Facility: CLINIC | Age: 60
End: 2020-11-19

## 2020-11-19 ENCOUNTER — PATIENT MESSAGE (OUTPATIENT)
Dept: NEUROLOGY | Facility: CLINIC | Age: 60
End: 2020-11-19

## 2020-11-19 NOTE — TELEPHONE ENCOUNTER
SW has already communicated with pt regarding her disability forms and is handling under a separate encounter.

## 2020-12-08 ENCOUNTER — OFFICE VISIT (OUTPATIENT)
Dept: OPTOMETRY | Facility: CLINIC | Age: 60
End: 2020-12-08
Payer: COMMERCIAL

## 2020-12-08 ENCOUNTER — LAB VISIT (OUTPATIENT)
Dept: LAB | Facility: HOSPITAL | Age: 60
End: 2020-12-08
Payer: COMMERCIAL

## 2020-12-08 ENCOUNTER — OFFICE VISIT (OUTPATIENT)
Dept: NEUROLOGY | Facility: CLINIC | Age: 60
End: 2020-12-08
Payer: COMMERCIAL

## 2020-12-08 VITALS
HEART RATE: 69 BPM | BODY MASS INDEX: 31.31 KG/M2 | HEIGHT: 60 IN | DIASTOLIC BLOOD PRESSURE: 84 MMHG | SYSTOLIC BLOOD PRESSURE: 135 MMHG | WEIGHT: 159.5 LBS

## 2020-12-08 DIAGNOSIS — Z71.89 COUNSELING REGARDING GOALS OF CARE: ICD-10-CM

## 2020-12-08 DIAGNOSIS — H21.562 AFFERENT PUPILLARY DEFECT OF LEFT EYE: ICD-10-CM

## 2020-12-08 DIAGNOSIS — G31.84 MILD NEUROCOGNITIVE DISORDER: ICD-10-CM

## 2020-12-08 DIAGNOSIS — E55.9 VITAMIN D INSUFFICIENCY: ICD-10-CM

## 2020-12-08 DIAGNOSIS — H40.053 OCULAR HYPERTENSION, BILATERAL: Primary | ICD-10-CM

## 2020-12-08 DIAGNOSIS — F32.A ANXIETY AND DEPRESSION: ICD-10-CM

## 2020-12-08 DIAGNOSIS — N31.9 NEUROGENIC BLADDER: ICD-10-CM

## 2020-12-08 DIAGNOSIS — G35 MULTIPLE SCLEROSIS: ICD-10-CM

## 2020-12-08 DIAGNOSIS — F41.9 ANXIETY AND DEPRESSION: ICD-10-CM

## 2020-12-08 DIAGNOSIS — Z29.89 PROPHYLACTIC IMMUNOTHERAPY: ICD-10-CM

## 2020-12-08 DIAGNOSIS — G35 MS (MULTIPLE SCLEROSIS): Primary | ICD-10-CM

## 2020-12-08 DIAGNOSIS — G35 MS (MULTIPLE SCLEROSIS): ICD-10-CM

## 2020-12-08 LAB — 25(OH)D3+25(OH)D2 SERPL-MCNC: 32 NG/ML (ref 30–96)

## 2020-12-08 PROCEDURE — 92012 PR EYE EXAM, EST PATIENT,INTERMED: ICD-10-PCS | Mod: S$GLB,,, | Performed by: OPTOMETRIST

## 2020-12-08 PROCEDURE — 92012 INTRM OPH EXAM EST PATIENT: CPT | Mod: S$GLB,,, | Performed by: OPTOMETRIST

## 2020-12-08 PROCEDURE — 99999 PR PBB SHADOW E&M-EST. PATIENT-LVL IV: ICD-10-PCS | Mod: PBBFAC,,, | Performed by: PHYSICIAN ASSISTANT

## 2020-12-08 PROCEDURE — 99999 PR PBB SHADOW E&M-EST. PATIENT-LVL IV: CPT | Mod: PBBFAC,,, | Performed by: PHYSICIAN ASSISTANT

## 2020-12-08 PROCEDURE — 1125F AMNT PAIN NOTED PAIN PRSNT: CPT | Mod: S$GLB,,, | Performed by: PHYSICIAN ASSISTANT

## 2020-12-08 PROCEDURE — 1126F PR PAIN SEVERITY QUANTIFIED, NO PAIN PRESENT: ICD-10-PCS | Mod: S$GLB,,, | Performed by: OPTOMETRIST

## 2020-12-08 PROCEDURE — 36415 COLL VENOUS BLD VENIPUNCTURE: CPT

## 2020-12-08 PROCEDURE — 99215 PR OFFICE/OUTPT VISIT, EST, LEVL V, 40-54 MIN: ICD-10-PCS | Mod: S$GLB,,, | Performed by: PHYSICIAN ASSISTANT

## 2020-12-08 PROCEDURE — 99999 PR PBB SHADOW E&M-EST. PATIENT-LVL IV: ICD-10-PCS | Mod: PBBFAC,,, | Performed by: OPTOMETRIST

## 2020-12-08 PROCEDURE — 1126F AMNT PAIN NOTED NONE PRSNT: CPT | Mod: S$GLB,,, | Performed by: OPTOMETRIST

## 2020-12-08 PROCEDURE — 99215 OFFICE O/P EST HI 40 MIN: CPT | Mod: S$GLB,,, | Performed by: PHYSICIAN ASSISTANT

## 2020-12-08 PROCEDURE — 82306 VITAMIN D 25 HYDROXY: CPT

## 2020-12-08 PROCEDURE — 1125F PR PAIN SEVERITY QUANTIFIED, PAIN PRESENT: ICD-10-PCS | Mod: S$GLB,,, | Performed by: PHYSICIAN ASSISTANT

## 2020-12-08 PROCEDURE — 3008F PR BODY MASS INDEX (BMI) DOCUMENTED: ICD-10-PCS | Mod: CPTII,S$GLB,, | Performed by: PHYSICIAN ASSISTANT

## 2020-12-08 PROCEDURE — 99999 PR PBB SHADOW E&M-EST. PATIENT-LVL IV: CPT | Mod: PBBFAC,,, | Performed by: OPTOMETRIST

## 2020-12-08 PROCEDURE — 3008F BODY MASS INDEX DOCD: CPT | Mod: CPTII,S$GLB,, | Performed by: PHYSICIAN ASSISTANT

## 2020-12-08 RX ORDER — SOLIFENACIN SUCCINATE 5 MG/1
5 TABLET, FILM COATED ORAL DAILY
Qty: 90 TABLET | Refills: 3 | Status: SHIPPED | OUTPATIENT
Start: 2020-12-08 | End: 2021-11-29

## 2020-12-08 NOTE — PROGRESS NOTES
HPI     Ocular Hypertension      Additional comments: No complaints              Comments     Patient in for progress check.    Being followed for (diagnosis):   Ocular hypertension     Date last seen:  11/18/2020    Doctor last seen:      Prescribed eye medications(s) using:  Latanoprost-Generic at night OU    OTC eye medication(s) using:  No     Signs/symptoms of condition resolved/better/stable/worse?:                    Last edited by Lenny Rm MA on 12/8/2020  2:03 PM. (History)            Assessment /Plan     For exam results, see Encounter Report.    1. Ocular hypertension, bilateral     2. Afferent pupillary defect of left eye     3. Multiple sclerosis                  Prior finding of bilateral ocular hypertension   Good response to latanoprost in each eye.  IOP now well within normal range (14 mm in each eye today - note pre-treatment IOP of 28 mm Hg in the right eye and 27 mm Hg in the left eye).  HVF test done on previous visit.  See previous notes.  Continue to instill one drop of Latanoprost 0.005% ophthalmic solution into each eye once per day.  Return in four months for recheck of IOP.

## 2020-12-08 NOTE — Clinical Note
Saw Krystal in clinic today--I believe you have disability paperwork. Also put in a referral for counseling

## 2020-12-08 NOTE — PROGRESS NOTES
"Subjective:          Patient ID: Karen D Eleser is a 60 y.o. female who presents today for a routine clinic visit for MS.  Last visit October 2019 with Dr. Minaya    MS HPI:  · DMT: glatiramer acetate--consistent--very rarely misses doses  · Side effects from DMT? No  · Taking vitamin D3 as recommended? No-ran out a month ago   · Feels like MS has been stable--no new neurological symptoms  · Hospitalized acute psychosis in June  · Fall several days prior to hospitalization-fractured R ankle(no surgery). Also, had torn meniscus and surgical repair L knee(6-8 weeks ago)  · Has not taken Adderall---"I'm not doing anything anymore" due to COVID    Medications:  Current Outpatient Medications   Medication Sig    ALPRAZolam (XANAX) 0.5 MG tablet Take 1 tablet (0.5 mg total) by mouth 2 (two) times daily.    buPROPion (WELLBUTRIN XL) 300 MG 24 hr tablet TAKE 1 TABLET BY MOUTH EVERY DAY WITH FOOD    cholecalciferol, vitamin D3, (VITAMIN D3) 5,000 unit Tab Take 5,000 Units by mouth once daily.     DENTA 5000 PLUS 1.1 % Crea USE AS DIRECTED DAILY    dextroamphetamine-amphetamine (ADDERALL) 20 mg tablet Take 1 tablet by mouth 2 (two) times daily.    dextroamphetamine-amphetamine (ADDERALL) 20 mg tablet Take 1 tablet by mouth 2 (two) times daily.    diclofenac sodium (VOLTAREN) 1 % Gel Apply 2 g topically 4 (four) times daily.    DULoxetine (CYMBALTA) 60 MG capsule TAKE 2 CAPSULES BY MOUTH ONCE DAILY    FLUZONE QUAD 2059-1767, PF, 60 mcg (15 mcg x 4)/0.5 mL Syrg TO BE ADMINISTERED BY PHARMACIST FOR IMMUNIZATION    gabapentin (NEURONTIN) 600 MG tablet TAKE 2 TABLETS EVERY MORNING, 1 TABLET AT NOON AND 2 TABLETS AT BEDTIME    glatiramer (COPAXONE, GLATOPA) 40 mg/mL injection INJECT ONE SYRINGE SUBCUTANEOUSLY 3 TIMES A WEEK AT LEAST 48 HOURS APART. ALLOW TO WARM TO ROOM TEMP FOR 20 MINUTES. REFRIGERATE.    HYDROcodone-acetaminophen (NORCO) 5-325 mg per tablet Take 1 tablet by mouth 3 (three) times daily as needed.    " ketoconazole (XOLEGEL) 2 % Gel Apply topically.    latanoprost 0.005 % ophthalmic solution Place 1 drop into both eyes every evening.    meloxicam (MOBIC) 15 MG tablet TAKE 1 TABLET BY MOUTH EVERY DAY    meloxicam (MOBIC) 15 MG tablet TAKE 1 TABLET BY MOUTH EVERY DAY    multivitamin (THERAGRAN) per tablet Take by mouth. 1 Tablet Oral Every day    PREVIDENT 5000 SENSITIVE 1.1-5 % Pste Apply topically once daily.     QUEtiapine (SEROQUEL) 50 MG tablet TAKE 1 TABLET BY MOUTH EVERY EVENING    rosuvastatin (CRESTOR) 10 MG tablet TAKE 1 TABLET BY MOUTH EVERY DAY IN THE EVENING    solifenacin (VESICARE) 10 MG tablet TAKE 1 TABLET BY MOUTH EVERY DAY    tiZANidine (ZANAFLEX) 2 MG tablet TAKE 1/2 TABLET BY MOUTH TWICE A DAY THEN 2 TABS AT BEDTIME AS NEEDED    traMADol (ULTRAM) 50 mg tablet Take 50 mg by mouth 4 (four) times daily.     No current facility-administered medications for this visit.        SOCIAL HISTORY  Social History     Tobacco Use    Smoking status: Never Smoker    Smokeless tobacco: Never Used   Substance Use Topics    Alcohol use: Yes     Comment: rare    Drug use: No       Living arrangements - the patient lives alone.    ROS:    REVIEW OF SYMPTOMS 12/8/2020   Do you feel abnormally tired on most days? Yes-she is not taking Adderall 20mg but wonders if she should   Do you feel you generally sleep well? No--has CHELSEA-does not use CPAP. Seroquel-helpful   Do you have difficulty controlling your bladder?  Yes-hesitant and urgent-Vesicare--has 10mg prescribed but prefers 5mg. Urgency more of an issue for her   Do you have difficulty controlling your bowels?  No   Do you have frequent muscle cramps, tightness or spasms in your limbs?  Yes-tizanidine 2mg--takes 1/2 tab in AM and at times afternoon, spasms upper back. 4mg HS   Do you have new visual symptoms?  No   Do you have worsening difficulty with your memory or thinking? Yes- worsening ST  memory, word finding-  NP testing June 2019-  [facility]-mild cognitive dysfuntion   Do you have worsening symptoms of anxiety or depression?  No-sees Dr. Tyler every 3 months- is not in counseling-Xanax daily -generally once daily; Wellbutrin,    For patients who walk, Do you have more difficulty walking?  No   Have you fallen since your last visit?  Yes-see above   For patients who use wheelchairs: Do you have any skin wounds or breakdown? Not Applicable   Do you have difficulty using your hands?  No   Do you have shooting or burning pain? Yes--bilateral feet-gabapentin 1200mg/600mg(PRN)/1200ms HS. Cymbalta 120mg in AM   Do you have difficulty with sexual function?  No   If you are sexually active, are you using birth control? Y/N  N/A Not Applicable   Do you often choke when swallowing liquids or solid food?  No   Do you experience worsening symptoms when overheated? Yes   Do you need any new equipment such as a wheelchair, walker or shower chair? No   Do you receive co-pay financial assistance for your principal MS medicine? Yes   Would you be interested in participating in an MS research trial in the future? Yes   For patients on Gilenya, Tecfidera, Aubagio, Rituxan, Ocrevus, Tysabri, Lemtrada or Methotrexate, are you aware that you should NOT receive live virus vaccines?  Not Applicable   Do you feel you have adequate family/friend support?  No   Do you have health insurance?   Yes   Are you currently employed? No   Do you receive SSDI/SSI?  No, currently has private disability from previous employer   Do you use marijuana or cannabis products? Yes--THC(medical marijuana)-Dr. Diogo Mills ([place])-Pain specialist   How often? Daily   Have you been diagnosed with a urinary tract infection since your last visit here? No   Have you been diagnosed with a respiratory tract infection since your last visit here? No   Have you been to the emergency room since your last visit here? Yes   Have you been hospitalized since your last visit here?  Yes             Objective:        1. 25 foot timed walk:  Timed 25 Foot Walk: 2017 10/1/2019   Did patient wear an AFO? No No   Was assistive device used? No No   Time for 25 Foot Walk (seconds) 5 5.2   Time for 25 Foot Walk (seconds) - 5.8       Neurologic Exam     Mental Status   Oriented to person, place, and time.   Follows 3 step commands.   Speech: speech is normal   Level of consciousness: alert  Normal comprehension.     Cranial Nerves     CN III, IV, VI   Extraocular motions are normal.   Right pupil: Shape: regular.   Left pupil: Shape: regular.     CN V   Facial sensation intact.     CN VII   Facial expression full, symmetric.     CN VIII   Hearing: intact (finger rub)    CN IX, X   Palate: symmetric    CN XI   CN XI normal.     CN XII   Tongue deviation: none    Motor Exam   Muscle bulk: normal  Overall muscle tone: normal    Strength   Right deltoid: 5/5  Left deltoid: 5/5  Right triceps: 5/5  Left triceps: 5/5  Right wrist extension: 5/5  Left wrist extension: 5/5  Right interossei: 5/5  Left interossei: 5/5  Right iliopsoas: 5/5  Left iliopsoas: 5/5  Right hamstrin/5  Left hamstrin/5  Right anterior tibial: 5/5  Left anterior tibial: 5/5  Right peroneal: 5/5  Left peroneal: 5/5    Sensory Exam   Light touch normal.   Left arm light touch: tingling.  Left leg light touch: tingling.  Right arm vibration: decreased from fingers  Left arm vibration: decreased from fingers  Right leg vibration: decreased from toes  Left leg vibration: decreased from toes    Gait, Coordination, and Reflexes     Gait  Gait: wide-based    Coordination   Finger to nose coordination: normal  Tandem walking coordination: abnormal    Reflexes   Right brachioradialis: 2+  Left brachioradialis: 2+  Right biceps: 2+  Left biceps: 2+  Right triceps: 2+  Left triceps: 2+  Right patellar: 3+  Left patellar: 3+  Right achilles: 1+  Left achilles: 1+        Imaging:     Results for orders placed during the hospital encounter of 05/15/19    MRI Brain W WO Contrast    Narrative EXAMINATION:  MRI BRAIN W WO CONTRAST    CLINICAL HISTORY:  Multiple sclerosis    TECHNIQUE:  Multiplanar multisequence MR imaging of the brain was performed before and after the administration of 7 mL Gadavist intravenous contrast.    COMPARISON:  1/10/2018    FINDINGS:  There is no restricted diffusion suggesting acute infarct.  There is normal vascular flow void in major, central intracranial vessels.  The craniocervical junction is within normal limits in appearance.  There is mild dilatation of ventricles, sulci, fissures appearing mildly more so than typically seen with the patient's age.  There again demonstrated multiple white matter lesions with multiple foci predominantly periventricular and pericallosal white matter increased T2/FLAIR signal including along the corpus callosum and a lesion perpendicularly oriented along the left lateral ventricle consistent with Lara's finger.  Findings are consistent with the given history of multiple sclerosis do not appear significantly changed.    Fluid is no longer seen within the maxillary sinuses today.  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  No abnormal contrast enhancement and no restricted diffusion.      Impression White matter changes greater than typically seen with the patient's age and with appearance consistent with the given history of multiple sclerosis without significant oval change compared to the prior exam of 1/10/2018.  No abnormal enhancement and no diffusion restriction      Electronically signed by: Patricia Felix MD  Date:    05/15/2019  Time:    15:28     Results for orders placed during the hospital encounter of 05/15/19   MRI Cervical Spine W WO Cont    Narrative EXAMINATION:  MRI CERVICAL SPINE W WO CONTRAST    CLINICAL HISTORY:  Multiple sclerosis    TECHNIQUE:  Multisequence multiplanar imaging of the cervical spine obtained without and with contrast with 7 cc Gadavist injected  intravenously    COMPARISON:  1/5/2017    FINDINGS:  Vertebral body heights and alignment are maintained with no acute compression or subluxation and there is no abnormal marrow signal suggesting fracture or osseous destruction.  The craniocervical junction is normal in appearance.  The cervical spinal cord is intrinsically normal in appearance.  No demyelinating plaques are seen and no abnormal contrast enhancement.    There is mild disc space narrowing C4-C5, C5-C6 and to lesser degree C6-C7.  There is a very slight reversal of the usual cervical lordosis in the midcervical spine.    C2-C3: No disc protrusion or spinal canal or neural foraminal narrowing    C3-C4: Mild facet arthropathy.  Mild uncovertebral spurring more so on the right.  Just very mild impression on the thecal sac anteriorly.  Mild neural foraminal narrowing bilaterally    C4-C5: Facet arthropathy.  Minimal broad disc bulge and uncovertebral spurring.  Mild left neural foraminal narrowing without spinal canal or right neural foraminal narrowing    C5-C6: Small broad posterior disc bulge slightly more so broad based posterior right lateral and toward the foramen.  Moderate bilateral neural foraminal narrowing more so on the right..  Just mild/very mild spinal canal narrowing with mild impression on the thecal sac anteriorly    C6-C7: Facet arthropathy without spinal canal or neural foraminal narrowing    C7-T1: No spinal canal neural foraminal narrowing.      Impression No significant change compared to the prior exam.  Very mild disc bulges and mild degenerative change.  No demyelinating plaques noted within the cervical spine.      Electronically signed by: Patricia Felix MD  Date:    05/15/2019  Time:    15:50         Labs:     Lab Results   Component Value Date    FVOCDUKR86BA 32 12/08/2020    MCENFHHF57UO 85 03/10/2020    BKVQEMBH07SP 39 12/13/2016     No results found for: JCVINDEX, JCVANTIBODY  No results found for: MT4KZRVE, ABSOLUTECD3,  ES0GAKNL, ABSOLUTECD8, ND0TRXWY, ABSOLUTECD4, LABCD48  Lab Results   Component Value Date    WBC 6.83 03/10/2020    RBC 4.56 03/10/2020    HGB 13.8 03/10/2020    HCT 45.0 03/10/2020    MCV 99 (H) 03/10/2020    MCH 30.3 03/10/2020    MCHC 30.7 (L) 03/10/2020    RDW 13.3 03/10/2020     03/10/2020    MPV 11.6 03/10/2020    GRAN 4.8 03/10/2020    GRAN 69.5 03/10/2020    LYMPH 1.4 03/10/2020    LYMPH 20.1 03/10/2020    MONO 0.4 03/10/2020    MONO 6.3 03/10/2020    EOS 0.2 03/10/2020    BASO 0.06 03/10/2020    EOSINOPHIL 2.8 03/10/2020    BASOPHIL 0.9 03/10/2020     Sodium   Date Value Ref Range Status   03/10/2020 144 136 - 145 mmol/L Final     Potassium   Date Value Ref Range Status   03/10/2020 3.8 3.5 - 5.1 mmol/L Final     Chloride   Date Value Ref Range Status   03/10/2020 105 95 - 110 mmol/L Final     CO2   Date Value Ref Range Status   03/10/2020 27 23 - 29 mmol/L Final     Glucose   Date Value Ref Range Status   03/10/2020 97 70 - 110 mg/dL Final     BUN   Date Value Ref Range Status   03/10/2020 13 6 - 20 mg/dL Final     Creatinine   Date Value Ref Range Status   03/10/2020 0.9 0.5 - 1.4 mg/dL Final     Calcium   Date Value Ref Range Status   03/10/2020 9.9 8.7 - 10.5 mg/dL Final     Total Protein   Date Value Ref Range Status   03/10/2020 7.2 6.0 - 8.4 g/dL Final     Albumin   Date Value Ref Range Status   03/10/2020 4.3 3.5 - 5.2 g/dL Final     Total Bilirubin   Date Value Ref Range Status   03/10/2020 0.4 0.1 - 1.0 mg/dL Final     Comment:     For infants and newborns, interpretation of results should be based  on gestational age, weight and in agreement with clinical  observations.  Premature Infant recommended reference ranges:  Up to 24 hours.............<8.0 mg/dL  Up to 48 hours............<12.0 mg/dL  3-5 days..................<15.0 mg/dL  6-29 days.................<15.0 mg/dL       Alkaline Phosphatase   Date Value Ref Range Status   03/10/2020 92 55 - 135 U/L Final     AST   Date Value Ref  Range Status   03/10/2020 17 10 - 40 U/L Final     ALT   Date Value Ref Range Status   03/10/2020 14 10 - 44 U/L Final     Anion Gap   Date Value Ref Range Status   03/10/2020 12 8 - 16 mmol/L Final     eGFR if    Date Value Ref Range Status   03/10/2020 >60.0 >60 mL/min/1.73 m^2 Final     eGFR if non    Date Value Ref Range Status   03/10/2020 >60.0 >60 mL/min/1.73 m^2 Final     Comment:     Calculation used to obtain the estimated glomerular filtration  rate (eGFR) is the CKD-EPI equation.        No results found for: HEPBSAG, HEPBSAB, HEPBCAB        MS Impression and Plan:     NEURO MULTIPLE SCLEROSIS IMPRESSION:   MS Status:     Number of relapses in the past year?:  0    Clinical Progression:  Clinically Stable    MRI Progression comment:  Slightly past due-ordered today  Plan:     DMT:  No change in management    DMT comment:  Continue Glatiramer and high dose Vit D3-will screen as she has been off therapy for about a month now. Patient advised to continue to follow CDC and MS Society recommendations regarding COVID-ie. hand hygiene, social distancing, masks, etc.       Symptom Management:  Implement change in symptom management    Implement Change in Symptom Management:  Bladder and Mood (refilled Vesicare; ordered counseling with MS )     Next Imaging Due: 12/9/2020     Next Labs Due: 12/9/2020    Our visit today lasted 40 minutes, and 100% of this time was spent face to face with the patient. Over 50% of this visit included discussion of the treatment plan/medication changes/symptom management/exam findings/imaging/coordination of care.     Problem List Items Addressed This Visit        Neuro    Multiple sclerosis     Continue Glatiramer and high dose Vit D3         Relevant Orders    MRI Brain Demyelinating Without Contrast    Mild neurocognitive disorder       Renal/    Neurogenic bladder     Refilled Vesicare today         Relevant Medications    solifenacin  (VESICARE) 5 MG tablet       Endocrine    Vitamin D insufficiency     Screening ordered, patient has not been on supplementation for approx a month. The patient was counseled about the importance of vitamin D supplementation in multiple sclerosis, and the fact that data suggests that high circulating blood levels of vitamin D has an ameliorating effect on the disease course in multiple sclerosis in general.           Relevant Orders    Vitamin D (Completed)       Other    Prophylactic immunotherapy    Counseling regarding goals of care      Other Visit Diagnoses     MS (multiple sclerosis)    -  Primary    Relevant Medications    solifenacin (VESICARE) 5 MG tablet    Other Relevant Orders    Vitamin D (Completed)    Anxiety and depression        Relevant Orders    Ambulatory referral/consult to Multiple Sclerosis Social Work          Follow up in about 6 months (around 6/8/2021) for follow up with me.  Patient agreed to POC today.    Attending, Dr. Minaya, was available during today's encounter.     Daniela Mascorro PA-C  [facility]

## 2020-12-08 NOTE — PATIENT INSTRUCTIONS
Prior finding of bilateral ocular hypertension   Good response to latanoprost in each eye.  IOP now well within normal range (14 mm in each eye today - note pre-treatment IOP of 28 mm Hg in the right eye and 27 mm Hg in the left eye).  HVF test done on previous visit.  See previous notes.  Continue to instill one drop of Latanoprost 0.005% ophthalmic solution into each eye once per day.  Return in four months for recheck of IOP.

## 2020-12-09 PROBLEM — N31.9 NEUROGENIC BLADDER: Status: ACTIVE | Noted: 2020-12-09

## 2020-12-09 NOTE — ASSESSMENT & PLAN NOTE
Screening ordered, patient has not been on supplementation for approx a month. The patient was counseled about the importance of vitamin D supplementation in multiple sclerosis, and the fact that data suggests that high circulating blood levels of vitamin D has an ameliorating effect on the disease course in multiple sclerosis in general.

## 2020-12-16 ENCOUNTER — PATIENT MESSAGE (OUTPATIENT)
Dept: NEUROLOGY | Facility: CLINIC | Age: 60
End: 2020-12-16

## 2020-12-17 ENCOUNTER — PATIENT MESSAGE (OUTPATIENT)
Dept: PSYCHIATRY | Facility: CLINIC | Age: 60
End: 2020-12-17

## 2020-12-21 NOTE — TELEPHONE ENCOUNTER
Faxed [employer] Disability forms and pt's records to [phone].  Copy scanned to chart and sent to patient.  SW also messaged pt about her interest in counseling, per referral from BECKY Mascorro PA-C.

## 2020-12-23 ENCOUNTER — HOSPITAL ENCOUNTER (OUTPATIENT)
Dept: RADIOLOGY | Facility: HOSPITAL | Age: 60
Discharge: HOME | End: 2020-12-23
Attending: PHYSICIAN ASSISTANT
Payer: COMMERCIAL

## 2020-12-23 DIAGNOSIS — G35 MULTIPLE SCLEROSIS: ICD-10-CM

## 2020-12-23 PROCEDURE — 70551 MRI BRAIN STEM W/O DYE: CPT | Mod: 26,,, | Performed by: RADIOLOGY

## 2020-12-23 PROCEDURE — 70551 MRI BRAIN STEM W/O DYE: CPT | Mod: TC,PO

## 2020-12-23 PROCEDURE — 70551 MRI BRAIN DEMYELINATING WITHOUT CONTRAST: ICD-10-PCS | Mod: 26,,, | Performed by: RADIOLOGY

## 2021-01-04 ENCOUNTER — PATIENT MESSAGE (OUTPATIENT)
Dept: ADMINISTRATIVE | Facility: HOSPITAL | Age: 61
End: 2021-01-04

## 2021-01-13 ENCOUNTER — PATIENT MESSAGE (OUTPATIENT)
Dept: NEUROLOGY | Facility: CLINIC | Age: 61
End: 2021-01-13

## 2021-02-05 ENCOUNTER — PATIENT MESSAGE (OUTPATIENT)
Dept: NEUROLOGY | Facility: CLINIC | Age: 61
End: 2021-02-05

## 2021-02-23 ENCOUNTER — PATIENT MESSAGE (OUTPATIENT)
Dept: RHEUMATOLOGY | Facility: CLINIC | Age: 61
End: 2021-02-23

## 2021-03-29 NOTE — TELEPHONE ENCOUNTER
Called to hernesto [facility]ARCIA appt on 6/3/2019 No ans LVM   Patient will be going out of town and  He is requesting a 90 day supply in all of his meds.....

## 2021-04-25 RX ORDER — MELOXICAM 15 MG/1
TABLET ORAL
Qty: 90 TABLET | Refills: 0 | Status: SHIPPED | OUTPATIENT
Start: 2021-04-25 | End: 2021-06-08 | Stop reason: SDUPTHER

## 2021-04-29 ENCOUNTER — OFFICE VISIT (OUTPATIENT)
Dept: PSYCHIATRY | Facility: CLINIC | Age: 61
End: 2021-04-29
Payer: COMMERCIAL

## 2021-04-29 DIAGNOSIS — F41.1 GAD (GENERALIZED ANXIETY DISORDER): ICD-10-CM

## 2021-04-29 DIAGNOSIS — F41.1 GENERALIZED ANXIETY DISORDER: ICD-10-CM

## 2021-04-29 DIAGNOSIS — R53.82 CHRONIC FATIGUE: ICD-10-CM

## 2021-04-29 DIAGNOSIS — F51.04 CHRONIC INSOMNIA: ICD-10-CM

## 2021-04-29 DIAGNOSIS — F34.1 DYSTHYMIC DISORDER: ICD-10-CM

## 2021-04-29 DIAGNOSIS — G35 MULTIPLE SCLEROSIS: ICD-10-CM

## 2021-04-29 DIAGNOSIS — F33.0 MDD (MAJOR DEPRESSIVE DISORDER), RECURRENT EPISODE, MILD: Primary | ICD-10-CM

## 2021-04-29 DIAGNOSIS — F33.1 MAJOR DEPRESSIVE DISORDER, RECURRENT EPISODE, MODERATE: ICD-10-CM

## 2021-04-29 PROCEDURE — 99213 PR OFFICE/OUTPT VISIT, EST, LEVL III, 20-29 MIN: ICD-10-PCS | Mod: 95,,, | Performed by: PSYCHIATRY & NEUROLOGY

## 2021-04-29 PROCEDURE — 99213 OFFICE O/P EST LOW 20 MIN: CPT | Mod: 95,,, | Performed by: PSYCHIATRY & NEUROLOGY

## 2021-04-29 RX ORDER — DEXTROAMPHETAMINE SACCHARATE, AMPHETAMINE ASPARTATE, DEXTROAMPHETAMINE SULFATE AND AMPHETAMINE SULFATE 5; 5; 5; 5 MG/1; MG/1; MG/1; MG/1
1 TABLET ORAL 2 TIMES DAILY
Qty: 60 TABLET | Refills: 0 | Status: SHIPPED | OUTPATIENT
Start: 2021-05-28 | End: 2021-12-21 | Stop reason: SDUPTHER

## 2021-04-29 RX ORDER — ALPRAZOLAM 0.5 MG/1
0.5 TABLET ORAL 2 TIMES DAILY
Qty: 60 TABLET | Refills: 5 | Status: SHIPPED | OUTPATIENT
Start: 2021-04-29 | End: 2021-11-22

## 2021-04-29 RX ORDER — DEXTROAMPHETAMINE SACCHARATE, AMPHETAMINE ASPARTATE, DEXTROAMPHETAMINE SULFATE AND AMPHETAMINE SULFATE 5; 5; 5; 5 MG/1; MG/1; MG/1; MG/1
1 TABLET ORAL 2 TIMES DAILY
Qty: 60 TABLET | Refills: 0 | Status: SHIPPED | OUTPATIENT
Start: 2021-04-29 | End: 2021-12-21 | Stop reason: SDUPTHER

## 2021-04-29 RX ORDER — QUETIAPINE FUMARATE 50 MG/1
50 TABLET, FILM COATED ORAL NIGHTLY
Qty: 90 TABLET | Refills: 1 | Status: SHIPPED | OUTPATIENT
Start: 2021-04-29 | End: 2021-10-25

## 2021-04-29 RX ORDER — BUPROPION HYDROCHLORIDE 300 MG/1
300 TABLET ORAL DAILY
Qty: 90 TABLET | Refills: 3 | Status: SHIPPED | OUTPATIENT
Start: 2021-04-29 | End: 2021-12-21 | Stop reason: SDUPTHER

## 2021-05-31 ENCOUNTER — PATIENT MESSAGE (OUTPATIENT)
Dept: PSYCHIATRY | Facility: CLINIC | Age: 61
End: 2021-05-31

## 2021-06-08 ENCOUNTER — OFFICE VISIT (OUTPATIENT)
Dept: NEUROLOGY | Facility: CLINIC | Age: 61
End: 2021-06-08
Payer: COMMERCIAL

## 2021-06-08 VITALS — WEIGHT: 150 LBS | BODY MASS INDEX: 29.29 KG/M2

## 2021-06-08 DIAGNOSIS — M79.2 NEUROPATHIC PAIN: ICD-10-CM

## 2021-06-08 DIAGNOSIS — F33.0 MDD (MAJOR DEPRESSIVE DISORDER), RECURRENT EPISODE, MILD: ICD-10-CM

## 2021-06-08 DIAGNOSIS — M15.9 GENERALIZED OSTEOARTHRITIS: ICD-10-CM

## 2021-06-08 DIAGNOSIS — N31.9 NEUROGENIC BLADDER: ICD-10-CM

## 2021-06-08 DIAGNOSIS — F51.04 CHRONIC INSOMNIA: ICD-10-CM

## 2021-06-08 DIAGNOSIS — Z71.89 COUNSELING REGARDING GOALS OF CARE: ICD-10-CM

## 2021-06-08 DIAGNOSIS — E55.9 VITAMIN D INSUFFICIENCY: ICD-10-CM

## 2021-06-08 DIAGNOSIS — G47.33 OSA (OBSTRUCTIVE SLEEP APNEA): ICD-10-CM

## 2021-06-08 DIAGNOSIS — G35 MULTIPLE SCLEROSIS: ICD-10-CM

## 2021-06-08 DIAGNOSIS — G35 MS (MULTIPLE SCLEROSIS): Primary | ICD-10-CM

## 2021-06-08 DIAGNOSIS — Z29.89 PROPHYLACTIC IMMUNOTHERAPY: ICD-10-CM

## 2021-06-08 DIAGNOSIS — M79.7 FIBROMYALGIA: ICD-10-CM

## 2021-06-08 PROCEDURE — 99214 PR OFFICE/OUTPT VISIT, EST, LEVL IV, 30-39 MIN: ICD-10-PCS | Mod: 95,,, | Performed by: PHYSICIAN ASSISTANT

## 2021-06-08 PROCEDURE — 99214 OFFICE O/P EST MOD 30 MIN: CPT | Mod: 95,,, | Performed by: PHYSICIAN ASSISTANT

## 2021-06-08 PROCEDURE — 3008F BODY MASS INDEX DOCD: CPT | Mod: CPTII,,, | Performed by: PHYSICIAN ASSISTANT

## 2021-06-08 PROCEDURE — 3008F PR BODY MASS INDEX (BMI) DOCUMENTED: ICD-10-PCS | Mod: CPTII,,, | Performed by: PHYSICIAN ASSISTANT

## 2021-06-08 RX ORDER — GABAPENTIN 600 MG/1
TABLET ORAL
Qty: 450 TABLET | Refills: 1 | Status: SHIPPED | OUTPATIENT
Start: 2021-06-08 | End: 2022-04-20 | Stop reason: SDUPTHER

## 2021-06-29 ENCOUNTER — TELEPHONE (OUTPATIENT)
Dept: NEUROLOGY | Facility: CLINIC | Age: 61
End: 2021-06-29

## 2021-09-01 ENCOUNTER — PATIENT MESSAGE (OUTPATIENT)
Dept: PSYCHIATRY | Facility: CLINIC | Age: 61
End: 2021-09-01

## 2021-09-02 ENCOUNTER — PATIENT MESSAGE (OUTPATIENT)
Dept: PSYCHIATRY | Facility: CLINIC | Age: 61
End: 2021-09-02

## 2021-09-21 ENCOUNTER — PATIENT MESSAGE (OUTPATIENT)
Dept: PSYCHIATRY | Facility: CLINIC | Age: 61
End: 2021-09-21

## 2021-09-22 ENCOUNTER — PATIENT MESSAGE (OUTPATIENT)
Dept: INTERNAL MEDICINE | Facility: CLINIC | Age: 61
End: 2021-09-22

## 2021-09-22 DIAGNOSIS — Z12.31 ENCOUNTER FOR SCREENING MAMMOGRAM FOR BREAST CANCER: Primary | ICD-10-CM

## 2021-09-27 ENCOUNTER — HOSPITAL ENCOUNTER (OUTPATIENT)
Dept: RADIOLOGY | Facility: HOSPITAL | Age: 61
Discharge: HOME | End: 2021-09-27
Attending: INTERNAL MEDICINE
Payer: COMMERCIAL

## 2021-09-27 VITALS — BODY MASS INDEX: 29.44 KG/M2 | WEIGHT: 149.94 LBS | HEIGHT: 60 IN

## 2021-09-27 DIAGNOSIS — Z12.31 ENCOUNTER FOR SCREENING MAMMOGRAM FOR BREAST CANCER: ICD-10-CM

## 2021-09-27 PROCEDURE — 77063 BREAST TOMOSYNTHESIS BI: CPT | Mod: 26,,, | Performed by: RADIOLOGY

## 2021-09-27 PROCEDURE — 77067 SCR MAMMO BI INCL CAD: CPT | Mod: 26,,, | Performed by: RADIOLOGY

## 2021-09-27 PROCEDURE — 77063 MAMMO DIGITAL SCREENING BILAT WITH TOMO: ICD-10-PCS | Mod: 26,,, | Performed by: RADIOLOGY

## 2021-09-27 PROCEDURE — 77067 MAMMO DIGITAL SCREENING BILAT WITH TOMO: ICD-10-PCS | Mod: 26,,, | Performed by: RADIOLOGY

## 2021-09-27 PROCEDURE — 77067 SCR MAMMO BI INCL CAD: CPT | Mod: TC,PO

## 2021-10-15 RX ORDER — MELOXICAM 15 MG/1
TABLET ORAL
Qty: 90 TABLET | Refills: 0 | Status: SHIPPED | OUTPATIENT
Start: 2021-10-15

## 2021-10-27 DIAGNOSIS — H40.053 OCULAR HYPERTENSION, BILATERAL: ICD-10-CM

## 2021-10-27 RX ORDER — LATANOPROST 50 UG/ML
1 SOLUTION/ DROPS OPHTHALMIC NIGHTLY
Qty: 7.5 ML | Refills: 2 | Status: CANCELLED | OUTPATIENT
Start: 2021-10-27

## 2021-11-01 DIAGNOSIS — H40.053 OCULAR HYPERTENSION, BILATERAL: ICD-10-CM

## 2021-11-01 RX ORDER — LATANOPROST 50 UG/ML
1 SOLUTION/ DROPS OPHTHALMIC NIGHTLY
Qty: 7.5 ML | Refills: 2 | Status: SHIPPED | OUTPATIENT
Start: 2021-11-01 | End: 2022-06-12

## 2021-11-08 ENCOUNTER — IMMUNIZATION (OUTPATIENT)
Dept: INTERNAL MEDICINE | Facility: CLINIC | Age: 61
End: 2021-11-08
Payer: COMMERCIAL

## 2021-11-08 ENCOUNTER — LAB VISIT (OUTPATIENT)
Dept: LAB | Facility: HOSPITAL | Age: 61
End: 2021-11-08
Attending: INTERNAL MEDICINE
Payer: COMMERCIAL

## 2021-11-08 ENCOUNTER — OFFICE VISIT (OUTPATIENT)
Dept: INTERNAL MEDICINE | Facility: CLINIC | Age: 61
End: 2021-11-08
Payer: COMMERCIAL

## 2021-11-08 VITALS
OXYGEN SATURATION: 98 % | WEIGHT: 158.5 LBS | HEART RATE: 109 BPM | BODY MASS INDEX: 31.12 KG/M2 | SYSTOLIC BLOOD PRESSURE: 120 MMHG | HEIGHT: 60 IN | DIASTOLIC BLOOD PRESSURE: 80 MMHG | RESPIRATION RATE: 16 BRPM

## 2021-11-08 DIAGNOSIS — R53.83 OTHER FATIGUE: ICD-10-CM

## 2021-11-08 DIAGNOSIS — Z00.00 WELLNESS EXAMINATION: Primary | ICD-10-CM

## 2021-11-08 DIAGNOSIS — E78.5 HYPERLIPIDEMIA, UNSPECIFIED HYPERLIPIDEMIA TYPE: ICD-10-CM

## 2021-11-08 DIAGNOSIS — G35 MULTIPLE SCLEROSIS: ICD-10-CM

## 2021-11-08 DIAGNOSIS — Z01.419 ENCOUNTER FOR ANNUAL ROUTINE GYNECOLOGICAL EXAMINATION: ICD-10-CM

## 2021-11-08 DIAGNOSIS — Z00.00 WELLNESS EXAMINATION: ICD-10-CM

## 2021-11-08 DIAGNOSIS — N31.9 NEUROGENIC BLADDER: ICD-10-CM

## 2021-11-08 PROBLEM — Z63.4 BEREAVEMENT: Status: RESOLVED | Noted: 2018-05-13 | Resolved: 2021-11-08

## 2021-11-08 LAB
25(OH)D3+25(OH)D2 SERPL-MCNC: 72 NG/ML (ref 30–96)
ALBUMIN SERPL BCP-MCNC: 4 G/DL (ref 3.5–5.2)
ALP SERPL-CCNC: 85 U/L (ref 55–135)
ALT SERPL W/O P-5'-P-CCNC: 21 U/L (ref 10–44)
ANION GAP SERPL CALC-SCNC: 9 MMOL/L (ref 8–16)
AST SERPL-CCNC: 20 U/L (ref 10–40)
BASOPHILS # BLD AUTO: 0.05 K/UL (ref 0–0.2)
BASOPHILS NFR BLD: 0.7 % (ref 0–1.9)
BILIRUB SERPL-MCNC: 0.4 MG/DL (ref 0.1–1)
BUN SERPL-MCNC: 18 MG/DL (ref 6–20)
CALCIUM SERPL-MCNC: 9.9 MG/DL (ref 8.7–10.5)
CHLORIDE SERPL-SCNC: 103 MMOL/L (ref 95–110)
CHOLEST SERPL-MCNC: 188 MG/DL (ref 120–199)
CHOLEST/HDLC SERPL: 3 {RATIO} (ref 2–5)
CO2 SERPL-SCNC: 30 MMOL/L (ref 23–29)
CREAT SERPL-MCNC: 0.8 MG/DL (ref 0.5–1.4)
DIFFERENTIAL METHOD: ABNORMAL
EOSINOPHIL # BLD AUTO: 0.2 K/UL (ref 0–0.5)
EOSINOPHIL NFR BLD: 2.6 % (ref 0–8)
ERYTHROCYTE [DISTWIDTH] IN BLOOD BY AUTOMATED COUNT: 14 % (ref 11.5–14.5)
EST. GFR  (AFRICAN AMERICAN): >60 ML/MIN/1.73 M^2
EST. GFR  (NON AFRICAN AMERICAN): >60 ML/MIN/1.73 M^2
GLUCOSE SERPL-MCNC: 100 MG/DL (ref 70–110)
HCT VFR BLD AUTO: 41.9 % (ref 37–48.5)
HDLC SERPL-MCNC: 63 MG/DL (ref 40–75)
HDLC SERPL: 33.5 % (ref 20–50)
HGB BLD-MCNC: 13.6 G/DL (ref 12–16)
IMM GRANULOCYTES # BLD AUTO: 0.02 K/UL (ref 0–0.04)
IMM GRANULOCYTES NFR BLD AUTO: 0.3 % (ref 0–0.5)
LDLC SERPL CALC-MCNC: 82.6 MG/DL (ref 63–159)
LYMPHOCYTES # BLD AUTO: 1.5 K/UL (ref 1–4.8)
LYMPHOCYTES NFR BLD: 20 % (ref 18–48)
MCH RBC QN AUTO: 31.2 PG (ref 27–31)
MCHC RBC AUTO-ENTMCNC: 32.5 G/DL (ref 32–36)
MCV RBC AUTO: 96 FL (ref 82–98)
MONOCYTES # BLD AUTO: 0.6 K/UL (ref 0.3–1)
MONOCYTES NFR BLD: 7.5 % (ref 4–15)
NEUTROPHILS # BLD AUTO: 5.3 K/UL (ref 1.8–7.7)
NEUTROPHILS NFR BLD: 68.9 % (ref 38–73)
NONHDLC SERPL-MCNC: 125 MG/DL
NRBC BLD-RTO: 0 /100 WBC
PLATELET # BLD AUTO: 269 K/UL (ref 150–450)
PMV BLD AUTO: 11.7 FL (ref 9.2–12.9)
POTASSIUM SERPL-SCNC: 3.7 MMOL/L (ref 3.5–5.1)
PROT SERPL-MCNC: 7.1 G/DL (ref 6–8.4)
RBC # BLD AUTO: 4.36 M/UL (ref 4–5.4)
SODIUM SERPL-SCNC: 142 MMOL/L (ref 136–145)
TRIGL SERPL-MCNC: 212 MG/DL (ref 30–150)
WBC # BLD AUTO: 7.69 K/UL (ref 3.9–12.7)

## 2021-11-08 PROCEDURE — 1159F MED LIST DOCD IN RCRD: CPT | Mod: CPTII,S$GLB,, | Performed by: INTERNAL MEDICINE

## 2021-11-08 PROCEDURE — 99386 PREV VISIT NEW AGE 40-64: CPT | Mod: 25,S$GLB,, | Performed by: INTERNAL MEDICINE

## 2021-11-08 PROCEDURE — 90686 IIV4 VACC NO PRSV 0.5 ML IM: CPT | Mod: S$GLB,,, | Performed by: INTERNAL MEDICINE

## 2021-11-08 PROCEDURE — 36415 COLL VENOUS BLD VENIPUNCTURE: CPT | Performed by: INTERNAL MEDICINE

## 2021-11-08 PROCEDURE — 85025 COMPLETE CBC W/AUTO DIFF WBC: CPT | Performed by: INTERNAL MEDICINE

## 2021-11-08 PROCEDURE — 3079F DIAST BP 80-89 MM HG: CPT | Mod: CPTII,S$GLB,, | Performed by: INTERNAL MEDICINE

## 2021-11-08 PROCEDURE — 90471 FLU VACCINE (QUAD) GREATER THAN OR EQUAL TO 3YO PRESERVATIVE FREE IM: ICD-10-PCS | Mod: S$GLB,,, | Performed by: INTERNAL MEDICINE

## 2021-11-08 PROCEDURE — 1159F PR MEDICATION LIST DOCUMENTED IN MEDICAL RECORD: ICD-10-PCS | Mod: CPTII,S$GLB,, | Performed by: INTERNAL MEDICINE

## 2021-11-08 PROCEDURE — 90471 IMMUNIZATION ADMIN: CPT | Mod: S$GLB,,, | Performed by: INTERNAL MEDICINE

## 2021-11-08 PROCEDURE — 99999 PR PBB SHADOW E&M-EST. PATIENT-LVL IV: CPT | Mod: PBBFAC,,, | Performed by: INTERNAL MEDICINE

## 2021-11-08 PROCEDURE — 99386 PR PREVENTIVE VISIT,NEW,40-64: ICD-10-PCS | Mod: 25,S$GLB,, | Performed by: INTERNAL MEDICINE

## 2021-11-08 PROCEDURE — 90686 FLU VACCINE (QUAD) GREATER THAN OR EQUAL TO 3YO PRESERVATIVE FREE IM: ICD-10-PCS | Mod: S$GLB,,, | Performed by: INTERNAL MEDICINE

## 2021-11-08 PROCEDURE — 3079F PR MOST RECENT DIASTOLIC BLOOD PRESSURE 80-89 MM HG: ICD-10-PCS | Mod: CPTII,S$GLB,, | Performed by: INTERNAL MEDICINE

## 2021-11-08 PROCEDURE — 80053 COMPREHEN METABOLIC PANEL: CPT | Performed by: INTERNAL MEDICINE

## 2021-11-08 PROCEDURE — 82306 VITAMIN D 25 HYDROXY: CPT | Performed by: INTERNAL MEDICINE

## 2021-11-08 PROCEDURE — 3008F BODY MASS INDEX DOCD: CPT | Mod: CPTII,S$GLB,, | Performed by: INTERNAL MEDICINE

## 2021-11-08 PROCEDURE — 3074F PR MOST RECENT SYSTOLIC BLOOD PRESSURE < 130 MM HG: ICD-10-PCS | Mod: CPTII,S$GLB,, | Performed by: INTERNAL MEDICINE

## 2021-11-08 PROCEDURE — 3074F SYST BP LT 130 MM HG: CPT | Mod: CPTII,S$GLB,, | Performed by: INTERNAL MEDICINE

## 2021-11-08 PROCEDURE — 80061 LIPID PANEL: CPT | Performed by: INTERNAL MEDICINE

## 2021-11-08 PROCEDURE — 99999 PR PBB SHADOW E&M-EST. PATIENT-LVL IV: ICD-10-PCS | Mod: PBBFAC,,, | Performed by: INTERNAL MEDICINE

## 2021-11-08 PROCEDURE — 3008F PR BODY MASS INDEX (BMI) DOCUMENTED: ICD-10-PCS | Mod: CPTII,S$GLB,, | Performed by: INTERNAL MEDICINE

## 2021-11-08 RX ORDER — ROSUVASTATIN CALCIUM 10 MG/1
10 TABLET, COATED ORAL NIGHTLY
Qty: 90 TABLET | Refills: 3 | Status: SHIPPED | OUTPATIENT
Start: 2021-11-08 | End: 2022-08-23 | Stop reason: SDUPTHER

## 2021-11-19 ENCOUNTER — PATIENT MESSAGE (OUTPATIENT)
Dept: PSYCHIATRY | Facility: CLINIC | Age: 61
End: 2021-11-19
Payer: COMMERCIAL

## 2021-12-21 ENCOUNTER — PATIENT MESSAGE (OUTPATIENT)
Dept: NEUROLOGY | Facility: CLINIC | Age: 61
End: 2021-12-21
Payer: COMMERCIAL

## 2021-12-21 ENCOUNTER — OFFICE VISIT (OUTPATIENT)
Dept: PSYCHIATRY | Facility: CLINIC | Age: 61
End: 2021-12-21
Payer: COMMERCIAL

## 2021-12-21 DIAGNOSIS — F33.1 MAJOR DEPRESSIVE DISORDER, RECURRENT EPISODE, MODERATE: ICD-10-CM

## 2021-12-21 DIAGNOSIS — F33.0 MDD (MAJOR DEPRESSIVE DISORDER), RECURRENT EPISODE, MILD: Primary | ICD-10-CM

## 2021-12-21 DIAGNOSIS — F41.1 GENERALIZED ANXIETY DISORDER: ICD-10-CM

## 2021-12-21 DIAGNOSIS — G89.4 CHRONIC PAIN SYNDROME: ICD-10-CM

## 2021-12-21 DIAGNOSIS — F51.04 CHRONIC INSOMNIA: ICD-10-CM

## 2021-12-21 DIAGNOSIS — G35 MULTIPLE SCLEROSIS: ICD-10-CM

## 2021-12-21 DIAGNOSIS — F34.1 DYSTHYMIC DISORDER: ICD-10-CM

## 2021-12-21 DIAGNOSIS — F41.1 GAD (GENERALIZED ANXIETY DISORDER): ICD-10-CM

## 2021-12-21 DIAGNOSIS — R53.82 CHRONIC FATIGUE: ICD-10-CM

## 2021-12-21 DIAGNOSIS — R41.840 CONCENTRATION DEFICIT: ICD-10-CM

## 2021-12-21 PROCEDURE — 1160F RVW MEDS BY RX/DR IN RCRD: CPT | Mod: CPTII,95,, | Performed by: PSYCHIATRY & NEUROLOGY

## 2021-12-21 PROCEDURE — 1160F PR REVIEW ALL MEDS BY PRESCRIBER/CLIN PHARMACIST DOCUMENTED: ICD-10-PCS | Mod: CPTII,95,, | Performed by: PSYCHIATRY & NEUROLOGY

## 2021-12-21 PROCEDURE — 90833 PSYTX W PT W E/M 30 MIN: CPT | Mod: 95,,, | Performed by: PSYCHIATRY & NEUROLOGY

## 2021-12-21 PROCEDURE — 1159F MED LIST DOCD IN RCRD: CPT | Mod: CPTII,95,, | Performed by: PSYCHIATRY & NEUROLOGY

## 2021-12-21 PROCEDURE — 99213 PR OFFICE/OUTPT VISIT, EST, LEVL III, 20-29 MIN: ICD-10-PCS | Mod: 95,,, | Performed by: PSYCHIATRY & NEUROLOGY

## 2021-12-21 PROCEDURE — 1159F PR MEDICATION LIST DOCUMENTED IN MEDICAL RECORD: ICD-10-PCS | Mod: CPTII,95,, | Performed by: PSYCHIATRY & NEUROLOGY

## 2021-12-21 PROCEDURE — 99213 OFFICE O/P EST LOW 20 MIN: CPT | Mod: 95,,, | Performed by: PSYCHIATRY & NEUROLOGY

## 2021-12-21 PROCEDURE — 90833 PR PSYCHOTHERAPY W/PATIENT W/E&M, 30 MIN (ADD ON): ICD-10-PCS | Mod: 95,,, | Performed by: PSYCHIATRY & NEUROLOGY

## 2021-12-21 RX ORDER — DEXTROAMPHETAMINE SACCHARATE, AMPHETAMINE ASPARTATE, DEXTROAMPHETAMINE SULFATE AND AMPHETAMINE SULFATE 5; 5; 5; 5 MG/1; MG/1; MG/1; MG/1
1 TABLET ORAL 2 TIMES DAILY
Qty: 60 TABLET | Refills: 0 | Status: SHIPPED | OUTPATIENT
Start: 2022-01-20 | End: 2022-05-30

## 2021-12-21 RX ORDER — QUETIAPINE FUMARATE 25 MG/1
TABLET, FILM COATED ORAL
Qty: 60 TABLET | Refills: 6 | Status: SHIPPED | OUTPATIENT
Start: 2021-12-21 | End: 2022-05-30 | Stop reason: SDUPTHER

## 2021-12-21 RX ORDER — QUETIAPINE FUMARATE 50 MG/1
50 TABLET, FILM COATED ORAL NIGHTLY
Qty: 90 TABLET | Refills: 1 | Status: SHIPPED | OUTPATIENT
Start: 2021-12-21 | End: 2022-05-30 | Stop reason: SDUPTHER

## 2021-12-21 RX ORDER — DEXTROAMPHETAMINE SACCHARATE, AMPHETAMINE ASPARTATE, DEXTROAMPHETAMINE SULFATE AND AMPHETAMINE SULFATE 5; 5; 5; 5 MG/1; MG/1; MG/1; MG/1
1 TABLET ORAL 2 TIMES DAILY
Qty: 60 TABLET | Refills: 0 | Status: SHIPPED | OUTPATIENT
Start: 2021-12-21 | End: 2022-05-30

## 2021-12-21 RX ORDER — DULOXETIN HYDROCHLORIDE 60 MG/1
120 CAPSULE, DELAYED RELEASE ORAL DAILY
Qty: 60 CAPSULE | Refills: 12 | Status: SHIPPED | OUTPATIENT
Start: 2021-12-21 | End: 2022-08-22

## 2021-12-21 RX ORDER — BUPROPION HYDROCHLORIDE 300 MG/1
300 TABLET ORAL DAILY
Qty: 90 TABLET | Refills: 3 | Status: SHIPPED | OUTPATIENT
Start: 2021-12-21 | End: 2022-08-22

## 2021-12-21 NOTE — PROGRESS NOTES
"Outpatient Psychiatry Follow-Up Visit (MD/NP) -- telemedicine visit  12/21/2021    The patient location is: [place] ([place], [place]).  The chief complaint leading to consultation is: see below    Visit type: audiovisual    Face to Face time with patient: 30 minutes  40 minutes of total time spent on the encounter, which includes face to face time and non-face to face time preparing to see the patient (eg, review of tests), Obtaining and/or reviewing separately obtained history, Documenting clinical information in the electronic or other health record, Independently interpreting results (not separately reported) and communicating results to the patient/family/caregiver, or Care coordination (not separately reported).     Each patient to whom he or she provides medical services by telemedicine is:  (1) informed of the relationship between the physician and patient and the respective role of any other health care provider with respect to management of the patient; and (2) notified that he or she may decline to receive medical services by telemedicine and may withdraw from such care at any time.      Session Length:  30 minutes (E&M)     Clinical Status of Patient: Outpatient (Ambulatory)    Chief Complaint: Karen D Eleser is a 61 y.o. female who presents today for follow-up of chronic depression and anxiety.    Met with patient.     Interval History and Content of Current Session:  Interim Events/Subjective Report/Content of Current Session:  First f/u appt with me since 4/29/2021.  Pt had seen Dr. Tash Clark in the past; last appointment was on 9/28/2015 (this note was reviewed).      Plan from last session:  "Continue Cymbalta 120 mg daily  Continue Wellbutrin  mg total daily.  Pt has denied Hx of seizures.   Restart Seroquel 50 mg one tablet qhs (for depression/mood Sx and insomnia).    Continue Xanax 0.5 mg BID prn anxiety; can also use for sleep.    Avoid use of Adderall due to increased risk to CV " "system.  Pt can also take OTC Benadryl or melatonin for sleep prn.   Encourage individual psychotherapy for support. Patient had been meeting with LCSW in Neurology clinic; she can continue this (if insurance allows) or consider f/u with Dr. Nery Toledo or other therapist in our clinic."    Doing "OK" currently.    She states she gets out of the house "a little bit".    She did get the COVID vaccine at [facility] -- completed 2nd shot 4/8/21.      Current SIGECAPS:    Sleep -- falls asleep OK; has had more nights recently when she awakens and has trouble falling back asleep.  She may take Xanax or tizanidine.    Interests -- low in general.  Still does not do much -- tends to stay at home, watch TV or read.      Guilt -- for "enabling my sister for so many years", not showing tough love.  Similar feelings with her mom (had to say "no" to her; feelings regarding circumstances surrounding her death).    Energy -- fluctuates; some days OK, some < normal, easily fatigued.     Concentration -- poor due to MS; has a hard time reading      Appetite -- "fine"   Psychomotor --  Somewhat decreased   Suicidal ideation -- denies      She thinks her current meds are working well.    She denies any new medical issues.    She still has chronic pain in her legs/knees -- takes Tramadol for severe pain.    She also resumed Xanax 0.5 mg BID.      Even prior to COVID, she mostly isolated self at home, left house infrequently mainly to go to the grocery store.    The only significant change due to COVID was that she must wear a mask in public places.      She becomes depressed at times.     Will take Adderall -- it gives her energy, but also makes her feel jittery, especially when she takes an entire tablet without food.    She has NOT been taking the Adderall everyday, just when needed for extra energy or concentration.       She continues to see her pain management specialist at [facility] on [place]. " "   She has used medical marijuana -- she takes it orally under her tongue (see below).      She has chronic painful neuropathy in her feet, legs due to lumbar disc herniation.    She states she has used less Tramadol as a result.      I have encouraged pt to keep in touch with others (friends, relatives).      Past psych meds: celexa, lexapro, and effexor in the past.    From previous sessions:  Her father had endocarditis when he was younger.  He then had to have a pacemaker, then dual pacemaker/defibrillator.  He also had 3 sisters (her aunts) who all had heart disease (I did not asked if they smoked).  He had 3 older brothers who have been healthy.  She denies heart disease on her mother's side.    Her sister  from a massive MI at age 57 yo.  However, she abused pills and OTC meds and had gone to rehab at [facility] just months prior to her death.  She notes she and her sister were very close; they even lived together for a while.    Pt has denied ever having problems with her heart.  She stated she never had a stress test, but she has had "plenty of" EKG's.   --Pt had neuropsychological testing on 19 with Dr. Butt for interpretation.  It did not show anything significant besides some slowing of processing speed thought likely due to the MS.   She had been treated by Dr. Royce Pina for MS until his death in .  She has also been treated by other neurologists.  Dr. Maia Minaya is her neurologist currently.   --Regarding her mom and sister:  Mother passed: 2015. Sister passed: 2014. Sister had problems with drugs and alcohol, was a nurse and lost her nursing license.  She  from a massive MI while in the senior living house after substance rehab.  She was close to them both.  She had enjoyed going to the movies, going out to eat, travel, etc.  However, she had always done these things with her sister, who  in 2014.  She has no one in her life that she is close to compared to her " "sister in the past.  She has a brother who is 6 years younger -- "he can be very abrasive, he talks real loud".  "He also has his life, his children".  She also notes he does not understand or appreciate her mental illness.   --Regarding past intimate relationships: She has had just one very serious relationship that lasted for 10 years.  She states the relationship ended because he lied to her & told her that he worked and stayed at his mother's house, caring for her.  However, he did not work, lived off his mother's SS, then was kicked out of the mom's trailer by his siblings after she , so he was homeless for a while.  She thinks he may be living with his ex-wife now.  She states this happened about 5 years ago.    Since then, she had met 2 guys on line ([employer]) -- went out with each of them a few times, but nothing ever came of this.    She notes her mother was very controlling.  Pt also was shy growing up.  Pt denies ever being sexually molested.  She denies ever having problems with alcohol or drugs.  She is out on disability from work ([employer]) as a .    She is still getting a check through her work disability co.     PSYCHOTHERAPY ADD-ON [id number]   30 (16-37*) minutes   · Target symptoms: depression, anxiety, fatigue  · Why chosen therapy is appropriate versus another modality: relevant to diagnosis, patient responds to this modality  · Outcome monitoring methods: self-report  · Therapeutic intervention type: supportive psychotherapy  · Topics discussed/themes: stress related to medical comorbidities, life stage transitional issues, grief, social isolation, self care/nurturing.  · The patient's response to the intervention is accepting.   · The patient's progress toward treatment goals is fair.  · Duration of intervention: 16 minutes      Review of Systems   · PSYCHIATRIC: Pertinant items are noted in the narrative.  · CONSTITUTIONAL: Some recent wt fluctuations.  Chronic fatigue.  "    · MUSCULOSKELETAL:  Chronic generalized aches/pains.   · NEUROLOGIC: Some occasional mild numbness and tingling in her arms.  + chronic nuropathic pain in her feet/legs.  No weakness, seizures, confusion, memory loss, tremor or other abnormal movements.  · ENDOCRINE: No polydipsia or polyuria.  · INTEGUMENTARY: No rashes or lacerations.  · EYES: No exophthalmos, jaundice or blindness.  · ENT: No dizziness, tinnitus or hearing loss.  · RESPIRATORY: No shortness of breath.  · CARDIOVASCULAR: No tachycardia or chest pain.  · GASTROINTESTINAL: No nausea, vomiting, pain, constipation or diarrhea.  · GENITOURINARY: No frequency, dysuria.      Past Medical, Family and Social History: The patient's past medical, family and social history have been reviewed and updated as appropriate within the electronic medical record -- see encounter notes.       Current Outpatient Medications:     ALPRAZolam (XANAX) 0.5 MG tablet, TAKE 1 TABLET BY MOUTH UP TO TWICE DAILY AS NEEDED FOR ANXIETY, Disp: 60 tablet, Rfl: 5    buPROPion (WELLBUTRIN XL) 300 MG 24 hr tablet, Take 1 tablet (300 mg total) by mouth once daily., Disp: 90 tablet, Rfl: 3    cholecalciferol, vitamin D3, (VITAMIN D3) 5,000 unit Tab, Take 5,000 Units by mouth once daily. , Disp: , Rfl:     DENTA 5000 PLUS 1.1 % Crea, USE AS DIRECTED DAILY, Disp: , Rfl:     dextroamphetamine-amphetamine (ADDERALL) 20 mg tablet, Take 1 tablet by mouth 2 (two) times daily., Disp: 60 tablet, Rfl: 0    dextroamphetamine-amphetamine (ADDERALL) 20 mg tablet, Take 1 tablet by mouth 2 (two) times daily., Disp: 60 tablet, Rfl: 0    diclofenac sodium (VOLTAREN) 1 % Gel, Apply 2 g topically 4 (four) times daily., Disp: 5 Tube, Rfl: 0    DULoxetine (CYMBALTA) 60 MG capsule, TAKE 2 CAPSULES BY MOUTH ONCE DAILY, Disp: 180 capsule, Rfl: 0    FLUZONE QUAD 9032-0790, PF, 60 mcg (15 mcg x 4)/0.5 mL Syrg, TO BE ADMINISTERED BY PHARMACIST FOR IMMUNIZATION, Disp: , Rfl: 0    gabapentin (NEURONTIN)  600 MG tablet, TAKE 2 TABLETS EVERY MORNING, 1 TABLET AT NOON AND 2 TABLETS AT BEDTIME, Disp: 450 tablet, Rfl: 1    glatiramer (COPAXONE, GLATOPA) 40 mg/mL injection, INJECT ONE SYRINGE SUBCUTANEOUSLY 3 TIMES A WEEK AT LEAST 48 HOURS APART. ALLOW TO WARM TO ROOM TEMP FOR 20 MINUTES. REFRIGERATE., Disp: 36 Syringe, Rfl: 1    HYDROcodone-acetaminophen (NORCO) 5-325 mg per tablet, Take 1 tablet by mouth 3 (three) times daily as needed., Disp: , Rfl: 0    latanoprost 0.005 % ophthalmic solution, Place 1 drop into both eyes every evening., Disp: 7.5 mL, Rfl: 2    meloxicam (MOBIC) 15 MG tablet, TAKE 1 TABLET BY MOUTH EVERY DAY, Disp: 90 tablet, Rfl: 0    multivitamin (THERAGRAN) per tablet, Take by mouth. 1 Tablet Oral Every day, Disp: , Rfl:     PREVIDENT 5000 SENSITIVE 1.1-5 % Pste, Apply topically once daily. , Disp: , Rfl:     QUEtiapine (SEROQUEL) 50 MG tablet, TAKE 1 TABLET BY MOUTH EVERY DAY IN THE EVENING, Disp: 90 tablet, Rfl: 0    rosuvastatin (CRESTOR) 10 MG tablet, Take 1 tablet (10 mg total) by mouth every evening., Disp: 90 tablet, Rfl: 3    solifenacin (VESICARE) 5 MG tablet, TAKE 1 TABLET BY MOUTH EVERY DAY, Disp: 90 tablet, Rfl: 3    tiZANidine (ZANAFLEX) 2 MG tablet, TAKE 1/2 TABLET BY MOUTH TWICE A DAY THEN 2 TABS AT BEDTIME AS NEEDED, Disp: 270 tablet, Rfl: 0    traMADol (ULTRAM) 50 mg tablet, Take 50 mg by mouth 4 (four) times daily., Disp: , Rfl: 1    She occasionally takes Xanax -- takes occasional one tab in day for anxiety; will take one at night to help her relax and fall asleep.  She also takes tizanidine 2 tabs nightly for sleep and chronic pain.         She takes Adderall occasionally when she needs to concentrate or needs energy.  Gabapentin has been Rx by Dr. Rios; she is taking as above as prescribed -- it is for peripheral neuropathy in her legs/feet.   She also takes Tramadol one tablet up to QID for low back and knee pain.      She also takes medical marijuana that is  ""prescribed" by her pain management doctor and dispensed by a pharmacy in [place].  It is a tincture in a bottle with a dropper.  It is labelled "" 1 ml dropper that she can take up to TID (takes for pain).  She states it helps with alleviate the pain; she estimates it provides about 50% pain relief.    She takes the THC in conjunction with oral CBD oil -- "(the CBD oil) gives that extra boost".    With this combination, she has needed to use less Tramadol or Norco.    Compliance: yes    Side effects: Ambien -- visual hallucinations and illusions, sleepwalking/sleepeating.     Risk Parameters:  Patient reports no suicidal ideation  Patient reports no homicidal ideation  Patient reports no self-injurious behavior  Patient reports no violent behavior    Exam (detailed: at least 9 elements; comprehensive: all 15 elements)    Constitutional  Vitals:  Most recent vital signs, dated less than 90 days prior to this appointment, were reviewed:   NO VITALS DONE TODAY -- VIRTUAL SESSION ONLY    Vitals - 1 value per visit 12/8/2020 6/8/2021 9/27/2021 11/8/2021   SYSTOLIC 135   120   DIASTOLIC 84   80   PULSE 69   109   TEMPERATURE       RESPIRATIONS    16   SPO2    98   Weight (lb) 159.5 150 149.91 158.51   Weight (kg) 72.35 68.04 68 71.9   HEIGHT 5' 0"  5' 0" 5' 0"   BODY MASS INDEX 31.15 29.29 29.28 30.96   VISIT REPORT       Pain Score  5   0       Vitals - 1 value per visit 10/1/2019 10/16/2019 10/30/2019 2/11/2020   SYSTOLIC 123 131 99 143   DIASTOLIC 85 79 65 82   PULSE 78 91 70 84   TEMPERATURE   98.2    SPO2       Weight (lb) 153.22 153 151 154.1   Weight (kg) 69.5 69.4 68.493 69.9   HEIGHT 5' 0" 5' 0" 5' 0" 5' 0"   BODY MASS INDEX 29.92 29.88 29.49 30.1   VISIT REPORT       Pain Score  7  6         General:  unremarkable, age appropriate, well dressed, neatly groomed     Musculoskeletal  Muscle Strength/Tone:  N/A (virtual session only)   Gait & Station:  N/A (virtual session only)     Psychiatric  Speech:  " "normal tone, normal rate, normal pitch, normal volume, spontaneous    Mood & Affect:  "OK"  congruent and appropriate to situation/content.    Thought Process:  normal and logical, mildly circumstantial   Associations:  intact    Thought Content:  normal, no suicidality, no homicidality, delusions, or paranoia    Insight & Judgement:  good, intact  adequate to circumstances, age appropriate, good    Orientation:  grossly intact, person, place, time/date, situation    Memory:  intact for content of interview, grossly intact    Language:  grossly intact    Attention Span & Concentration:  able to focus    Fund of Knowledge:  intact and appropriate to age and level of education, familiar with aspects of current personal life      Prior EKG:  Test Reason : Z79.899  Vent. Rate : 077 BPM     Atrial Rate : 077 BPM     P-R Int : 136 ms          QRS Dur : 090 ms      QT Int : 406 ms       P-R-T Axes : 033 000 073 degrees     QTc Int : 459 ms  Normal sinus rhythm  Poor R wave progression  Abnormal ECG  When compared with ECG of 21-JUL-2008 08:45,  Nonspecific T wave abnormality no longer evident in Inferior leads  QT has lengthened  Confirmed by Yaneli Ferguson MD ([id number]) on 8/11/2017 4:36:19 PM      Assessment and Diagnosis    Status/Progress: Based on the examination today, the patient's problem(s) is/are adequately, but not ideally controlled. New problems have not been presented today. Comorbidities (chronic neuropathic pain) are complicating management of the primary condition. There are no active rule-out diagnoses for this patient at this time.    General Impression:   Major Depressive Disorder, Recurrent: Mild  Dysthymic Disorder   Generalized Anxiety Disorder  Chronic Insomnia   Multiple Sclerosis (NOT CODED)   Also: chronic pain, CHELSEA    Intervention/Counseling/Treatment Plan    Current medication management:  Continue all current meds as noted above.     Additional Notes:  Encouraged individual psychotherapy for " support. Patient had been meeting with LCSW in Neurology clinic; she can continue this (if insurance allows) or consider f/u with Dr. Nery Toledo or other therapist in our clinic.     Return to Clinic: 6 months, or sooner prn.

## 2022-02-21 ENCOUNTER — OFFICE VISIT (OUTPATIENT)
Dept: OPTOMETRY | Facility: CLINIC | Age: 62
End: 2022-02-21
Payer: COMMERCIAL

## 2022-02-21 DIAGNOSIS — H21.562 AFFERENT PUPILLARY DEFECT OF LEFT EYE: ICD-10-CM

## 2022-02-21 DIAGNOSIS — H40.053 OCULAR HYPERTENSION, BILATERAL: Primary | ICD-10-CM

## 2022-02-21 DIAGNOSIS — G35 MULTIPLE SCLEROSIS: ICD-10-CM

## 2022-02-21 DIAGNOSIS — Z98.890 HISTORY OF LASER REFRACTIVE SURGERY: ICD-10-CM

## 2022-02-21 DIAGNOSIS — H52.4 PRESBYOPIA OF BOTH EYES: ICD-10-CM

## 2022-02-21 DIAGNOSIS — H52.203 ASTIGMATISM OF BOTH EYES, UNSPECIFIED TYPE: ICD-10-CM

## 2022-02-21 PROCEDURE — 92015 DETERMINE REFRACTIVE STATE: CPT | Mod: S$GLB,,, | Performed by: OPTOMETRIST

## 2022-02-21 PROCEDURE — 1159F PR MEDICATION LIST DOCUMENTED IN MEDICAL RECORD: ICD-10-PCS | Mod: CPTII,S$GLB,, | Performed by: OPTOMETRIST

## 2022-02-21 PROCEDURE — 99999 PR PBB SHADOW E&M-EST. PATIENT-LVL IV: ICD-10-PCS | Mod: PBBFAC,,, | Performed by: OPTOMETRIST

## 2022-02-21 PROCEDURE — 92015 PR REFRACTION: ICD-10-PCS | Mod: S$GLB,,, | Performed by: OPTOMETRIST

## 2022-02-21 PROCEDURE — 1159F MED LIST DOCD IN RCRD: CPT | Mod: CPTII,S$GLB,, | Performed by: OPTOMETRIST

## 2022-02-21 PROCEDURE — 99999 PR PBB SHADOW E&M-EST. PATIENT-LVL IV: CPT | Mod: PBBFAC,,, | Performed by: OPTOMETRIST

## 2022-02-21 PROCEDURE — 92014 PR EYE EXAM, EST PATIENT,COMPREHESV: ICD-10-PCS | Mod: S$GLB,,, | Performed by: OPTOMETRIST

## 2022-02-21 PROCEDURE — 92014 COMPRE OPH EXAM EST PT 1/>: CPT | Mod: S$GLB,,, | Performed by: OPTOMETRIST

## 2022-02-21 RX ORDER — HYDROCORTISONE 25 MG/G
CREAM TOPICAL
COMMUNITY
Start: 2021-12-03

## 2022-02-21 NOTE — PATIENT INSTRUCTIONS
Prior finding of bilateral ocular hypertension   Good response to latanoprost in each eye.  IOP now well within normal range (14 mm in each eye today - note pre-treatment IOP of 28 mm Hg in the right eye and 27 mm Hg in the left eye).  HVF test done on previous visit.  See previous notes.  Suggest repeat Dodd Visual Field (HVF) test (24-2) at this time.   Assistant to call to schedule HVF test and follow up visit with me (Dr. Murray) on the same date.  In the interim,.continue to instill one drop of Latanoprost 0.005% ophthalmic solution into each eye once per day.    Prior diagnosis of multiple sclerosis.  Note mild afferent pupil defect in the left eye, suggestive of prior episode of optic neuritis in the left eye (secondary to MS).    Astigmatic refractive error in each eye, with very satisfactory best-corrected VA in each eye.  Presbyopia consistent with age.  New spectacle lens Rx issued for use as desired.  Recommend full-time wear.     Repeat refraction in one year.

## 2022-02-21 NOTE — PROGRESS NOTES
HPI     eye examination      Additional comments: General eye examination and refraction.  History of multiple sclerosis.  No known history of optic neuritis.  Wears glasses full-time.  VA with glasses okay.               Comments     Patient's age: 61 y.o. WF   Occupation: Disabled due to MS  Approximate date of last eye examination:  12/08/2020  Name of last eye doctor seen: Dr. Murray  Wears glasses? Yes      If yes, wears  Full-time or part-time?  full time   Present glasses are: Bifocal, SV Distance, SV Reading?  Bifocals lens -   did not change lenses following last refraction.   Any problem with VA with glasses?  no  Wears CLs?:  no   Headaches?  no   Eye pain/discomfort?  no                                                                                     Flashes?  no   Floaters?  no  Diplopia/Double vision?  no   Patient's Ocular History:          Any eye surgeries? LASIK in  2000 ([facility])         Any eye injury?  no          Any treatment for eye disease?   bilateral ocular hypertension -has   been  using Latanoprost 0.005% ophthalmic solution in both eyes daily  Family history of eye disease?  no   Significant patient medical history:         1. Diabetes?  no      If yes, IDDM or NIDDM?  n/a        2. HBP?  no               3. Other (describe):  Multiple Sclerosis, Fibromyalgia    ! OTC eyedrops currently using:  no    ! Prescription eye meds currently using:  Latanoprost daily at night OU    ! Any history of allergy/adverse reaction to any eye meds used   previously?  no    ! Any history of allergy/adverse reaction to eyedrops used during prior   eye exam(s)? no    ! Any history of allergy/adverse reaction to Novacaine or similar meds?   no    ! Any history of allergy/adverse reaction to Epinephrine or similar meds?   no    ! Patient okay with use of anesthetic eyedrops to check eye pressure?    yes        ! Patient okay with use of eyedrops to dilate pupils today?  yes    !  " Allergies/Medications/Medical History/Family History reviewed today?    yes       PD =    65/61  Desired reading distance =  16" (+/-)                                        Last edited by Tony Murray, OD on 2/21/2022  3:07 PM. (History)            Assessment /Plan     For exam results, see Encounter Report.    1. Ocular hypertension, bilateral  Dodd Visual Field - OU - Extended - Both Eyes   2. Afferent pupillary defect of left eye     3. Multiple sclerosis     4. History of laser refractive surgery     5. Astigmatism of both eyes, unspecified type     6. Presbyopia of both eyes                    Prior finding of bilateral ocular hypertension   Good response to latanoprost in each eye.  IOP now well within normal range (14 mm in each eye today - note pre-treatment IOP of 28 mm Hg in the right eye and 27 mm Hg in the left eye).  HVF test done on previous visit.  See previous notes.  Suggest repeat Dodd Visual Field (HVF) test (24-2) at this time.   Assistant to call to schedule HVF test and follow up visit with me (Dr. Murray) on the same date.  In the interim,.continue to instill one drop of Latanoprost 0.005% ophthalmic solution into each eye once per day.    Prior diagnosis of multiple sclerosis.  Note mild afferent pupil defect in the left eye, suggestive of prior episode of optic neuritis in the left eye (secondary to MS).    Astigmatic refractive error in each eye, with very satisfactory best-corrected VA in each eye.  Presbyopia consistent with age.  New spectacle lens Rx issued for use as desired.  Recommend full-time wear.     Repeat refraction in one year.                "

## 2022-02-25 ENCOUNTER — TELEPHONE (OUTPATIENT)
Dept: NEUROLOGY | Facility: CLINIC | Age: 62
End: 2022-02-25
Payer: COMMERCIAL

## 2022-02-28 ENCOUNTER — PATIENT MESSAGE (OUTPATIENT)
Dept: PSYCHIATRY | Facility: CLINIC | Age: 62
End: 2022-02-28
Payer: COMMERCIAL

## 2022-03-07 ENCOUNTER — TELEPHONE (OUTPATIENT)
Dept: PSYCHIATRY | Facility: CLINIC | Age: 62
End: 2022-03-07
Payer: COMMERCIAL

## 2022-03-07 NOTE — TELEPHONE ENCOUNTER
Faxed completed LTD forms and records to [facility] at [phone].  Sent copy to [facility] and to pt via [employer].

## 2022-03-24 ENCOUNTER — PATIENT MESSAGE (OUTPATIENT)
Dept: PSYCHIATRY | Facility: CLINIC | Age: 62
End: 2022-03-24
Payer: COMMERCIAL

## 2022-04-20 ENCOUNTER — PATIENT MESSAGE (OUTPATIENT)
Dept: NEUROLOGY | Facility: CLINIC | Age: 62
End: 2022-04-20
Payer: COMMERCIAL

## 2022-04-20 DIAGNOSIS — G35 MS (MULTIPLE SCLEROSIS): ICD-10-CM

## 2022-04-21 ENCOUNTER — PATIENT MESSAGE (OUTPATIENT)
Dept: NEUROLOGY | Facility: CLINIC | Age: 62
End: 2022-04-21
Payer: COMMERCIAL

## 2022-04-25 RX ORDER — GABAPENTIN 600 MG/1
TABLET ORAL
Qty: 450 TABLET | Refills: 1 | Status: SHIPPED | OUTPATIENT
Start: 2022-04-25 | End: 2022-08-18

## 2022-05-30 ENCOUNTER — OFFICE VISIT (OUTPATIENT)
Dept: PSYCHIATRY | Facility: CLINIC | Age: 62
End: 2022-05-30
Payer: COMMERCIAL

## 2022-05-30 DIAGNOSIS — F33.0 MDD (MAJOR DEPRESSIVE DISORDER), RECURRENT EPISODE, MILD: Primary | ICD-10-CM

## 2022-05-30 DIAGNOSIS — F41.1 GENERALIZED ANXIETY DISORDER: ICD-10-CM

## 2022-05-30 DIAGNOSIS — R53.82 CHRONIC FATIGUE: ICD-10-CM

## 2022-05-30 DIAGNOSIS — F33.1 MAJOR DEPRESSIVE DISORDER, RECURRENT EPISODE, MODERATE: ICD-10-CM

## 2022-05-30 DIAGNOSIS — G35 MULTIPLE SCLEROSIS: ICD-10-CM

## 2022-05-30 DIAGNOSIS — F51.04 CHRONIC INSOMNIA: ICD-10-CM

## 2022-05-30 DIAGNOSIS — F34.1 DYSTHYMIC DISORDER: ICD-10-CM

## 2022-05-30 DIAGNOSIS — R41.840 CONCENTRATION DEFICIT: ICD-10-CM

## 2022-05-30 DIAGNOSIS — F41.1 GAD (GENERALIZED ANXIETY DISORDER): ICD-10-CM

## 2022-05-30 PROCEDURE — 1160F PR REVIEW ALL MEDS BY PRESCRIBER/CLIN PHARMACIST DOCUMENTED: ICD-10-PCS | Mod: CPTII,95,, | Performed by: PSYCHIATRY & NEUROLOGY

## 2022-05-30 PROCEDURE — 1160F RVW MEDS BY RX/DR IN RCRD: CPT | Mod: CPTII,95,, | Performed by: PSYCHIATRY & NEUROLOGY

## 2022-05-30 PROCEDURE — 99213 PR OFFICE/OUTPT VISIT, EST, LEVL III, 20-29 MIN: ICD-10-PCS | Mod: 95,,, | Performed by: PSYCHIATRY & NEUROLOGY

## 2022-05-30 PROCEDURE — 1159F PR MEDICATION LIST DOCUMENTED IN MEDICAL RECORD: ICD-10-PCS | Mod: CPTII,95,, | Performed by: PSYCHIATRY & NEUROLOGY

## 2022-05-30 PROCEDURE — 99213 OFFICE O/P EST LOW 20 MIN: CPT | Mod: 95,,, | Performed by: PSYCHIATRY & NEUROLOGY

## 2022-05-30 PROCEDURE — 1159F MED LIST DOCD IN RCRD: CPT | Mod: CPTII,95,, | Performed by: PSYCHIATRY & NEUROLOGY

## 2022-05-30 RX ORDER — QUETIAPINE FUMARATE 25 MG/1
TABLET, FILM COATED ORAL
Qty: 60 TABLET | Refills: 6 | Status: SHIPPED | OUTPATIENT
Start: 2022-05-30 | End: 2023-01-26 | Stop reason: SDUPTHER

## 2022-05-30 RX ORDER — QUETIAPINE FUMARATE 50 MG/1
50 TABLET, FILM COATED ORAL NIGHTLY
Qty: 90 TABLET | Refills: 1 | Status: SHIPPED | OUTPATIENT
Start: 2022-05-30 | End: 2023-01-26 | Stop reason: SDUPTHER

## 2022-05-30 RX ORDER — ALPRAZOLAM 0.5 MG/1
TABLET ORAL
Qty: 60 TABLET | Refills: 5 | Status: SHIPPED | OUTPATIENT
Start: 2022-05-30 | End: 2023-01-04 | Stop reason: SDUPTHER

## 2022-05-30 NOTE — PROGRESS NOTES
"Outpatient Psychiatry Follow-Up Visit (MD/NP) -- telemedicine visit  05/30/2022    The patient location is: [place] ([place], [place]).  The chief complaint leading to consultation is: see below    Visit type: audiovisual    Face to Face time with patient: 30 minutes  40 minutes of total time spent on the encounter, which includes face to face time and non-face to face time preparing to see the patient (eg, review of tests), Obtaining and/or reviewing separately obtained history, Documenting clinical information in the electronic or other health record, Independently interpreting results (not separately reported) and communicating results to the patient/family/caregiver, or Care coordination (not separately reported).     Each patient to whom he or she provides medical services by telemedicine is:  (1) informed of the relationship between the physician and patient and the respective role of any other health care provider with respect to management of the patient; and (2) notified that he or she may decline to receive medical services by telemedicine and may withdraw from such care at any time.      Session Length:  30 minutes (E&M)     Clinical Status of Patient: Outpatient (Ambulatory)    Chief Complaint: Karen D Eleser is a 61 y.o. female who presents today for follow-up of chronic depression and anxiety.    Met with patient.     Interval History and Content of Current Session:  Interim Events/Subjective Report/Content of Current Session:  First f/u appt with me since 12/21/2021.  Pt had seen Dr. Tash Clark in the past; last appointment was on 9/28/2015 (this note was reviewed).      Plan from last session:  "Continue all current meds as noted above."    Pt states she has been having a lot of lower back pain.    She plans to have another rhizotomy on her lower back due to uncontrolled lower back pain.    She still spends a lot of her time at home, but lately primarily due to her back.      The last couple of weeks " "she has been down due to the pain.    She has chronic painful neuropathy in her feet, legs due to lumbar disc herniation.    She states she has used less Tramadol as a result.         She went to [facility] for a convention ([facility]).  However, this is when she hurt her back (too much standing and walking), so she had to spend much of her time laid up in her hotel room and was unable to see the sights.  She also had to get proof that she was COVID negative before she could fly out from [place].         She states she plans to go to [facility], for July 4th for another convention ([facility]).   She is hoping she can have the procedure before she leaves for this trip.  She has been to [place] before, so she is familiar with how the city is arranged; "it's much easier to get around" (compared to a city like [place]).         Current SIGECAPS:    Sleep -- falls asleep OK; has had more nights recently when she awakens and has trouble falling back asleep.  Occasionally takes Xanax or tizanidine.    Interests -- low in general.  Still has low structure -- tends to stay at home, watch TV or read.      Guilt -- for some past events with close family members  Energy -- fluctuates; some days OK, some < normal, easily fatigued.     Concentration -- poor due to MS; has a hard time reading      Appetite -- "fine"   Psychomotor --  Somewhat decreased   Suicidal ideation -- denies      She thinks her current meds are working well.    She denies any new medical issues.    She still has chronic pain in her legs/knees -- takes Tramadol for severe pain.    She also resumed Xanax 0.5 mg BID.      Even prior to COVID, she mostly isolated self at home, left house infrequently mainly to go to the grocery store.    The only significant change due to COVID was that she must wear a mask in public places.      Will take Adderall -- it gives her energy, but also makes her feel jittery, especially when she takes an entire tablet without " "food.    She has NOT been taking the Adderall everyday, just when needed for extra energy or concentration.       She continues to see her pain management specialist at [facility] on [address].    She has used medical marijuana -- she has taken it orally under her tongue (see below).        I have encouraged pt to keep in touch with others (friends, relatives).      Past psych meds: celexa, lexapro, and effexor in the past.    From previous sessions:  Her father had endocarditis when he was younger.  He then had to have a pacemaker, then dual pacemaker/defibrillator.  He also had 3 sisters (her aunts) who all had heart disease (I did not asked if they smoked).  He had 3 older brothers who have been healthy.  She denies heart disease on her mother's side.    Her sister  from a massive MI at age 55 yo.  However, she abused pills and OTC meds and had gone to rehab at [facility] just months prior to her death.  She notes she and her sister were very close; they even lived together for a while.    Pt has denied ever having problems with her heart.  She stated she never had a stress test, but she has had "plenty of" EKG's.   --Pt had neuropsychological testing on 19 with Dr. Butt for interpretation.  It did not show anything significant besides some slowing of processing speed thought likely due to the MS.   She had been treated by Dr. Royce Pina for MS until his death in .  She has also been treated by other neurologists.  Dr. Maia Minaya is her neurologist currently.   --Regarding her mom and sister:  Mother passed: 2015. Sister passed: 2014. Sister had problems with drugs and alcohol, was a nurse and lost her nursing license.  She  from a massive MI while in the nursing home house after substance rehab.  She was close to them both.  She had enjoyed going to the movies, going out to eat, travel, etc.  However, she had always done these things with her sister, who  in " "2014.  She has no one in her life that she is close to compared to her sister in the past.  She has a brother who is 6 years younger -- "he can be very abrasive, he talks real loud".  "He also has his life, his children".  She also notes he does not understand or appreciate her mental illness.   --Regarding past intimate relationships: She has had just one very serious relationship that lasted for 10 years.  She states the relationship ended because he lied to her & told her that he worked and stayed at his mother's house, caring for her.  However, he did not work, lived off his mother's SS, then was kicked out of the mom's trailer by his siblings after she , so he was homeless for a while.  She thinks he may be living with his ex-wife now.  She states this happened about 5 years ago.    Since then, she had met 2 guys on line ([employer]) -- went out with each of them a few times, but nothing ever came of this.    She notes her mother was very controlling.  Pt also was shy growing up.  Pt denies ever being sexually molested.  She denies ever having problems with alcohol or drugs.  She is out on disability from work ([employer]) as a .    She is still getting a check through her work disability co.     PSYCHOTHERAPY ADD-ON +[id number]   30 (16-37*) minutes   · Target symptoms: depression, anxiety, fatigue  · Why chosen therapy is appropriate versus another modality: relevant to diagnosis, patient responds to this modality  · Outcome monitoring methods: self-report  · Therapeutic intervention type: supportive psychotherapy  · Topics discussed/themes: stress related to medical comorbidities, life stage transitional issues, grief, social isolation, self care/nurturing.  · The patient's response to the intervention is accepting.   · The patient's progress toward treatment goals is fair.  · Duration of intervention: 16 minutes      Review of Systems   · PSYCHIATRIC: Pertinant items are noted in the " narrative.  · CONSTITUTIONAL: Some recent wt fluctuations.  Chronic fatigue.     · MUSCULOSKELETAL:  Chronic generalized aches/pains.   · NEUROLOGIC: Some occasional mild numbness and tingling in her arms.  + chronic nuropathic pain in her feet/legs.  No weakness, seizures, confusion, memory loss, tremor or other abnormal movements.  · ENDOCRINE: No polydipsia or polyuria.  · INTEGUMENTARY: No rashes or lacerations.  · EYES: No exophthalmos, jaundice or blindness.  · ENT: No dizziness, tinnitus or hearing loss.  · RESPIRATORY: No shortness of breath.  · CARDIOVASCULAR: No tachycardia or chest pain.  · GASTROINTESTINAL: No nausea, vomiting, pain, constipation or diarrhea.  · GENITOURINARY: No frequency, dysuria.      Past Medical, Family and Social History: The patient's past medical, family and social history have been reviewed and updated as appropriate within the electronic medical record -- see encounter notes.       Current Outpatient Medications:     ALPRAZolam (XANAX) 0.5 MG tablet, TAKE 1 TABLET BY MOUTH UP TO TWICE DAILY AS NEEDED FOR ANXIETY, Disp: 60 tablet, Rfl: 5    buPROPion (WELLBUTRIN XL) 300 MG 24 hr tablet, Take 1 tablet (300 mg total) by mouth once daily., Disp: 90 tablet, Rfl: 3    cholecalciferol, vitamin D3, (VITAMIN D3) 5,000 unit Tab, Take 5,000 Units by mouth once daily. , Disp: , Rfl:     DENTA 5000 PLUS 1.1 % Crea, USE AS DIRECTED DAILY, Disp: , Rfl:     dextroamphetamine-amphetamine (ADDERALL) 20 mg tablet, Take 1 tablet by mouth 2 (two) times daily., Disp: 60 tablet, Rfl: 0    dextroamphetamine-amphetamine (ADDERALL) 20 mg tablet, Take 1 tablet by mouth 2 (two) times daily., Disp: 60 tablet, Rfl: 0    diclofenac sodium (VOLTAREN) 1 % Gel, Apply 2 g topically 4 (four) times daily., Disp: 5 Tube, Rfl: 0    DULoxetine (CYMBALTA) 60 MG capsule, Take 2 capsules (120 mg total) by mouth once daily., Disp: 60 capsule, Rfl: 12    FLUZONE QUAD [id number], PF, 60 mcg (15 mcg x 4)/0.5 mL  Syrg, TO BE ADMINISTERED BY PHARMACIST FOR IMMUNIZATION, Disp: , Rfl: 0    gabapentin (NEURONTIN) 600 MG tablet, TAKE 2 TABLETS EVERY MORNING, 1 TABLET AT NOON AND 2 TABLETS AT BEDTIME, Disp: 450 tablet, Rfl: 1    glatiramer (COPAXONE, GLATOPA) 40 mg/mL injection, INJECT ONE SYRINGE SUBCUTANEOUSLY 3 TIMES A WEEK AT LEAST 48 HOURS APART. ALLOW TO WARM TO ROOM TEMP FOR 20 MINUTES. REFRIGERATE., Disp: 12 each, Rfl: 1    HYDROcodone-acetaminophen (NORCO) 5-325 mg per tablet, Take 1 tablet by mouth 3 (three) times daily as needed., Disp: , Rfl: 0    hydrocortisone 2.5 % cream, Apply topically., Disp: , Rfl:     latanoprost 0.005 % ophthalmic solution, Place 1 drop into both eyes every evening., Disp: 7.5 mL, Rfl: 2    meloxicam (MOBIC) 15 MG tablet, TAKE 1 TABLET BY MOUTH EVERY DAY, Disp: 90 tablet, Rfl: 0    multivitamin (THERAGRAN) per tablet, Take by mouth. 1 Tablet Oral Every day, Disp: , Rfl:     PREVIDENT 5000 SENSITIVE 1.1-5 % Pste, Apply topically once daily. , Disp: , Rfl:     QUEtiapine (SEROQUEL) 25 MG Tab, Take oral 1 - 2 tablets qhs as needed for sleep, Disp: 60 tablet, Rfl: 6    QUEtiapine (SEROQUEL) 50 MG tablet, Take 1 tablet (50 mg total) by mouth every evening., Disp: 90 tablet, Rfl: 1    rosuvastatin (CRESTOR) 10 MG tablet, Take 1 tablet (10 mg total) by mouth every evening., Disp: 90 tablet, Rfl: 3    solifenacin (VESICARE) 5 MG tablet, TAKE 1 TABLET BY MOUTH EVERY DAY, Disp: 90 tablet, Rfl: 3    tiZANidine (ZANAFLEX) 2 MG tablet, TAKE 1/2 TABLET BY MOUTH TWICE A DAY THEN 2 TABS AT BEDTIME AS NEEDED, Disp: 270 tablet, Rfl: 0    traMADol (ULTRAM) 50 mg tablet, Take 50 mg by mouth 4 (four) times daily., Disp: , Rfl: 1    She occasionally takes Xanax -- takes occasional one tab in day for anxiety; will take one at night to help her relax and fall asleep.  She also takes tizanidine 2 tabs nightly for sleep and chronic pain.         Gabapentin has been Rx by Dr. Rios; she is taking as above  "as prescribed -- it is for peripheral neuropathy in her legs/feet.   She also takes Tramadol one tablet up to QID for low back and knee pain.      She had stated she took medical marijuana that is "prescribed" by her pain management doctor and dispensed by a pharmacy in [place].  It is a tincture in a bottle with a dropper.  It is labelled "" 1 ml dropper that she can take up to TID (takes for pain).  She states it helps with alleviate the pain; she estimates it provides about 50% pain relief.    She takes the THC in conjunction with oral CBD oil -- "(the CBD oil) gives that extra boost".    With this combination, she has needed to use less Tramadol or Norco.    Compliance: yes    Side effects: Ambien -- visual hallucinations and illusions, sleepwalking/sleepeating.     Risk Parameters:  Patient reports no suicidal ideation  Patient reports no homicidal ideation  Patient reports no self-injurious behavior  Patient reports no violent behavior    Exam (detailed: at least 9 elements; comprehensive: all 15 elements)    Constitutional  Vitals:  Most recent vital signs, dated less than 90 days prior to this appointment, were reviewed:   NO VITALS DONE TODAY -- VIRTUAL SESSION ONLY    Vitals - 1 value per visit 12/8/2020 6/8/2021 9/27/2021 11/8/2021   SYSTOLIC 135   120   DIASTOLIC 84   80   PULSE 69   109   TEMPERATURE       RESPIRATIONS    16   SPO2    98   Weight (lb) 159.5 150 149.91 158.51   Weight (kg) 72.35 68.04 68 71.9   HEIGHT 5' 0"  5' 0" 5' 0"   BODY MASS INDEX 31.15 29.29 29.28 30.96   VISIT REPORT       Pain Score  5   0       Vitals - 1 value per visit 10/1/2019 10/16/2019 10/30/2019 2/11/2020   SYSTOLIC 123 131 99 143   DIASTOLIC 85 79 65 82   PULSE 78 91 70 84   TEMPERATURE   98.2    SPO2       Weight (lb) 153.22 153 151 154.1   Weight (kg) 69.5 69.4 68.493 69.9   HEIGHT 5' 0" 5' 0" 5' 0" 5' 0"   BODY MASS INDEX 29.92 29.88 29.49 30.1   VISIT REPORT       Pain Score  7  6         General:  " "unremarkable, age appropriate, well dressed, neatly groomed     Musculoskeletal  Muscle Strength/Tone:  N/A (virtual session only)   Gait & Station:  N/A (virtual session only)     Psychiatric  Speech:  normal tone, normal rate, normal pitch, normal volume, spontaneous    Mood & Affect:  "OK"  congruent and appropriate to situation/content.    Thought Process:  normal and logical, mildly circumstantial   Associations:  intact    Thought Content:  normal, no suicidality, no homicidality, delusions, or paranoia    Insight & Judgement:  good, intact  adequate to circumstances, age appropriate, good    Orientation:  grossly intact, person, place, time/date, situation    Memory:  intact for content of interview, grossly intact    Language:  grossly intact    Attention Span & Concentration:  able to focus    Fund of Knowledge:  intact and appropriate to age and level of education, familiar with aspects of current personal life      Prior EKG:  Test Reason : Z79.899  Vent. Rate : 077 BPM     Atrial Rate : 077 BPM     P-R Int : 136 ms          QRS Dur : 090 ms      QT Int : 406 ms       P-R-T Axes : 033 000 073 degrees     QTc Int : 459 ms  Normal sinus rhythm  Poor R wave progression  Abnormal ECG  When compared with ECG of 21-JUL-2008 08:45,  Nonspecific T wave abnormality no longer evident in Inferior leads  QT has lengthened  Confirmed by Marty VEE, Yaneli ([id number]) on 8/11/2017 4:36:19 PM      Assessment and Diagnosis    Status/Progress: Based on the examination today, the patient's problem(s) is/are adequately, but not ideally controlled. New problems have not been presented today. Comorbidities (chronic neuropathic pain) are complicating management of the primary condition. There are no active rule-out diagnoses for this patient at this time.    General Impression:   Major Depressive Disorder, Recurrent: Mild  Dysthymic Disorder   Generalized Anxiety Disorder  Chronic Insomnia   Multiple Sclerosis (NOT CODED)   Also: " "chronic pain, CHELSEA    Intervention/Counseling/Treatment Plan    Current medication management:  Continue all current meds as noted above.   Two Rx for Adderall, each for a 30 day supply with no refills & with "Do not fill until" dates posted accordingly, were mailed to patient's home address as listed in EMR.      Additional Notes:  Encouraged individual psychotherapy for support. Patient had been meeting with LCSW in Neurology clinic; she can continue this (if insurance allows) or consider f/u with Dr. Neyr Toledo or other therapist in our clinic.     Return to Clinic: 6 months, or sooner prn.    "

## 2022-06-01 RX ORDER — DEXTROAMPHETAMINE SACCHARATE, AMPHETAMINE ASPARTATE, DEXTROAMPHETAMINE SULFATE AND AMPHETAMINE SULFATE 5; 5; 5; 5 MG/1; MG/1; MG/1; MG/1
1 TABLET ORAL 2 TIMES DAILY
Qty: 60 TABLET | Refills: 0 | Status: SHIPPED | OUTPATIENT
Start: 2022-06-01 | End: 2023-01-26 | Stop reason: SDUPTHER

## 2022-06-01 RX ORDER — DEXTROAMPHETAMINE SACCHARATE, AMPHETAMINE ASPARTATE, DEXTROAMPHETAMINE SULFATE AND AMPHETAMINE SULFATE 5; 5; 5; 5 MG/1; MG/1; MG/1; MG/1
1 TABLET ORAL 2 TIMES DAILY
Qty: 60 TABLET | Refills: 0 | Status: SHIPPED | OUTPATIENT
Start: 2022-07-01 | End: 2023-01-26 | Stop reason: SDUPTHER

## 2022-06-24 ENCOUNTER — PATIENT MESSAGE (OUTPATIENT)
Dept: PSYCHIATRY | Facility: CLINIC | Age: 62
End: 2022-06-24
Payer: COMMERCIAL

## 2022-08-16 DIAGNOSIS — G35 MULTIPLE SCLEROSIS: ICD-10-CM

## 2022-08-16 DIAGNOSIS — R25.2 SPASTICITY: ICD-10-CM

## 2022-08-18 RX ORDER — TIZANIDINE 2 MG/1
TABLET ORAL
Qty: 270 TABLET | Refills: 0 | Status: SHIPPED | OUTPATIENT
Start: 2022-08-18 | End: 2022-12-21

## 2022-08-23 RX ORDER — ROSUVASTATIN CALCIUM 10 MG/1
10 TABLET, COATED ORAL NIGHTLY
Qty: 90 TABLET | Refills: 3 | Status: SHIPPED | OUTPATIENT
Start: 2022-08-23 | End: 2023-03-16 | Stop reason: SDUPTHER

## 2022-08-23 NOTE — TELEPHONE ENCOUNTER
No new care gaps identified.  [facility] Embedded Care Gaps. Reference number: [id number]. 8/23/2022   2:29:30 PM CDT

## 2022-08-23 NOTE — TELEPHONE ENCOUNTER
----- Message from Maddie Dillard sent at 8/23/2022  1:33 PM CDT -----  Contact: [phone]  Pt called to advise that she was last seen by Dr Carrero in November and though she has refills on her RX the pharmacy cannot fill it anymore. Pt is unsure who she wants to see come November when it is time for her annual. Please Advise     Requesting an RX refill or new RX.  Is this a refill or new RX: refill  RX name and strength (copy/paste from chart):  rosuvastatin (CRESTOR) 10 MG tablet  Is this a 30 day or 90 day RX: 90 days  Pharmacy name and phone # (copy/paste from chart):    [facility]/pharmacy #[id number] - [place], [place] - [address] [address]  Phone: [phone] Fax: [phone]  The doctors have asked that we provide their patients with the following 2 reminders -- prescription refills can take up to 72 hours, and a friendly reminder that in the future you can use your [facility] account to request refills: aware

## 2022-08-23 NOTE — TELEPHONE ENCOUNTER
Spoke with pt, she needs crestor refilled. TAYLOR Dang, will refill crestor.    Pt stated she needs a PCP, resides in [facility]. She would like a PCP in [place].  Gave her [phone], to call to see if they could get her an appt in [place] or a phone number to call for [place] office

## 2022-10-17 ENCOUNTER — PATIENT MESSAGE (OUTPATIENT)
Dept: NEUROLOGY | Facility: CLINIC | Age: 62
End: 2022-10-17
Payer: COMMERCIAL

## 2022-10-19 ENCOUNTER — DOCUMENTATION ONLY (OUTPATIENT)
Dept: NEUROLOGY | Facility: CLINIC | Age: 62
End: 2022-10-19
Payer: COMMERCIAL

## 2022-11-29 ENCOUNTER — OFFICE VISIT (OUTPATIENT)
Dept: NEUROLOGY | Facility: CLINIC | Age: 62
End: 2022-11-29
Payer: COMMERCIAL

## 2022-11-29 VITALS
HEIGHT: 60 IN | WEIGHT: 175.38 LBS | SYSTOLIC BLOOD PRESSURE: 127 MMHG | HEART RATE: 81 BPM | BODY MASS INDEX: 34.43 KG/M2 | DIASTOLIC BLOOD PRESSURE: 81 MMHG

## 2022-11-29 DIAGNOSIS — Z71.89 COUNSELING REGARDING GOALS OF CARE: ICD-10-CM

## 2022-11-29 DIAGNOSIS — Z29.89 PROPHYLACTIC IMMUNOTHERAPY: ICD-10-CM

## 2022-11-29 DIAGNOSIS — G35 MS (MULTIPLE SCLEROSIS): Primary | ICD-10-CM

## 2022-11-29 DIAGNOSIS — N31.9 NEUROGENIC BLADDER: ICD-10-CM

## 2022-11-29 PROCEDURE — 1160F PR REVIEW ALL MEDS BY PRESCRIBER/CLIN PHARMACIST DOCUMENTED: ICD-10-PCS | Mod: CPTII,S$GLB,, | Performed by: PSYCHIATRY & NEUROLOGY

## 2022-11-29 PROCEDURE — 1159F PR MEDICATION LIST DOCUMENTED IN MEDICAL RECORD: ICD-10-PCS | Mod: CPTII,S$GLB,, | Performed by: PSYCHIATRY & NEUROLOGY

## 2022-11-29 PROCEDURE — 1159F MED LIST DOCD IN RCRD: CPT | Mod: CPTII,S$GLB,, | Performed by: PSYCHIATRY & NEUROLOGY

## 2022-11-29 PROCEDURE — 3079F DIAST BP 80-89 MM HG: CPT | Mod: CPTII,S$GLB,, | Performed by: PSYCHIATRY & NEUROLOGY

## 2022-11-29 PROCEDURE — 1160F RVW MEDS BY RX/DR IN RCRD: CPT | Mod: CPTII,S$GLB,, | Performed by: PSYCHIATRY & NEUROLOGY

## 2022-11-29 PROCEDURE — 3008F PR BODY MASS INDEX (BMI) DOCUMENTED: ICD-10-PCS | Mod: CPTII,S$GLB,, | Performed by: PSYCHIATRY & NEUROLOGY

## 2022-11-29 PROCEDURE — 3074F PR MOST RECENT SYSTOLIC BLOOD PRESSURE < 130 MM HG: ICD-10-PCS | Mod: CPTII,S$GLB,, | Performed by: PSYCHIATRY & NEUROLOGY

## 2022-11-29 PROCEDURE — 99999 PR PBB SHADOW E&M-EST. PATIENT-LVL V: CPT | Mod: PBBFAC,,, | Performed by: PSYCHIATRY & NEUROLOGY

## 2022-11-29 PROCEDURE — 99999 PR PBB SHADOW E&M-EST. PATIENT-LVL V: ICD-10-PCS | Mod: PBBFAC,,, | Performed by: PSYCHIATRY & NEUROLOGY

## 2022-11-29 PROCEDURE — 3079F PR MOST RECENT DIASTOLIC BLOOD PRESSURE 80-89 MM HG: ICD-10-PCS | Mod: CPTII,S$GLB,, | Performed by: PSYCHIATRY & NEUROLOGY

## 2022-11-29 PROCEDURE — 3074F SYST BP LT 130 MM HG: CPT | Mod: CPTII,S$GLB,, | Performed by: PSYCHIATRY & NEUROLOGY

## 2022-11-29 PROCEDURE — 3008F BODY MASS INDEX DOCD: CPT | Mod: CPTII,S$GLB,, | Performed by: PSYCHIATRY & NEUROLOGY

## 2022-11-29 PROCEDURE — 99214 PR OFFICE/OUTPT VISIT, EST, LEVL IV, 30-39 MIN: ICD-10-PCS | Mod: S$GLB,,, | Performed by: PSYCHIATRY & NEUROLOGY

## 2022-11-29 PROCEDURE — 99214 OFFICE O/P EST MOD 30 MIN: CPT | Mod: S$GLB,,, | Performed by: PSYCHIATRY & NEUROLOGY

## 2022-11-29 RX ORDER — SOLIFENACIN SUCCINATE 5 MG/1
5 TABLET, FILM COATED ORAL DAILY
Qty: 90 TABLET | Refills: 3 | Status: SHIPPED | OUTPATIENT
Start: 2022-11-29 | End: 2023-12-01

## 2022-11-29 RX ORDER — KETOCONAZOLE 20 MG/ML
SHAMPOO, SUSPENSION TOPICAL
COMMUNITY
Start: 2022-11-21

## 2022-11-29 NOTE — PROGRESS NOTES
Subjective:         Patient ID: Karen D Eleser is a 62 y.o. female who presents today for a routine clinic visit for MS.      MS HPI:  DMT: glatiramer acetate  Side effects from DMT? No  Taking vitamin D3 as recommended? Yes - 5,000 IU/day  In general, feeling stable  No sense of relapse since last visit; no sense of progressive decline.   Does admit to some word finding issues;  She had NP testing last in 2019.   On Vesicare and Zanaflex for MS  --both bladder and zanaflex are stable   Having LBP --following with pain management; Rhizotomy in 2 weeks     Medications:  Current Outpatient Medications   Medication Sig    ALPRAZolam (XANAX) 0.5 MG tablet TAKE 1 TABLET BY MOUTH UP TO TWICE DAILY AS NEEDED FOR ANXIETY    buPROPion (WELLBUTRIN XL) 300 MG 24 hr tablet TAKE 1 TABLET BY MOUTH EVERY DAY    cholecalciferol, vitamin D3, (VITAMIN D3) 5,000 unit Tab Take 5,000 Units by mouth once daily.     DENTA 5000 PLUS 1.1 % Crea USE AS DIRECTED DAILY    dextroamphetamine-amphetamine (ADDERALL) 20 mg tablet Take 1 tablet by mouth 2 (two) times daily.    dextroamphetamine-amphetamine (ADDERALL) 20 mg tablet Take 1 tablet by mouth 2 (two) times daily.    DULoxetine (CYMBALTA) 60 MG capsule TAKE 2 CAPSULES BY MOUTH ONCE DAILY    gabapentin (NEURONTIN) 600 MG tablet TAKE 2 TABLETS EVERY MORNING, 1 TABLET AT NOON AND 2 TABLETS AT BEDTIME    glatiramer (COPAXONE, GLATOPA) 40 mg/mL injection INJECT ONE SYRINGE SUBCUTANEOUSLY 3 TIMES A WEEK AT LEAST 48 HOURS APART. ALLOW TO WARM TO ROOM TEMP FOR 20 MINUTES. REFRIGERATE.    hydrocortisone 2.5 % cream Apply topically.    ketoconazole (NIZORAL) 2 % shampoo Apply topically.    latanoprost 0.005 % ophthalmic solution INSTILL 1 DROP INTO BOTH EYES EVERY EVENING    meloxicam (MOBIC) 15 MG tablet TAKE 1 TABLET BY MOUTH EVERY DAY    multivitamin (THERAGRAN) per tablet Take by mouth. 1 Tablet Oral Every day    PREVIDENT 5000 SENSITIVE 1.1-5 % Pste Apply topically once daily.     QUEtiapine  (SEROQUEL) 25 MG Tab Take oral 1 - 2 tablets qhs as needed for sleep    QUEtiapine (SEROQUEL) 50 MG tablet Take 1 tablet (50 mg total) by mouth every evening.    rosuvastatin (CRESTOR) 10 MG tablet Take 1 tablet (10 mg total) by mouth every evening.    solifenacin (VESICARE) 5 MG tablet TAKE 1 TABLET BY MOUTH EVERY DAY    tiZANidine (ZANAFLEX) 2 MG tablet TAKE 1/2 TABLET BY MOUTH TWICE A DAY THEN 2 TABS AT BEDTIME AS NEEDED    traMADol (ULTRAM) 50 mg tablet Take 50 mg by mouth 4 (four) times daily.    diclofenac sodium (VOLTAREN) 1 % Gel Apply 2 g topically 4 (four) times daily. (Patient not taking: Reported on 11/29/2022)    FLUZONE QUAD 4702-1804, PF, 60 mcg (15 mcg x 4)/0.5 mL Syrg TO BE ADMINISTERED BY PHARMACIST FOR IMMUNIZATION    HYDROcodone-acetaminophen (NORCO) 5-325 mg per tablet Take 1 tablet by mouth 3 (three) times daily as needed.     No current facility-administered medications for this visit.       SOCIAL HISTORY  Social History     Tobacco Use    Smoking status: Never    Smokeless tobacco: Never   Substance Use Topics    Alcohol use: Yes     Comment: rare    Drug use: No           REVIEW OF SYMPTOMS 12/8/2020   Do you feel abnormally tired on most days? Yes   Do you feel you generally sleep well? No   Do you have difficulty controlling your bladder?  Yes   Do you have difficulty controlling your bowels?  No   Do you have frequent muscle cramps, tightness or spasms in your limbs?  Yes   Do you have new visual symptoms?  No   Do you have worsening difficulty with your memory or thinking? Yes   Do you have worsening symptoms of anxiety or depression?  No   For patients who walk, Do you have more difficulty walking?  No   Have you fallen since your last visit?  Yes   For patients who use wheelchairs: Do you have any skin wounds or breakdown? Not Applicable   Do you have difficulty using your hands?  No   Do you have shooting or burning pain? Yes   Do you have difficulty with sexual function?  No   If  you are sexually active, are you using birth control? Y/N  N/A Not Applicable   Do you often choke when swallowing liquids or solid food?  No   Do you experience worsening symptoms when overheated? Yes   Do you need any new equipment such as a wheelchair, walker or shower chair? No   Do you receive co-pay financial assistance for your principal MS medicine? Yes   Would you be interested in participating in an MS research trial in the future? Yes   For patients on Gilenya, Tecfidera, Aubagio, Rituxan, Ocrevus, Tysabri, Lemtrada or Methotrexate, are you aware that you should NOT receive live virus vaccines?  Not Applicable   Do you feel you have adequate family/friend support?  No   Do you have health insurance?   Yes   Are you currently employed? No   Do you receive SSDI/SSI?  No   Do you use marijuana or cannabis products? Yes   How often? Daily   Have you been diagnosed with a urinary tract infection since your last visit here? No   Have you been diagnosed with a respiratory tract infection since your last visit here? No   Have you been to the emergency room since your last visit here? Yes   Have you been hospitalized since your last visit here?  Yes                Objective:      Timed 25 Foot Walk: 10/1/2019 11/29/2022   Did patient wear an AFO? No No   Was assistive device used? No No   Time for 25 Foot Walk (seconds) 5.2 5.8   Time for 25 Foot Walk (seconds) 5.8 -     Neurologic Exam  MENTAL STATUS: grossly intact  CRANIAL NERVE EXAM: There is no internuclear ophthalmoplegia. Extraocular   muscles are intact.  No facial   asymmetry.There is no dysarthria.   MOTOR EXAM: Normal bulk and tone throughout UE and LE bilaterally. Rapid sequential movements are normal. Full strength in bursts all 4 limbs  REFLEXES: Symmetric and 1+ t/o  SENSORY EXAM: slight decrease in vib distal LE  COORDINATION: Normal finger-to-nose exam.   GAIT: slightly wide based / slow;       Imaging:     Results for orders placed during the  hospital encounter of 12/23/20    MRI Brain Demyelinating Without Contrast    Impression  1. Redemonstration of multiple supratentorial white matter lesions compatible with the patient's history of multiple sclerosis.  No definite new lesion or diffusion restriction to indicate active demyelination.  2. No acute intracranial abnormality or significant interval detrimental change since the prior study on 05/15/2019.      Electronically signed by: Gerardo Dominguez  Date:    12/23/2020  Time:    13:08          Labs:     Lab Results   Component Value Date    [id number] 72 11/08/2021    [id number] 32 12/08/2020    [id number] 85 03/10/2020     No results found for: JCVINDEX, JCVANTIBODY  No results found for: OY5DIWMS, ABSOLUTECD3, TB0KZLUS, ABSOLUTECD8, SS5KLFNQ, ABSOLUTECD4, LABCD48  Lab Results   Component Value Date    WBC 7.69 11/08/2021    RBC 4.36 11/08/2021    HGB 13.6 11/08/2021    HCT 41.9 11/08/2021    MCV 96 11/08/2021    MCH 31.2 (H) 11/08/2021    MCHC 32.5 11/08/2021    RDW 14.0 11/08/2021     11/08/2021    MPV 11.7 11/08/2021    GRAN 5.3 11/08/2021    GRAN 68.9 11/08/2021    LYMPH 1.5 11/08/2021    LYMPH 20.0 11/08/2021    MONO 0.6 11/08/2021    MONO 7.5 11/08/2021    EOS 0.2 11/08/2021    BASO 0.05 11/08/2021    EOSINOPHIL 2.6 11/08/2021    BASOPHIL 0.7 11/08/2021     Sodium   Date Value Ref Range Status   11/08/2021 142 136 - 145 mmol/L Final     Potassium   Date Value Ref Range Status   11/08/2021 3.7 3.5 - 5.1 mmol/L Final     Chloride   Date Value Ref Range Status   11/08/2021 103 95 - 110 mmol/L Final     CO2   Date Value Ref Range Status   11/08/2021 30 (H) 23 - 29 mmol/L Final     Glucose   Date Value Ref Range Status   11/08/2021 100 70 - 110 mg/dL Final     BUN   Date Value Ref Range Status   11/08/2021 18 6 - 20 mg/dL Final     Creatinine   Date Value Ref Range Status   11/08/2021 0.8 0.5 - 1.4 mg/dL Final     Calcium   Date Value Ref Range Status   11/08/2021 9.9 8.7 - 10.5 mg/dL Final      Total Protein   Date Value Ref Range Status   11/08/2021 7.1 6.0 - 8.4 g/dL Final     Albumin   Date Value Ref Range Status   11/08/2021 4.0 3.5 - 5.2 g/dL Final     Total Bilirubin   Date Value Ref Range Status   11/08/2021 0.4 0.1 - 1.0 mg/dL Final     Comment:     For infants and newborns, interpretation of results should be based  on gestational age, weight and in agreement with clinical  observations.    Premature Infant recommended reference ranges:  Up to 24 hours.............<8.0 mg/dL  Up to 48 hours............<12.0 mg/dL  3-5 days..................<15.0 mg/dL  6-29 days.................<15.0 mg/dL       Alkaline Phosphatase   Date Value Ref Range Status   11/08/2021 85 55 - 135 U/L Final     AST   Date Value Ref Range Status   11/08/2021 20 10 - 40 U/L Final     ALT   Date Value Ref Range Status   11/08/2021 21 10 - 44 U/L Final     Anion Gap   Date Value Ref Range Status   11/08/2021 9 8 - 16 mmol/L Final     eGFR if    Date Value Ref Range Status   11/08/2021 >60.0 >60 mL/min/1.73 m^2 Final     eGFR if non    Date Value Ref Range Status   11/08/2021 >60.0 >60 mL/min/1.73 m^2 Final     Comment:     Calculation used to obtain the estimated glomerular filtration  rate (eGFR) is the CKD-EPI equation.        No results found for: HEPBSAG, HEPBSAB, HEPBCAB        MS Impression and Plan:     NEURO MULTIPLE SCLEROSIS IMPRESSION:   MS Status:     Number of relapses in the past year?:  0    Clinical Progression:  Clinically Stable    MRI Progression:  Stable  Plan:     DMT:  No change in management    Symptom Management:  Implement change in symptom management     Recommended starting a regular exercise program;  Discussed taking a daily walk, yoga, or joining the gym  POPEYE  MRI brain ordered  F/u 1 year           Problem List Items Addressed This Visit          Unprioritized    Prophylactic immunotherapy    Counseling regarding goals of care    Neurogenic bladder    Relevant  Medications    solifenacin (VESICARE) 5 MG tablet     Other Visit Diagnoses       MS (multiple sclerosis)    -  Primary    Relevant Medications    solifenacin (VESICARE) 5 MG tablet    Other Relevant Orders    MRI Brain Demyelinating Without Contrast            Maia Minaya MD    I spent a total of 30 minutes on the day of the visit.This includes face to face time and non-face to face time preparing to see the patient (eg, review of tests), obtaining and/or reviewing separately obtained history, documenting clinical information in the electronic or other health record, independently interpreting results and communicating results to the patient/family/caregiver, or care coordinator.

## 2022-12-01 ENCOUNTER — PATIENT MESSAGE (OUTPATIENT)
Dept: PSYCHIATRY | Facility: CLINIC | Age: 62
End: 2022-12-01
Payer: COMMERCIAL

## 2022-12-07 ENCOUNTER — PATIENT MESSAGE (OUTPATIENT)
Dept: PSYCHIATRY | Facility: CLINIC | Age: 62
End: 2022-12-07
Payer: COMMERCIAL

## 2023-01-26 ENCOUNTER — OFFICE VISIT (OUTPATIENT)
Dept: PSYCHIATRY | Facility: CLINIC | Age: 63
End: 2023-01-26
Payer: COMMERCIAL

## 2023-01-26 DIAGNOSIS — F51.04 CHRONIC INSOMNIA: ICD-10-CM

## 2023-01-26 DIAGNOSIS — R41.840 CONCENTRATION DEFICIT: ICD-10-CM

## 2023-01-26 DIAGNOSIS — F33.1 MAJOR DEPRESSIVE DISORDER, RECURRENT EPISODE, MODERATE: ICD-10-CM

## 2023-01-26 DIAGNOSIS — R53.82 CHRONIC FATIGUE: ICD-10-CM

## 2023-01-26 DIAGNOSIS — F33.0 MDD (MAJOR DEPRESSIVE DISORDER), RECURRENT EPISODE, MILD: Primary | ICD-10-CM

## 2023-01-26 DIAGNOSIS — F34.1 DYSTHYMIC DISORDER: ICD-10-CM

## 2023-01-26 DIAGNOSIS — F41.1 GAD (GENERALIZED ANXIETY DISORDER): ICD-10-CM

## 2023-01-26 DIAGNOSIS — G35 MULTIPLE SCLEROSIS: ICD-10-CM

## 2023-01-26 PROCEDURE — 99213 PR OFFICE/OUTPT VISIT, EST, LEVL III, 20-29 MIN: ICD-10-PCS | Mod: 95,,, | Performed by: PSYCHIATRY & NEUROLOGY

## 2023-01-26 PROCEDURE — 99213 OFFICE O/P EST LOW 20 MIN: CPT | Mod: 95,,, | Performed by: PSYCHIATRY & NEUROLOGY

## 2023-01-26 RX ORDER — QUETIAPINE FUMARATE 25 MG/1
TABLET, FILM COATED ORAL
Qty: 60 TABLET | Refills: 6 | Status: SHIPPED | OUTPATIENT
Start: 2023-01-26 | End: 2023-10-12

## 2023-01-26 RX ORDER — QUETIAPINE FUMARATE 50 MG/1
50 TABLET, FILM COATED ORAL NIGHTLY
Qty: 90 TABLET | Refills: 1 | Status: SHIPPED | OUTPATIENT
Start: 2023-01-26 | End: 2023-08-18 | Stop reason: SDUPTHER

## 2023-01-26 RX ORDER — DEXTROAMPHETAMINE SACCHARATE, AMPHETAMINE ASPARTATE, DEXTROAMPHETAMINE SULFATE AND AMPHETAMINE SULFATE 5; 5; 5; 5 MG/1; MG/1; MG/1; MG/1
1 TABLET ORAL 2 TIMES DAILY
Qty: 60 TABLET | Refills: 0 | Status: SHIPPED | OUTPATIENT
Start: 2023-01-26 | End: 2023-11-03 | Stop reason: SDUPTHER

## 2023-01-26 RX ORDER — DEXTROAMPHETAMINE SACCHARATE, AMPHETAMINE ASPARTATE, DEXTROAMPHETAMINE SULFATE AND AMPHETAMINE SULFATE 5; 5; 5; 5 MG/1; MG/1; MG/1; MG/1
1 TABLET ORAL 2 TIMES DAILY
Qty: 60 TABLET | Refills: 0 | Status: SHIPPED | OUTPATIENT
Start: 2023-02-25 | End: 2023-08-24 | Stop reason: SDUPTHER

## 2023-01-26 NOTE — PROGRESS NOTES
"Outpatient Psychiatry Follow-Up Visit (MD/NP) -- telemedicine visit  01/26/2023    The patient location is: [place] ([place], [place]).  The chief complaint leading to consultation is: see below    Visit type: audiovisual    Face to Face time with patient: 30 minutes  40 minutes of total time spent on the encounter, which includes face to face time and non-face to face time preparing to see the patient (eg, review of tests), Obtaining and/or reviewing separately obtained history, Documenting clinical information in the electronic or other health record, Independently interpreting results (not separately reported) and communicating results to the patient/family/caregiver, or Care coordination (not separately reported).     Each patient to whom he or she provides medical services by telemedicine is:  (1) informed of the relationship between the physician and patient and the respective role of any other health care provider with respect to management of the patient; and (2) notified that he or she may decline to receive medical services by telemedicine and may withdraw from such care at any time.      Session Length:  30 minutes (E&M level 3)      Clinical Status of Patient: Outpatient (Ambulatory)    Chief Complaint: Karen D Eleser is a 62 y.o. female who presents today for follow-up of chronic depression and anxiety.    Met with patient.     Interval History and Content of Current Session:  Interim Events/Subjective Report/Content of Current Session:  First f/u appt with me since 5/30/2022.  Pt had seen Dr. Tash Clark in the past; last appointment was on 9/28/2015 (this note was reviewed).      Plan from last session:  "Continue all current meds as noted above.   Two Rx for Adderall, each for a 30 day supply with no refills & with "Do not fill until" dates posted accordingly, were mailed to patient's home address as listed in EMR."    She states she has been doing "fine" overall.      She is hoping to go back to " "[place] in 2023.    She is hoping she will have a much better experience compared to last year when she aggravated her back.      She continues to have chronic painful neuropathy in her feet, legs due to lumbar disc herniation.    She takes gabapentin for the pain.      She mostly isolates self at home, leaves house infrequently to run errands.         Current SIGECAPS:    Sleep -- falls asleep OK; has had more nights recently when she awakens and has trouble falling back asleep.  Occasionally takes Xanax or tizanidine.    Interests -- low in general.  Still has low structure -- tends to stay at home, watch TV or read.      Guilt -- for some past events with close family members  Energy -- fluctuates; some days OK, some < normal, easily fatigued.     Concentration -- poor due to MS; has a hard time reading      Appetite -- "fine"   Psychomotor --  Somewhat decreased   Suicidal ideation -- denies           Will take Adderall -- it gives her energy, but also makes her feel jittery, especially when she takes an entire tablet without food.    She has NOT been taking the Adderall everyday, just when needed for extra energy or concentration.       She continues to see her pain management specialist at [facility] on [place].    She has used medical marijuana -- she has taken it orally under her tongue (see below).        I have encouraged pt to keep in touch with others (friends, relatives).      Past psych meds: celexa, lexapro, and effexor in the past.    From previous sessions:  Her father had endocarditis when he was younger.  He then had to have a pacemaker, then dual pacemaker/defibrillator.  He also had 3 sisters (her aunts) who all had heart disease (I did not asked if they smoked).  He had 3 older brothers who have been healthy.  She denies heart disease on her mother's side.    Her sister  from a massive MI at age 57 yo.  However, she abused pills and OTC meds and had gone to rehab " "at [place] just months prior to her death.  She notes she and her sister were very close; they even lived together for a while.    Pt has denied ever having problems with her heart.  She stated she never had a stress test, but she has had "plenty of" EKG's.   --Pt had neuropsychological testing on 19 with Dr. Butt for interpretation.  It did not show anything significant besides some slowing of processing speed thought likely due to the MS.   She had been treated by Dr. Royce Pina for MS until his death in .  She has also been treated by other neurologists.  Dr. Maia Minaya is her neurologist currently.   --Regarding her mom and sister:  Mother passed: 2015. Sister passed: 2014. Sister had problems with drugs and alcohol, was a nurse and lost her nursing license.  She  from a massive MI while in the [facility] house after substance rehab.  She was close to them both.  She had enjoyed going to the movies, going out to eat, travel, etc.  However, she had always done these things with her sister, who  in 2014.  She has no one in her life that she is close to compared to her sister in the past.  She has a brother who is 6 years younger -- "he can be very abrasive, he talks real loud".  "He also has his life, his children".  She also notes he does not understand or appreciate her mental illness.   --Regarding past intimate relationships: She has had just one very serious relationship that lasted for 10 years.  She states the relationship ended because he lied to her & told her that he worked and stayed at his mother's house, caring for her.  However, he did not work, lived off his mother's SS, then was kicked out of the mom's trailer by his siblings after she , so he was homeless for a while.  She thinks he may be living with his ex-wife now.  She states this happened about 5 years ago.    Since then, she had met 2 guys on line ([employer]) -- went out with each of them a few " times, but nothing ever came of this.    She notes her mother was very controlling.  Pt also was shy growing up.  Pt denies ever being sexually molested.  She denies ever having problems with alcohol or drugs.  She is out on disability from work ([employer]) as a .    She is still getting a check through her work disability co.     PSYCHOTHERAPY ADD-ON [id number]   30 (16-37*) minutes   Target symptoms: depression, anxiety, fatigue  Why chosen therapy is appropriate versus another modality: relevant to diagnosis, patient responds to this modality  Outcome monitoring methods: self-report  Therapeutic intervention type: supportive psychotherapy  Topics discussed/themes: stress related to medical comorbidities, life stage transitional issues, grief, social isolation, self care/nurturing.  The patient's response to the intervention is accepting.   The patient's progress toward treatment goals is fair.  Duration of intervention: 16 minutes      Review of Systems   PSYCHIATRIC: Pertinant items are noted in the narrative.  CONSTITUTIONAL: Some recent wt fluctuations.  Chronic fatigue.     MUSCULOSKELETAL:  Chronic generalized aches/pains.   NEUROLOGIC: Some occasional mild numbness and tingling in her arms.  + chronic nuropathic pain in her feet/legs.  No weakness, seizures, confusion, memory loss, tremor or other abnormal movements.  ENDOCRINE: No polydipsia or polyuria.  INTEGUMENTARY: No rashes or lacerations.  EYES: No exophthalmos, jaundice or blindness.  ENT: No dizziness, tinnitus or hearing loss.  RESPIRATORY: No shortness of breath.  CARDIOVASCULAR: No tachycardia or chest pain.  GASTROINTESTINAL: No nausea, vomiting, pain, constipation or diarrhea.  GENITOURINARY: No frequency, dysuria.      Past Medical, Family and Social History: The patient's past medical, family and social history have been reviewed and updated as appropriate within the electronic medical record -- see encounter notes.        Current Outpatient Medications:     ALPRAZolam (XANAX) 0.5 MG tablet, TAKE 1 TABLET BY MOUTH UP TO TWICE DAILY AS NEEDED FOR ANXIETY, Disp: 60 tablet, Rfl: 0    buPROPion (WELLBUTRIN XL) 300 MG 24 hr tablet, TAKE 1 TABLET BY MOUTH EVERY DAY, Disp: 90 tablet, Rfl: 3    cholecalciferol, vitamin D3, (VITAMIN D3) 5,000 unit Tab, Take 5,000 Units by mouth once daily. , Disp: , Rfl:     DENTA 5000 PLUS 1.1 % Crea, USE AS DIRECTED DAILY, Disp: , Rfl:     dextroamphetamine-amphetamine (ADDERALL) 20 mg tablet, Take 1 tablet by mouth 2 (two) times daily., Disp: 60 tablet, Rfl: 0    dextroamphetamine-amphetamine (ADDERALL) 20 mg tablet, Take 1 tablet by mouth 2 (two) times daily., Disp: 60 tablet, Rfl: 0    diclofenac sodium (VOLTAREN) 1 % Gel, Apply 2 g topically 4 (four) times daily. (Patient not taking: Reported on 11/29/2022), Disp: 5 Tube, Rfl: 0    DULoxetine (CYMBALTA) 60 MG capsule, TAKE 2 CAPSULES BY MOUTH ONCE DAILY, Disp: 30 capsule, Rfl: 11    FLUZONE QUAD 9624-3901, PF, 60 mcg (15 mcg x 4)/0.5 mL Syrg, TO BE ADMINISTERED BY PHARMACIST FOR IMMUNIZATION, Disp: , Rfl: 0    gabapentin (NEURONTIN) 600 MG tablet, TAKE 2 TABLETS EVERY MORNING, 1 TABLET AT NOON AND 2 TABLETS AT BEDTIME, Disp: 450 tablet, Rfl: 1    glatiramer (COPAXONE, GLATOPA) 40 mg/mL injection, INJECT ONE SYRINGE SUBCUTANEOUSLY 3 TIMES A WEEK AT LEAST 48 HOURS APART. ALLOW TO WARM TO ROOM TEMP FOR 20 MINUTES. REFRIGERATE., Disp: 12 each, Rfl: 1    hydrocortisone 2.5 % cream, Apply topically., Disp: , Rfl:     ketoconazole (NIZORAL) 2 % shampoo, Apply topically., Disp: , Rfl:     latanoprost 0.005 % ophthalmic solution, INSTILL 1 DROP INTO BOTH EYES IN THE EVENING, Disp: 7.5 mL, Rfl: 4    meloxicam (MOBIC) 15 MG tablet, TAKE 1 TABLET BY MOUTH EVERY DAY, Disp: 90 tablet, Rfl: 0    multivitamin (THERAGRAN) per tablet, Take by mouth. 1 Tablet Oral Every day, Disp: , Rfl:     PREVIDENT 5000 SENSITIVE 1.1-5 % Pste, Apply topically once daily. , Disp: ,  "Rfl:     QUEtiapine (SEROQUEL) 25 MG Tab, Take oral 1 - 2 tablets qhs as needed for sleep, Disp: 60 tablet, Rfl: 6    QUEtiapine (SEROQUEL) 50 MG tablet, Take 1 tablet (50 mg total) by mouth every evening., Disp: 90 tablet, Rfl: 1    rosuvastatin (CRESTOR) 10 MG tablet, Take 1 tablet (10 mg total) by mouth every evening., Disp: 90 tablet, Rfl: 3    solifenacin (VESICARE) 5 MG tablet, Take 1 tablet (5 mg total) by mouth once daily., Disp: 90 tablet, Rfl: 3    tiZANidine (ZANAFLEX) 2 MG tablet, TAKE 1/2 TABLET BY MOUTH TWICE A DAY THEN 2 TABS AT BEDTIME AS NEEDED, Disp: 270 tablet, Rfl: 3    traMADol (ULTRAM) 50 mg tablet, Take 50 mg by mouth 4 (four) times daily., Disp: , Rfl: 1    She occasionally takes Xanax -- takes occasional one tab in day for anxiety; will take one at night to help her relax and fall asleep.  She also takes tizanidine 2 tabs nightly for sleep and chronic pain.         Gabapentin has been Rx by Dr. Rios; she is taking as above as prescribed -- it is for peripheral neuropathy in her legs/feet.   She also takes Tramadol one tablet up to QID for low back and knee pain.    She has not taken the Adderall 20 mg in some time.        She had stated she took medical marijuana that is "prescribed" by her pain management doctor and dispensed by a pharmacy in [place].  It is a tincture in a bottle with a dropper.  It is labelled "" 1 ml dropper that she can take up to TID (takes for pain).  She states it helps with alleviate the pain; she estimates it provides about 50% pain relief.    She takes the THC in conjunction with oral CBD oil -- "(the CBD oil) gives that extra boost".    With this combination, she has needed to use less Tramadol or Norco.    Compliance: yes    Side effects: Ambien -- visual hallucinations and illusions, sleepwalking/sleepeating.     Risk Parameters:  Patient reports no suicidal ideation  Patient reports no homicidal ideation  Patient reports no self-injurious " "behavior  Patient reports no violent behavior    Exam (detailed: at least 9 elements; comprehensive: all 15 elements)    Constitutional  Vitals:  Most recent vital signs, dated less than 90 days prior to this appointment, were reviewed:   NO VITALS DONE TODAY -- VIRTUAL SESSION ONLY    Vitals - 1 value per visit 12/8/2020 6/8/2021 9/27/2021 11/8/2021   SYSTOLIC 135   120   DIASTOLIC 84   80   PULSE 69   109   TEMPERATURE       RESPIRATIONS    16   SPO2    98   Weight (lb) 159.5 150 149.91 158.51   Weight (kg) 72.35 68.04 68 71.9   HEIGHT 5' 0"  5' 0" 5' 0"   BODY MASS INDEX 31.15 29.29 29.28 30.96   VISIT REPORT       Pain Score  5   0       Vitals - 1 value per visit 10/1/2019 10/16/2019 10/30/2019 2/11/2020   SYSTOLIC 123 131 99 143   DIASTOLIC 85 79 65 82   PULSE 78 91 70 84   TEMPERATURE   98.2    SPO2       Weight (lb) 153.22 153 151 154.1   Weight (kg) 69.5 69.4 68.493 69.9   HEIGHT 5' 0" 5' 0" 5' 0" 5' 0"   BODY MASS INDEX 29.92 29.88 29.49 30.1   VISIT REPORT       Pain Score  7  6         General:  unremarkable, age appropriate, well dressed, neatly groomed     Musculoskeletal  Muscle Strength/Tone:  N/A (virtual session only)   Gait & Station:  N/A (virtual session only)     Psychiatric  Speech:  normal tone, normal rate, normal pitch, normal volume, spontaneous    Mood & Affect:  "OK"  congruent and appropriate to situation/content.    Thought Process:  normal and logical, mildly circumstantial   Associations:  intact    Thought Content:  normal, no suicidality, no homicidality, delusions, or paranoia    Insight & Judgement:  good, intact  adequate to circumstances, age appropriate, good    Orientation:  grossly intact, person, place, time/date, situation    Memory:  intact for content of interview, grossly intact    Language:  grossly intact    Attention Span & Concentration:  able to focus    Fund of Knowledge:  intact and appropriate to age and level of education, familiar with aspects of current " "personal life      Prior EKG:  Test Reason : Z79.899  Vent. Rate : 077 BPM     Atrial Rate : 077 BPM     P-R Int : 136 ms          QRS Dur : 090 ms      QT Int : 406 ms       P-R-T Axes : 033 000 073 degrees     QTc Int : 459 ms  Normal sinus rhythm  Poor R wave progression  Abnormal ECG  When compared with ECG of 21-JUL-2008 08:45,  Nonspecific T wave abnormality no longer evident in Inferior leads  QT has lengthened  Confirmed by Marty VEE, Yaneli ([id number]) on 8/11/2017 4:36:19 PM      Assessment and Diagnosis    Status/Progress: Based on the examination today, the patient's problem(s) is/are adequately, but not ideally controlled. New problems have not been presented today. Comorbidities (chronic neuropathic pain) are complicating management of the primary condition. There are no active rule-out diagnoses for this patient at this time.    General Impression:   Major Depressive Disorder, Recurrent: Mild  Dysthymic Disorder   Generalized Anxiety Disorder  Chronic Insomnia   Multiple Sclerosis (NOT CODED)   Also: chronic pain, CHELSEA    Intervention/Counseling/Treatment Plan    Current medication management:  Continue all current meds as noted above.   Two Rx for Adderall, each for a 30 day supply with no refills & with "Do not fill until" dates posted accordingly, were mailed to patient's home address as listed in EMR.      Additional Notes:  Encouraged individual psychotherapy for support. Patient had been meeting with LCSW in Neurology clinic; she can continue this (if insurance allows) or consider f/u with Dr. Nery Toledo or other therapist in our clinic.     Return to Clinic: 6 months, or sooner prn.      "

## 2023-02-20 ENCOUNTER — PATIENT MESSAGE (OUTPATIENT)
Dept: PSYCHIATRY | Facility: CLINIC | Age: 63
End: 2023-02-20
Payer: COMMERCIAL

## 2023-03-16 ENCOUNTER — OFFICE VISIT (OUTPATIENT)
Dept: FAMILY MEDICINE | Facility: CLINIC | Age: 63
End: 2023-03-16
Payer: COMMERCIAL

## 2023-03-16 VITALS
BODY MASS INDEX: 32.72 KG/M2 | SYSTOLIC BLOOD PRESSURE: 124 MMHG | DIASTOLIC BLOOD PRESSURE: 82 MMHG | WEIGHT: 167.56 LBS | HEART RATE: 98 BPM | OXYGEN SATURATION: 96 %

## 2023-03-16 DIAGNOSIS — Z11.59 NEED FOR HEPATITIS C SCREENING TEST: ICD-10-CM

## 2023-03-16 DIAGNOSIS — Z12.31 ENCOUNTER FOR SCREENING MAMMOGRAM FOR MALIGNANT NEOPLASM OF BREAST: ICD-10-CM

## 2023-03-16 DIAGNOSIS — Z11.4 SCREENING FOR HIV (HUMAN IMMUNODEFICIENCY VIRUS): ICD-10-CM

## 2023-03-16 DIAGNOSIS — E78.2 MIXED HYPERLIPIDEMIA: ICD-10-CM

## 2023-03-16 DIAGNOSIS — Z01.419 WELL WOMAN EXAM: Primary | ICD-10-CM

## 2023-03-16 PROCEDURE — 1159F PR MEDICATION LIST DOCUMENTED IN MEDICAL RECORD: ICD-10-PCS | Mod: CPTII,S$GLB,, | Performed by: NURSE PRACTITIONER

## 2023-03-16 PROCEDURE — 99999 PR PBB SHADOW E&M-EST. PATIENT-LVL V: CPT | Mod: PBBFAC,,, | Performed by: NURSE PRACTITIONER

## 2023-03-16 PROCEDURE — 99396 PREV VISIT EST AGE 40-64: CPT | Mod: S$GLB,,, | Performed by: NURSE PRACTITIONER

## 2023-03-16 PROCEDURE — 3074F SYST BP LT 130 MM HG: CPT | Mod: CPTII,S$GLB,, | Performed by: NURSE PRACTITIONER

## 2023-03-16 PROCEDURE — 3008F BODY MASS INDEX DOCD: CPT | Mod: CPTII,S$GLB,, | Performed by: NURSE PRACTITIONER

## 2023-03-16 PROCEDURE — 3079F PR MOST RECENT DIASTOLIC BLOOD PRESSURE 80-89 MM HG: ICD-10-PCS | Mod: CPTII,S$GLB,, | Performed by: NURSE PRACTITIONER

## 2023-03-16 PROCEDURE — 99396 PR PREVENTIVE VISIT,EST,40-64: ICD-10-PCS | Mod: S$GLB,,, | Performed by: NURSE PRACTITIONER

## 2023-03-16 PROCEDURE — 1159F MED LIST DOCD IN RCRD: CPT | Mod: CPTII,S$GLB,, | Performed by: NURSE PRACTITIONER

## 2023-03-16 PROCEDURE — 3008F PR BODY MASS INDEX (BMI) DOCUMENTED: ICD-10-PCS | Mod: CPTII,S$GLB,, | Performed by: NURSE PRACTITIONER

## 2023-03-16 PROCEDURE — 3074F PR MOST RECENT SYSTOLIC BLOOD PRESSURE < 130 MM HG: ICD-10-PCS | Mod: CPTII,S$GLB,, | Performed by: NURSE PRACTITIONER

## 2023-03-16 PROCEDURE — 3079F DIAST BP 80-89 MM HG: CPT | Mod: CPTII,S$GLB,, | Performed by: NURSE PRACTITIONER

## 2023-03-16 PROCEDURE — 99999 PR PBB SHADOW E&M-EST. PATIENT-LVL V: ICD-10-PCS | Mod: PBBFAC,,, | Performed by: NURSE PRACTITIONER

## 2023-03-16 RX ORDER — ROSUVASTATIN CALCIUM 10 MG/1
10 TABLET, COATED ORAL NIGHTLY
Qty: 90 TABLET | Refills: 3 | Status: SHIPPED | OUTPATIENT
Start: 2023-03-16 | End: 2024-02-06 | Stop reason: SDUPTHER

## 2023-03-16 NOTE — PROGRESS NOTES
Karen D Eleser is a 62 y.o. female patient of Dr. Chavez primary care provider on file. who presents to the clinic today for No chief complaint on file.  .    HPI        Karen D Eleser is a 62 y.o. female patient of Dr. Chavez primary care provider on file. who presents to the clinic today for a for an in-clinic visit..    HPI    Pt, who is new to me, reports a new problem to me:     Well Adult Physical   Patient here for a comprehensive physical exam.The patient reports no problems.  Has MS.  Under neuro care.    Do you take any herbs or supplements that were not prescribed by a doctor? no   Are you taking calcium supplements? no   Are you taking aspirin daily? not applicable    Patient last seen by PCP-. years  ago.     PMH: below      Past Medical History:   Diagnosis Date    Arthritis     Chronic insomnia 4/14/2016    DDD (degenerative disc disease), lumbar 7/23/2013    Depression     Dysthymic disorder 3/30/2015    Fatigue     Fibromyalgia     LIZZETH (generalized anxiety disorder) 4/14/2016    Hyperlipidemia     Major depressive disorder, recurrent, moderate 4/14/2016    Multiple sclerosis     Torn meniscus    Vit D insufficiency 2021, last check level was 72.    Interval Hx  feels well  Reports the following updates:has no other new concerns        Current Outpatient Medications:     ALPRAZolam (XANAX) 0.5 MG tablet, TAKE 1 TABLET BY MOUTH UP TO TWICE DAILY AS NEEDED FOR ANXIETY, Disp: 60 tablet, Rfl: 5    buPROPion (WELLBUTRIN XL) 300 MG 24 hr tablet, TAKE 1 TABLET BY MOUTH EVERY DAY, Disp: 90 tablet, Rfl: 3    cholecalciferol, vitamin D3, (VITAMIN D3) 5,000 unit Tab, Take 5,000 Units by mouth once daily. , Disp: , Rfl:     DENTA 5000 PLUS 1.1 % Crea, USE AS DIRECTED DAILY, Disp: , Rfl:     dextroamphetamine-amphetamine (ADDERALL) 20 mg tablet, Take 1 tablet by mouth 2 (two) times daily., Disp: 60 tablet, Rfl: 0    dextroamphetamine-amphetamine (ADDERALL) 20 mg tablet, Take 1 tablet by mouth 2 (two) times daily., Disp:  60 tablet, Rfl: 0    diclofenac sodium (VOLTAREN) 1 % Gel, Apply 2 g topically 4 (four) times daily. (Patient not taking: Reported on 11/29/2022), Disp: 5 Tube, Rfl: 0    DULoxetine (CYMBALTA) 60 MG capsule, TAKE 2 CAPSULES BY MOUTH ONCE DAILY, Disp: 30 capsule, Rfl: 11    FLUZONE QUAD [id number], PF, 60 mcg (15 mcg x 4)/0.5 mL Syrg, TO BE ADMINISTERED BY PHARMACIST FOR IMMUNIZATION, Disp: , Rfl: 0    gabapentin (NEURONTIN) 600 MG tablet, TAKE 2 TABLETS EVERY MORNING, 1 TABLET AT NOON AND 2 TABLETS AT BEDTIME, Disp: 450 tablet, Rfl: 1    glatiramer (COPAXONE, GLATOPA) 40 mg/mL injection, INJECT ONE SYRINGE SUBCUTANEOUSLY 3 TIMES A WEEK AT LEAST 48 HOURS APART. ALLOW TO WARM TO ROOM TEMP FOR 20 MINUTES. REFRIGERATE., Disp: 12 each, Rfl: 1    hydrocortisone 2.5 % cream, Apply topically., Disp: , Rfl:     ketoconazole (NIZORAL) 2 % shampoo, Apply topically., Disp: , Rfl:     latanoprost 0.005 % ophthalmic solution, INSTILL 1 DROP INTO BOTH EYES IN THE EVENING, Disp: 7.5 mL, Rfl: 4    meloxicam (MOBIC) 15 MG tablet, TAKE 1 TABLET BY MOUTH EVERY DAY, Disp: 90 tablet, Rfl: 0    multivitamin (THERAGRAN) per tablet, Take by mouth. 1 Tablet Oral Every day, Disp: , Rfl:     PREVIDENT 5000 SENSITIVE 1.1-5 % Pste, Apply topically once daily. , Disp: , Rfl:     QUEtiapine (SEROQUEL) 25 MG Tab, Take oral 1 - 2 tablets qhs as needed for sleep, Disp: 60 tablet, Rfl: 6    QUEtiapine (SEROQUEL) 50 MG tablet, Take 1 tablet (50 mg total) by mouth every evening., Disp: 90 tablet, Rfl: 1    rosuvastatin (CRESTOR) 10 MG tablet, Take 1 tablet (10 mg total) by mouth every evening., Disp: 90 tablet, Rfl: 3    solifenacin (VESICARE) 5 MG tablet, Take 1 tablet (5 mg total) by mouth once daily., Disp: 90 tablet, Rfl: 3    tiZANidine (ZANAFLEX) 2 MG tablet, TAKE 1/2 TABLET BY MOUTH TWICE A DAY THEN 2 TABS AT BEDTIME AS NEEDED, Disp: 270 tablet, Rfl: 3    traMADol (ULTRAM) 50 mg tablet, Take 50 mg by mouth 4 (four) times daily., Disp: , Rfl:  1    Medications:  Is taking her regular medication(s) as prescribed without side effects, expresses no concerns.    OTC Medications in use besides those on the medication list are D    Patient has never been a smoker.    Health promotion/maintenance: See health maintenance page in the chart.    ROS      Constitutional:   negative for excess weight loss or weight gain     Head:      no headaches   EENT:  no concerns    Heart/Lung    Respiratory: no cough or shortness of breath  Cardiovascular: no chest pain or palpitations.      GI:   No abdominal pain, No change in bowel habits, No nausea, vomiting, diarrhea, or constipation    Urinary:  urgency and other times difficulty starting urine stream.    GYN:  no breast pain or new or enlarging lumps on self exam, no hot flashes  [place]  MS:  positive for many bone/joint/muscle problems.  H/o surg on left knee-2 yrs ago.  Multiple areas of arthritis.   Symptoms r/t any reported back pain:    recent rhizotoomy.    Neuro: weakness and numbness (left arm)    Skin:  negative     PMH:  Many MSK disorders, including fibromyalgia,     PHYSICAL EXAM    Alert, coop 62 y.o. female patient in no distress, is not ill appearing.    Vitals:    03/16/23 1602   BP: 124/82   BP Location: Left arm   Patient Position: Sitting   Pulse: 98   SpO2: 96%   Weight: 76 kg (167 lb 8.8 oz)         VS reviewed.  VS stable.   CC, nursing note, medications & PMH all reviewed today.    Head:  Normocephalic, without obvious abnormality, atraumatic    EENT:             ENT:  Ext ears { without lesions or redness, EACs patent  bilateral           TM:  light reflex  with + cone of light  bilateral           Nose {normal nontender sinuses           Lymph nodes: without submental, parotid, anterior cervical, posterior cervical, and supraclavicular lymph nodes palp.           Thyroid:  non-palpable    Resp:  Breathing unlabored.  Lungs CTA bilat.  Moves air to bases bilat.  Resp excursion symmetrical.    Heart:   Heart regular rate. and Regular rate and rhythm.      ABD:  soft, nontender, nondistended.  Positive bowel sounds.    Breast:  deferred to GYN provider appt.    GYN: No exam performed today, deferred to GYN provider.    MS:  Ambulates 3. Normal: Walks 20', No Assistive device, no evidence for imbalance. Normal gait pattern., with no ambulation aid     NEURO:  alert, oriented x3  speech normal in context and clarity  gait: normal    Skin:  Skin color, texture, turgor normal. No rashes or lesions    Psych:  Responds appropriately throughout the visit.               Mood:  pleasant and appropriate               Appearance: well-groomed, appropriate .               Affect:  congruent and appropriate    Well woman exam  -     Ambulatory referral/consult to Gynecology; Future; Expected date: 03/23/2023  -     Comprehensive Metabolic Panel; Future; Expected date: 03/16/2023  -     CBC Auto Differential; Future; Expected date: 03/16/2023  -     Lipid Panel; Future; Expected date: 03/16/2023  -     Vitamin D; Future; Expected date: 03/16/2023    Mixed hyperlipidemia  -     rosuvastatin (CRESTOR) 10 MG tablet; Take 1 tablet (10 mg total) by mouth every evening.  Dispense: 90 tablet; Refill: 3  -     Lipid Panel; Future; Expected date: 03/16/2023    Encounter for screening mammogram for malignant neoplasm of breast  -     Mammo Digital Screening Bilat w/ Carlos; Future; Expected date: 03/16/2023    Screening for HIV (human immunodeficiency virus)  -     HIV 1/2 Ag/Ab (4th Gen); Future; Expected date: 03/16/2023    Need for hepatitis C screening test  -     HEPATITIS C ANTIBODY; Future; Expected date: 03/16/2023      Pt today presents for an annual physical/interval exam today for adult wellness exam.  And surveilance of chronic medical conditions, reporting   no concerns.    The following chronic conditions were addressed: Hyperlipidemia which is not controlled with medications Atorvastatin 40 mg oral daily .        Health  Maintenance Addressed:  patient asked to schedule her pap smear, patient asked to schedule her mammogram, lipids and diet, screening for HIV and Hep C.    Known/Pending Diagnostic Lab/Radiological/Pulmonary/CardiacTests Results   Other above.  The results are pending     Any radiology study is interpreted by radiology.    Any EKG was read by me but will be over-read by a cardiologist and the patient will be contacted about this reading..    Prescribing Considerations:  I have reviewed the following additional tests today: Liver function is normal per 2020 labs.  Medication adjustments are not necessary, based on this.    Referred to No referrals made at this visit. .                Education:    Pt advised to perform comfort measures/treatment recommended on patient instruction sheet, which were reviewed at the time of the visit..    Follow Up: Annual physical    Reminded the patient if there are any concerns, call here, PCP office or [facility] ON [facility].  If urgent concerns, the patient is advised to call here, the PCP office or [facility] ON [facility] or go to the URGENT CARE or ER.    Explained exam findings, diagnosis and treatment plan to patient alone.  Questions answered and patient states understanding.

## 2023-03-16 NOTE — Clinical Note
Please call pt to schedule get acquainted appointment.  Thank you! MARTA Doyle, CNP, FNP-BC [facility]

## 2023-03-16 NOTE — PATIENT INSTRUCTIONS
Continue heart healthy diet.  Increase water.  Labs  Medicines are refilled.    Women's health appointment  Mammogram.    Choose a primary care provider.

## 2023-03-22 ENCOUNTER — HOSPITAL ENCOUNTER (OUTPATIENT)
Dept: RADIOLOGY | Facility: HOSPITAL | Age: 63
Discharge: HOME | End: 2023-03-22
Attending: NURSE PRACTITIONER
Payer: COMMERCIAL

## 2023-03-22 VITALS — BODY MASS INDEX: 32.79 KG/M2 | WEIGHT: 167 LBS | HEIGHT: 60 IN

## 2023-03-22 DIAGNOSIS — Z12.31 ENCOUNTER FOR SCREENING MAMMOGRAM FOR MALIGNANT NEOPLASM OF BREAST: ICD-10-CM

## 2023-03-22 PROCEDURE — 77067 SCR MAMMO BI INCL CAD: CPT | Mod: 26,,, | Performed by: RADIOLOGY

## 2023-03-22 PROCEDURE — 77067 MAMMO DIGITAL SCREENING BILAT WITH TOMO: ICD-10-PCS | Mod: 26,,, | Performed by: RADIOLOGY

## 2023-03-22 PROCEDURE — 77067 SCR MAMMO BI INCL CAD: CPT | Mod: TC,PO

## 2023-03-22 PROCEDURE — 77063 MAMMO DIGITAL SCREENING BILAT WITH TOMO: ICD-10-PCS | Mod: 26,,, | Performed by: RADIOLOGY

## 2023-03-22 PROCEDURE — 77063 BREAST TOMOSYNTHESIS BI: CPT | Mod: 26,,, | Performed by: RADIOLOGY

## 2023-04-15 ENCOUNTER — TELEPHONE (OUTPATIENT)
Dept: FAMILY MEDICINE | Facility: CLINIC | Age: 63
End: 2023-04-15
Payer: COMMERCIAL

## 2023-04-15 NOTE — TELEPHONE ENCOUNTER
----- Message from Martina Sung NP sent at 3/29/2023  1:32 PM CDT -----  Good afternoon!    I have your lab test results:    Your lipids (cholesterol) .does show concerns. Your triglycerides were still elevated.  You can reduce this with exercise, taking a supplement called Omega 3 fatty acids and Continue with a heart healthy diet.  Your comprehensive metabolic profile (kidney & liver function, protein, electrolytes, calcium) does show concerns.  Your blood sugar is elevated. And your kidney function is a bit low.  Your Complete blood cell count shows blood cells are in appropriate numbers,.  Your HIV screen was Negative.  Your hepatitis C screen was Negative..    Please call if you have any quesitons.  Lisa Sung, APRN, CNP, FNP-BC  [facility]

## 2023-07-09 DIAGNOSIS — G35 MS (MULTIPLE SCLEROSIS): ICD-10-CM

## 2023-07-10 RX ORDER — GABAPENTIN 600 MG/1
TABLET ORAL
Qty: 150 TABLET | Refills: 5 | Status: SHIPPED | OUTPATIENT
Start: 2023-07-10 | End: 2024-02-17

## 2023-07-21 ENCOUNTER — PATIENT MESSAGE (OUTPATIENT)
Dept: PSYCHIATRY | Facility: CLINIC | Age: 63
End: 2023-07-21
Payer: COMMERCIAL

## 2023-07-24 ENCOUNTER — PATIENT MESSAGE (OUTPATIENT)
Dept: PSYCHIATRY | Facility: CLINIC | Age: 63
End: 2023-07-24
Payer: COMMERCIAL

## 2023-08-18 DIAGNOSIS — F33.1 MAJOR DEPRESSIVE DISORDER, RECURRENT EPISODE, MODERATE: ICD-10-CM

## 2023-08-18 DIAGNOSIS — F51.04 CHRONIC INSOMNIA: ICD-10-CM

## 2023-08-24 DIAGNOSIS — G35 MULTIPLE SCLEROSIS: ICD-10-CM

## 2023-08-24 DIAGNOSIS — R53.82 CHRONIC FATIGUE: ICD-10-CM

## 2023-08-24 DIAGNOSIS — R41.840 CONCENTRATION DEFICIT: ICD-10-CM

## 2023-08-24 RX ORDER — DEXTROAMPHETAMINE SACCHARATE, AMPHETAMINE ASPARTATE, DEXTROAMPHETAMINE SULFATE AND AMPHETAMINE SULFATE 5; 5; 5; 5 MG/1; MG/1; MG/1; MG/1
1 TABLET ORAL 2 TIMES DAILY
Qty: 60 TABLET | Refills: 0 | Status: SHIPPED | OUTPATIENT
Start: 2023-08-24 | End: 2023-11-03 | Stop reason: SDUPTHER

## 2023-08-24 RX ORDER — QUETIAPINE FUMARATE 50 MG/1
50 TABLET, FILM COATED ORAL NIGHTLY
Qty: 90 TABLET | Refills: 0 | Status: SHIPPED | OUTPATIENT
Start: 2023-08-24 | End: 2023-11-03 | Stop reason: SDUPTHER

## 2023-09-11 ENCOUNTER — OFFICE VISIT (OUTPATIENT)
Dept: OPTOMETRY | Facility: CLINIC | Age: 63
End: 2023-09-11
Payer: COMMERCIAL

## 2023-09-11 DIAGNOSIS — H52.4 PRESBYOPIA OF BOTH EYES: ICD-10-CM

## 2023-09-11 DIAGNOSIS — Z98.890 HISTORY OF LASER REFRACTIVE SURGERY: ICD-10-CM

## 2023-09-11 DIAGNOSIS — H52.203 ASTIGMATISM OF BOTH EYES, UNSPECIFIED TYPE: ICD-10-CM

## 2023-09-11 DIAGNOSIS — H25.13 NUCLEAR SCLEROSIS OF BOTH EYES: ICD-10-CM

## 2023-09-11 DIAGNOSIS — G35 MULTIPLE SCLEROSIS: ICD-10-CM

## 2023-09-11 DIAGNOSIS — H21.562 AFFERENT PUPILLARY DEFECT OF LEFT EYE: ICD-10-CM

## 2023-09-11 DIAGNOSIS — H40.053 OCULAR HYPERTENSION, BILATERAL: Primary | ICD-10-CM

## 2023-09-11 PROCEDURE — 1159F PR MEDICATION LIST DOCUMENTED IN MEDICAL RECORD: ICD-10-PCS | Mod: CPTII,S$GLB,, | Performed by: OPTOMETRIST

## 2023-09-11 PROCEDURE — 92014 PR EYE EXAM, EST PATIENT,COMPREHESV: ICD-10-PCS | Mod: S$GLB,,, | Performed by: OPTOMETRIST

## 2023-09-11 PROCEDURE — 99999 PR PBB SHADOW E&M-EST. PATIENT-LVL III: CPT | Mod: PBBFAC,,, | Performed by: OPTOMETRIST

## 2023-09-11 PROCEDURE — 92015 DETERMINE REFRACTIVE STATE: CPT | Mod: S$GLB,,, | Performed by: OPTOMETRIST

## 2023-09-11 PROCEDURE — 92014 COMPRE OPH EXAM EST PT 1/>: CPT | Mod: S$GLB,,, | Performed by: OPTOMETRIST

## 2023-09-11 PROCEDURE — 1159F MED LIST DOCD IN RCRD: CPT | Mod: CPTII,S$GLB,, | Performed by: OPTOMETRIST

## 2023-09-11 PROCEDURE — 92015 PR REFRACTION: ICD-10-PCS | Mod: S$GLB,,, | Performed by: OPTOMETRIST

## 2023-09-11 PROCEDURE — 99999 PR PBB SHADOW E&M-EST. PATIENT-LVL III: ICD-10-PCS | Mod: PBBFAC,,, | Performed by: OPTOMETRIST

## 2023-09-11 NOTE — PATIENT INSTRUCTIONS
Prior finding of bilateral ocular hypertension   Satisfactory response to latanoprost in each eye.    IOP now high- normal range:  20 mm  HG In OD and 19 mm HG in OS  - note pre-treatment IOP of 28 mm Hg in the right eye and 27 mm Hg in the left eye).  HVF test done on previous visit.  See previous notes.  Suggest repeat Dodd Visual Field (HVF) test (24-2) at this time.   Assistant to call to schedule HVF test and follow up visit with me (Dr. Murray) on the same date.  In the interim,.continue to instill one drop of Latanoprost 0.005% ophthalmic solution into each eye once per day.     Prior diagnosis of multiple sclerosis.  Note mild afferent pupil defect in the left eye, suggestive of prior episode of optic neuritis in the left eye (secondary to MS).     Astigmatic refractive error in each eye, with very satisfactory best-corrected VA in each eye:  20/20 (-) in the right eye and 20/25-1 in the left eye.  Presbyopia consistent with age.  New spectacle lens Rx issued for use as desired.  Recommend full-time wear.      Repeat refraction in one year.

## 2023-09-12 NOTE — PROGRESS NOTES
HPI     eye examination             Comments: Overdue general eye examination and refractio,  History of multiple sclerosis.  History or optic neuritis in OS many years   ago  Has glasses - wears almost full -time.            Comments    Patient's age: 63 y.o. WF   Occupation: Disabled due to MS  Approximate date of last eye examination: 02/21/2022  Name of last eye doctor seen: Dr. Murray  Wears glasses? Yes      If yes, wears  Full-time or part-time?  full time   Present glasses are: Bifocal, SV Distance, SV Reading?  Bifocals lens   Any problem with VA with glasses?  no  Wears CLs?:  no   Headaches?  no   Eye pain/discomfort?  no                                                                                     Flashes?  no   Floaters?  no  Diplopia/Double vision?  no   Patient's Ocular History:          Any eye surgeries? LASIK in  2000 ([facility])         Any eye injury?  no          Any treatment for eye disease?   bilateral ocular hypertension -has   been  using Latanoprost 0.005% ophthalmic solution in both eyes daily.    Last visual field test done over a year ago at [facility]  Family history of eye disease?  no   Significant patient medical history:         1. Diabetes?  no      If yes, IDDM or NIDDM?  n/a        2. HBP?  no               3. Other (describe):  Multiple Sclerosis, Fibromyalgia    ! OTC eyedrops currently using:  no    ! Prescription eye meds currently using:  Latanoprost daily at night [facility]    ! Any history of allergy/adverse reaction to any eye meds used   previously?  no    ! Any history of allergy/adverse reaction to eyedrops used during prior   eye exam(s)? no    ! Any history of allergy/adverse reaction to Novacaine or similar meds?   no    ! Any history of allergy/adverse reaction to Epinephrine or similar meds?   no    ! Patient okay with use of anesthetic eyedrops to check eye pressure?    yes        ! Patient okay with use of eyedrops to dilate pupils today?  yes    !   "Allergies/Medications/Medical History/Family History reviewed today?    yes       PD =   65/61  Desired reading distance = 16"                                        Last edited by Tony Murray, OD on 9/11/2023 11:10 AM.            Assessment /Plan     For exam results, see Encounter Report.    1. Ocular hypertension, bilateral        2. Afferent pupillary defect of left eye        3. Multiple sclerosis        4. History of laser refractive surgery        5. Astigmatism of both eyes, unspecified type        6. Presbyopia of both eyes        7. Nuclear sclerosis of both eyes                     Prior finding of bilateral ocular hypertension   Satisfactory response to latanoprost in each eye.    IOP now high- normal range:  20 mm  HG In OD and 19 mm HG in OS  - note pre-treatment IOP of 28 mm Hg in the right eye and 27 mm Hg in the left eye).  HVF test done on previous visit.  See previous notes.  Suggest repeat Dodd Visual Field (HVF) test (24-2) at this time.   Assistant to call to schedule HVF test and follow up visit with me (Dr. Murray) on the same date.  In the interim,.continue to instill one drop of Latanoprost 0.005% ophthalmic solution into each eye once per day.     Prior diagnosis of multiple sclerosis.  Note mild afferent pupil defect in the left eye, suggestive of prior episode of optic neuritis in the left eye (secondary to MS).     Astigmatic refractive error in each eye, with very satisfactory best-corrected VA in each eye:  20/20 (-) in the right eye and 20/25-1 in the left eye.  Presbyopia consistent with age.  New spectacle lens Rx issued for use as desired.  Recommend full-time wear.      Repeat refraction in one year.         "

## 2023-09-20 DIAGNOSIS — H40.053 OCULAR HYPERTENSION, BILATERAL: ICD-10-CM

## 2023-09-25 ENCOUNTER — TELEPHONE (OUTPATIENT)
Dept: NEUROLOGY | Facility: CLINIC | Age: 63
End: 2023-09-25
Payer: COMMERCIAL

## 2023-09-25 ENCOUNTER — PATIENT MESSAGE (OUTPATIENT)
Dept: NEUROLOGY | Facility: CLINIC | Age: 63
End: 2023-09-25
Payer: COMMERCIAL

## 2023-09-25 RX ORDER — LATANOPROST 50 UG/ML
1 SOLUTION/ DROPS OPHTHALMIC DAILY
Qty: 7.5 ML | Refills: 3 | Status: SHIPPED | OUTPATIENT
Start: 2023-09-25 | End: 2024-02-22

## 2023-09-25 NOTE — TELEPHONE ENCOUNTER
Called pt to schedule her for an annual appt with Dr. Minaya. I told pt that she is supposed to be due for her annual with Dr. Minaya in November, but BB's next available is in February. I told pt that if she wishes to be seen sooner, I can schedule her with Sue. Pt agreed. I scheduled pt for appt with CB on 11/14 at 1:00 PM. I also noticed pt had an active MRI order in so I scheduled pt for her MRI on 10/18 at 1:30 PM. Pt said she dropped off some insurance paperwork the other day because she was at [facility] for another appt. Pt asked me if we received the paperwork. I told pt I did not receive, but I would have to check with the rest of the clinic to see if someone had it. I told pt if there was any issues I would reach out to her. Pt verbalized understanding.

## 2023-09-27 DIAGNOSIS — F41.1 GENERALIZED ANXIETY DISORDER: ICD-10-CM

## 2023-09-29 NOTE — TELEPHONE ENCOUNTER
Faxed disability update to [employer] [facility] [employer] [phone].  Sent copy to pt via [employer].

## 2023-10-04 RX ORDER — ALPRAZOLAM 0.5 MG/1
TABLET ORAL
Qty: 60 TABLET | Refills: 1 | Status: SHIPPED | OUTPATIENT
Start: 2023-10-04 | End: 2023-11-03 | Stop reason: SDUPTHER

## 2023-10-10 DIAGNOSIS — F33.1 MAJOR DEPRESSIVE DISORDER, RECURRENT EPISODE, MODERATE: ICD-10-CM

## 2023-10-10 DIAGNOSIS — F51.04 CHRONIC INSOMNIA: ICD-10-CM

## 2023-10-12 RX ORDER — QUETIAPINE FUMARATE 25 MG/1
TABLET, FILM COATED ORAL
Qty: 180 TABLET | Refills: 0 | Status: SHIPPED | OUTPATIENT
Start: 2023-10-12 | End: 2023-11-03 | Stop reason: SDUPTHER

## 2023-10-13 ENCOUNTER — PATIENT MESSAGE (OUTPATIENT)
Dept: NEUROLOGY | Facility: CLINIC | Age: 63
End: 2023-10-13
Payer: COMMERCIAL

## 2023-10-18 ENCOUNTER — HOSPITAL ENCOUNTER (OUTPATIENT)
Dept: RADIOLOGY | Facility: HOSPITAL | Age: 63
Discharge: HOME | End: 2023-10-18
Attending: PSYCHIATRY & NEUROLOGY
Payer: COMMERCIAL

## 2023-10-18 DIAGNOSIS — G35 MS (MULTIPLE SCLEROSIS): ICD-10-CM

## 2023-10-18 PROCEDURE — 70551 MRI BRAIN STEM W/O DYE: CPT | Mod: 26,,, | Performed by: RADIOLOGY

## 2023-10-18 PROCEDURE — 70551 MRI BRAIN STEM W/O DYE: CPT | Mod: TC,PO

## 2023-10-18 PROCEDURE — 70551 MRI BRAIN DEMYELINATING WITHOUT CONTRAST: ICD-10-PCS | Mod: 26,,, | Performed by: RADIOLOGY

## 2023-11-03 ENCOUNTER — TELEMEDICINE (OUTPATIENT)
Dept: PSYCHIATRY | Facility: CLINIC | Age: 63
End: 2023-11-03
Payer: COMMERCIAL

## 2023-11-03 DIAGNOSIS — G89.4 CHRONIC PAIN SYNDROME: ICD-10-CM

## 2023-11-03 DIAGNOSIS — G35 MULTIPLE SCLEROSIS: ICD-10-CM

## 2023-11-03 DIAGNOSIS — R41.840 CONCENTRATION DEFICIT: ICD-10-CM

## 2023-11-03 DIAGNOSIS — F33.1 MAJOR DEPRESSIVE DISORDER, RECURRENT EPISODE, MODERATE: Primary | ICD-10-CM

## 2023-11-03 DIAGNOSIS — R53.82 CHRONIC FATIGUE: ICD-10-CM

## 2023-11-03 DIAGNOSIS — F51.04 CHRONIC INSOMNIA: ICD-10-CM

## 2023-11-03 DIAGNOSIS — F41.1 GENERALIZED ANXIETY DISORDER: ICD-10-CM

## 2023-11-03 RX ORDER — ALPRAZOLAM 0.5 MG/1
0.5 TABLET ORAL 2 TIMES DAILY PRN
Qty: 60 TABLET | Refills: 3 | Status: SHIPPED | OUTPATIENT
Start: 2023-11-03

## 2023-11-03 RX ORDER — DEXTROAMPHETAMINE SACCHARATE, AMPHETAMINE ASPARTATE, DEXTROAMPHETAMINE SULFATE AND AMPHETAMINE SULFATE 5; 5; 5; 5 MG/1; MG/1; MG/1; MG/1
1 TABLET ORAL 2 TIMES DAILY
Qty: 60 TABLET | Refills: 0 | Status: SHIPPED | OUTPATIENT
Start: 2023-11-03 | End: 2024-01-12 | Stop reason: SDUPTHER

## 2023-11-03 RX ORDER — DULOXETIN HYDROCHLORIDE 60 MG/1
120 CAPSULE, DELAYED RELEASE ORAL DAILY
Qty: 60 CAPSULE | Refills: 6 | Status: SHIPPED | OUTPATIENT
Start: 2023-11-03

## 2023-11-03 RX ORDER — BUPROPION HYDROCHLORIDE 300 MG/1
300 TABLET ORAL DAILY
Qty: 90 TABLET | Refills: 2 | Status: SHIPPED | OUTPATIENT
Start: 2023-11-03

## 2023-11-03 RX ORDER — DEXTROAMPHETAMINE SACCHARATE, AMPHETAMINE ASPARTATE, DEXTROAMPHETAMINE SULFATE AND AMPHETAMINE SULFATE 5; 5; 5; 5 MG/1; MG/1; MG/1; MG/1
1 TABLET ORAL 2 TIMES DAILY
Qty: 60 TABLET | Refills: 0 | Status: SHIPPED | OUTPATIENT
Start: 2023-12-03

## 2023-11-03 RX ORDER — QUETIAPINE FUMARATE 25 MG/1
TABLET, FILM COATED ORAL
Qty: 180 TABLET | Refills: 2 | Status: SHIPPED | OUTPATIENT
Start: 2023-11-03

## 2023-11-03 RX ORDER — DEXTROAMPHETAMINE SACCHARATE, AMPHETAMINE ASPARTATE, DEXTROAMPHETAMINE SULFATE AND AMPHETAMINE SULFATE 5; 5; 5; 5 MG/1; MG/1; MG/1; MG/1
1 TABLET ORAL 2 TIMES DAILY
Qty: 60 TABLET | Refills: 0 | Status: SHIPPED | OUTPATIENT
Start: 2023-11-03 | End: 2023-11-03 | Stop reason: SDUPTHER

## 2023-11-03 RX ORDER — QUETIAPINE FUMARATE 50 MG/1
50 TABLET, FILM COATED ORAL NIGHTLY
Qty: 90 TABLET | Refills: 2 | Status: SHIPPED | OUTPATIENT
Start: 2023-11-03

## 2023-11-03 RX ORDER — DEXTROAMPHETAMINE SACCHARATE, AMPHETAMINE ASPARTATE, DEXTROAMPHETAMINE SULFATE AND AMPHETAMINE SULFATE 5; 5; 5; 5 MG/1; MG/1; MG/1; MG/1
1 TABLET ORAL 2 TIMES DAILY
Qty: 60 TABLET | Refills: 0 | Status: SHIPPED | OUTPATIENT
Start: 2023-12-03 | End: 2023-11-03 | Stop reason: SDUPTHER

## 2023-11-03 NOTE — PROGRESS NOTES
Last in office appt was 2/11/2020.  Pt will need to come into the office for her next appt.    Outpatient Psychiatry Follow-Up Visit (MD/NP) -- telemedicine visit  11/03/2023    The patient location is: home ([place], [place]).  The chief complaint leading to consultation is: see below    Visit type: audio only     The reason for the audio only service rather than synchronous audio and video virtual visit was related to technical difficulties or patient preference/necessity.     Each patient to whom I provide medical services by telemedicine is:  (1) informed of the relationship between the physician and patient and the respective role of any other health care provider with respect to management of the patient; and (2) notified that they may decline to receive medical services by telemedicine and may withdraw from such care at any time. Patient verbally consented to receive this service via voice-only telephone call.    This service was not originating from a related E/M service provided within the previous 7 days nor will  to an E/M service or procedure within the next 24 hours or my soonest available appointment.  Prevailing standard of care was able to be met in this audio-only visit.      Face to Face time with patient: 25 minutes  30 minutes of total time spent on the encounter, which includes face to face time and non-face to face time preparing to see the patient (eg, review of tests), Obtaining and/or reviewing separately obtained history, Documenting clinical information in the electronic or other health record, Independently interpreting results (not separately reported) and communicating results to the patient/family/caregiver, or Care coordination (not separately reported).     Each patient to whom he or she provides medical services by telemedicine is:  (1) informed of the relationship between the physician and patient and the respective role of any other health care provider with respect to  "management of the patient; and (2) notified that he or she may decline to receive medical services by telemedicine and may withdraw from such care at any time.      Session Length:  30 minutes (E&M level 3)     Clinical Status of Patient: Outpatient (Ambulatory)    Chief Complaint: Karen D Eleser is a 63 y.o. female who presents today for follow-up of chronic depression and anxiety.     Met with patient.     Interval History and Content of Current Session:  Interim Events/Subjective Report/Content of Current Session:  First f/u appt with me since 1/26/2023.  Pt had seen Dr. Tash Clark in the past; last appointment was on 9/28/2015 (this note was reviewed).      Plan from last session:  "Continue all current meds as noted above.   Two Rx for Adderall, each for a 30 day supply with no refills & with "Do not fill until" dates posted accordingly, were mailed to patient's home address as listed in EMR."    She states she has been doing "the same".  Things have been "even keel".    She still has a lot of fatigue--"no energy whatsoever".      She sees Dr. Minaya for MS and fibromyalgia.    She takes Copaxone injections.    She denies AE's.     She is also taking gabapentin 600 mg 2 in AM, 1 at noon and 2 at night.    She continues to have chronic painful neuropathy in her feet, legs due to lumbar disc herniation.    She takes gabapentin for the pain.       She still takes Adderall 20 mg BID -- she does not take it daily, but just on days that she needs an extra "push" with the low energy level.    She states she can definitely tell when it is working and when it wears off.    She adds that it does little for concentration.      She has been doing OK in regards to mood.  Her mood is often affected by how she physically feels.    She mostly isolates self at home, leaves house infrequently to run errands.    She tries to get out to walk, weather permitting.      Current SIGECAPS:    Sleep -- falls asleep OK; has had more " "nights recently when she awakens and has trouble falling back asleep.  Occasionally takes Xanax or tizanidine.    Interests -- low in general.  Still has low structure -- tends to stay at home, watch TV or read.      Guilt -- for some past events with close family members  Energy -- fluctuates; some days OK, some < normal, easily fatigued.       Concentration -- poor due to MS; has a hard time reading      Appetite -- "fine"   Psychomotor --  Somewhat decreased   Suicidal ideation -- denies      She continues to see her pain management specialist at [facility] on [address].    She has used medical marijuana in the past.        I have encouraged pt to keep in touch with others (friends, relatives).      Past psych meds: celexa, lexapro, and effexor in the past.    From previous sessions:  Her father had endocarditis when he was younger.  He then had to have a pacemaker, then dual pacemaker/defibrillator.  He also had 3 sisters (her aunts) who all had heart disease (I did not asked if they smoked).  He had 3 older brothers who have been healthy.  She denies heart disease on her mother's side.    Her sister  from a massive MI at age 55 yo.  However, she abused pills and OTC meds and had gone to rehab at [place] just months prior to her death.  She notes she and her sister were very close; they even lived together for a while.    Pt has denied ever having problems with her heart.  She stated she never had a stress test, but she has had "plenty of" EKG's.   --Pt had neuropsychological testing on 19 with Dr. Butt for interpretation.  It did not show anything significant besides some slowing of processing speed thought likely due to the MS.   She had been treated by Dr. Royce Pina for MS until his death in .  She has also been treated by other neurologists.  Dr. Maia Minaya is her neurologist currently.   --Regarding her mom and sister:  Mother passed: 2015. Sister passed: " "2014. Sister had problems with drugs and alcohol, was a nurse and lost her nursing license.  She  from a massive MI while in the long-term house after substance rehab.  She was close to them both.  She had enjoyed going to the movies, going out to eat, travel, etc.  However, she had always done these things with her sister, who  in 2014.  She has no one in her life that she is close to compared to her sister in the past.  She has a brother who is 6 years younger -- "he can be very abrasive, he talks real loud".  "He also has his life, his children".  She also notes he does not understand or appreciate her mental illness.   --Regarding past intimate relationships: She has had just one very serious relationship that lasted for 10 years.  She states the relationship ended because he lied to her & told her that he worked and stayed at his mother's house, caring for her.  However, he did not work, lived off his mother's SS, then was kicked out of the mom's trailer by his siblings after she , so he was homeless for a while.  She thinks he may be living with his ex-wife now.  She states this happened about 5 years ago.    Since then, she had met 2 guys on line ([employer]) -- went out with each of them a few times, but nothing ever came of this.    She notes her mother was very controlling.  Pt also was shy growing up.  Pt denies ever being sexually molested.  She denies ever having problems with alcohol or drugs.  She is out on disability from work ([employer]) as a .    She is still getting a check through her work disability co.     PSYCHOTHERAPY ADD-ON [id number]   30 (16-37*) minutes   Target symptoms: depression, anxiety, fatigue  Why chosen therapy is appropriate versus another modality: relevant to diagnosis, patient responds to this modality  Outcome monitoring methods: self-report  Therapeutic intervention type: supportive psychotherapy  Topics discussed/themes: stress related to " medical comorbidities, life stage transitional issues, grief, social isolation, self care/nurturing.  The patient's response to the intervention is accepting.   The patient's progress toward treatment goals is fair.  Duration of intervention: 16 minutes      Review of Systems   PSYCHIATRIC: Pertinant items are noted in the narrative.  CONSTITUTIONAL: Some recent wt gain.  Chronic fatigue.     MUSCULOSKELETAL:  Chronic generalized aches/pains.   NEUROLOGIC: Some occasional mild numbness and tingling in her arms.  + chronic nuropathic pain in her feet/legs.  No weakness, seizures, confusion, memory loss, tremor or other abnormal movements.  ENDOCRINE: No polydipsia or polyuria.  INTEGUMENTARY: No rashes or lacerations.  EYES: No exophthalmos, jaundice or blindness.  ENT: No dizziness, tinnitus or hearing loss.  RESPIRATORY: No shortness of breath.  CARDIOVASCULAR: No tachycardia or chest pain.  GASTROINTESTINAL: No nausea, vomiting, pain, constipation or diarrhea.  GENITOURINARY: No frequency, dysuria.      Past Medical, Family and Social History: The patient's past medical, family and social history have been reviewed and updated as appropriate within the electronic medical record -- see encounter notes.       Current Outpatient Medications:     ALPRAZolam (XANAX) 0.5 MG tablet, TAKE 1 TABLET BY MOUTH UP TO TWICE DAILY AS NEEDED FOR ANXIETY, Disp: 60 tablet, Rfl: 1    buPROPion (WELLBUTRIN XL) 300 MG 24 hr tablet, Take 1 tablet (300 mg total) by mouth once daily., Disp: 90 tablet, Rfl: 0    cholecalciferol, vitamin D3, (VITAMIN D3) 5,000 unit Tab, Take 5,000 Units by mouth once daily. , Disp: , Rfl:     DENTA 5000 PLUS 1.1 % Crea, USE AS DIRECTED DAILY, Disp: , Rfl:     dextroamphetamine-amphetamine (ADDERALL) 20 mg tablet, Take 1 tablet by mouth 2 (two) times daily., Disp: 60 tablet, Rfl: 0    dextroamphetamine-amphetamine (ADDERALL) 20 mg tablet, Take 1 tablet by mouth 2 (two) times daily., Disp: 60 tablet, Rfl:  0    diclofenac sodium (VOLTAREN) 1 % Gel, Apply 2 g topically 4 (four) times daily., Disp: 5 Tube, Rfl: 0    DULoxetine (CYMBALTA) 60 MG capsule, Take 2 capsules (120 mg total) by mouth once daily., Disp: 60 capsule, Rfl: 5    FLUZONE QUAD 3091-9376, PF, 60 mcg (15 mcg x 4)/0.5 mL Syrg, TO BE ADMINISTERED BY PHARMACIST FOR IMMUNIZATION, Disp: , Rfl: 0    gabapentin (NEURONTIN) 600 MG tablet, TAKE 2 TABLETS EVERY MORNING, 1 TABLET AT NOON AND 2 TABLETS AT BEDTIME, Disp: 150 tablet, Rfl: 5    glatiramer (COPAXONE, GLATOPA) 40 mg/mL injection, INJECT ONE SYRINGE SUBCUTANEOUSLY 3 TIMES A WEEK AT LEAST 48 HOURS APART. ALLOW TO WARM TO ROOM TEMP FOR 20 MINUTES. REFRIGERATE., Disp: 12 each, Rfl: 1    hydrocortisone 2.5 % cream, Apply topically., Disp: , Rfl:     ketoconazole (NIZORAL) 2 % shampoo, Apply topically., Disp: , Rfl:     latanoprost 0.005 % ophthalmic solution, Place 1 drop into both eyes once daily., Disp: 7.5 mL, Rfl: 3    meloxicam (MOBIC) 15 MG tablet, TAKE 1 TABLET BY MOUTH EVERY DAY, Disp: 90 tablet, Rfl: 0    multivitamin (THERAGRAN) per tablet, Take by mouth. 1 Tablet Oral Every day, Disp: , Rfl:     PREVIDENT 5000 SENSITIVE 1.1-5 % Pste, Apply topically once daily. , Disp: , Rfl:     QUEtiapine (SEROQUEL) 25 MG Tab, TAKE 1 TO 2 TABLETS BY MOUTH EVERY DAY AT BEDTIME AS NEEDED SLEEP, Disp: 180 tablet, Rfl: 0    QUEtiapine (SEROQUEL) 50 MG tablet, Take 1 tablet (50 mg total) by mouth every evening., Disp: 90 tablet, Rfl: 0    rosuvastatin (CRESTOR) 10 MG tablet, Take 1 tablet (10 mg total) by mouth every evening., Disp: 90 tablet, Rfl: 3    solifenacin (VESICARE) 5 MG tablet, Take 1 tablet (5 mg total) by mouth once daily., Disp: 90 tablet, Rfl: 3    tiZANidine (ZANAFLEX) 2 MG tablet, TAKE 1/2 TABLET BY MOUTH TWICE A DAY THEN 2 TABS AT BEDTIME AS NEEDED, Disp: 270 tablet, Rfl: 3    traMADol (ULTRAM) 50 mg tablet, Take 50 mg by mouth 4 (four) times daily., Disp: , Rfl: 1    She occasionally takes Xanax  "(NOT DAILY) -- takes occasional one tab in day for anxiety, or may take one at night to help her relax and fall asleep.  She also takes tizanidine 2 tabs nightly for sleep and chronic pain.         Gabapentin has been Rx by Dr. Rios; she is taking as above as prescribed -- it is for peripheral neuropathy in her legs/feet.   She also takes Tramadol one tablet up to QID for low back and knee pain.    Regarding Adderall, she generally averages < 1 tablet per day.    (I checked an [facility] pharmacy query.  Her reported use of the Xanax and Adderall is currently consistent with this query).      In the past, she had stated she took medical marijuana that is "prescribed" by her pain management doctor and dispensed by a pharmacy in [place].  It is a tincture in a bottle with a dropper.  It is labelled "" 1 ml dropper that she can take up to TID (takes for pain).  She states it helps with alleviate the pain; she estimates it provides about 50% pain relief.    She takes the THC in conjunction with oral CBD oil -- "(the CBD oil) gives that extra boost".    With this combination, she has needed to use less Tramadol or Norco.    Compliance: yes    Side effects: Ambien -- visual hallucinations and illusions, sleepwalking/sleepeating.     Risk Parameters:  Patient reports no suicidal ideation  Patient reports no homicidal ideation  Patient reports no self-injurious behavior  Patient reports no violent behavior    Exam (detailed: at least 9 elements; comprehensive: all 15 elements)    Constitutional  Vitals:  Most recent vital signs, dated less than 90 days prior to this appointment, were reviewed:     There were no vitals filed for this visit.      NO VITALS DONE TODAY -- VIRTUAL SESSION ONLY     My Vitals       Weight Blood Pressure BMI Heartrate   12/8/2020 159 lb 8 oz  135/84  31.2  69    6/8/2021 150 lb       9/27/2021 149 lb 14.6 oz   29.3     11/8/2021 158 lb 8.2 oz  120/80  31  109    11/29/2022 175 lb 6 oz  127/81  " "34.3  81    3/16/2023 167 lb 8.8 oz  124/82   98    3/22/2023 167 lb   32.6         Vitals - 1 value per visit 10/1/2019 10/16/2019 10/30/2019 2/11/2020   SYSTOLIC 123 131 99 143   DIASTOLIC 85 79 65 82   PULSE 78 91 70 84   TEMPERATURE   98.2    SPO2       Weight (lb) 153.22 153 151 154.1   Weight (kg) 69.5 69.4 68.493 69.9   HEIGHT 5' 0" 5' 0" 5' 0" 5' 0"   BODY MASS INDEX 29.92 29.88 29.49 30.1   VISIT REPORT       Pain Score  7  6         General:  unremarkable, age appropriate, well dressed, neatly groomed     Musculoskeletal  Muscle Strength/Tone:  N/A (virtual/audio session only)   Gait & Station:  N/A (virtual/audio session only)     Psychiatric  Speech:  normal tone, normal rate, normal pitch, normal volume, spontaneous    Mood & Affect:  "OK"  N/A (audio session only)   Thought Process:  normal and logical, mildly circumstantial   Associations:  intact    Thought Content:  normal, no suicidality, no homicidality, delusions, or paranoia    Insight & Judgement:  good, intact  adequate to circumstances, age appropriate, good    Orientation:  grossly intact, person, place, time/date, situation    Memory:  intact for content of interview, grossly intact    Language:  grossly intact    Attention Span & Concentration:  able to focus    Fund of Knowledge:  intact and appropriate to age and level of education, familiar with aspects of current personal life      AIMS:  Not done today (audio visit only).      Prior EKG:  Test Reason : Z79.899  Vent. Rate : 077 BPM     Atrial Rate : 077 BPM     P-R Int : 136 ms          QRS Dur : 090 ms      QT Int : 406 ms       P-R-T Axes : 033 000 073 degrees     QTc Int : 459 ms  Normal sinus rhythm  Poor R wave progression  Abnormal ECG  When compared with ECG of 21-JUL-2008 08:45,  Nonspecific T wave abnormality no longer evident in Inferior leads  QT has lengthened  Confirmed by Marty VEE, Yaneli ([id number]) on 8/11/2017 4:36:19 PM      Assessment and Diagnosis    Status/Progress: " "Based on the examination today, the patient's problem(s) is/are adequately, but not ideally controlled. New problems have not been presented today. Comorbidities (chronic pain) are complicating management of the primary condition. There are no active rule-out diagnoses for this patient at this time.    General Impression:   Major Depressive Disorder, Recurrent: Mild  Dysthymic Disorder   Generalized Anxiety Disorder  Chronic Insomnia   Multiple Sclerosis (NOT CODED)   Also: chronic pain, CHELSEA    Intervention/Counseling/Treatment Plan    Current medication management:  Continue all current meds as noted above.   Two Rx for Adderall, each for a 30 day supply with no refills & with "Do not fill until" dates posted accordingly, were e-Rx to pt's preferred pharmacy on file.      Additional Notes:  Encouraged individual psychotherapy for support. Patient had been meeting with LCSW in Neurology clinic; she can continue this (if insurance allows) or consider f/u with Dr. Nery Toledo or other therapist in our clinic.     Return to Clinic: ~ 4 months, or sooner prn.  IN OFFICE VISIT REQUIRED NEXT TIME.       "

## 2023-11-05 DIAGNOSIS — G89.4 CHRONIC PAIN SYNDROME: ICD-10-CM

## 2023-11-05 DIAGNOSIS — F33.1 MAJOR DEPRESSIVE DISORDER, RECURRENT EPISODE, MODERATE: ICD-10-CM

## 2023-11-06 RX ORDER — DULOXETIN HYDROCHLORIDE 60 MG/1
120 CAPSULE, DELAYED RELEASE ORAL
Qty: 60 CAPSULE | Refills: 6 | OUTPATIENT
Start: 2023-11-06

## 2023-11-13 NOTE — PROGRESS NOTES
"Patient ID: Karen D Eleser is a 63 y.o. female who presents today for a routine clinic visit for MS.  She was last seen by Dr. Minaya on 11/29/2022.  The history was provided by the patient.     Principle neurological diagnosis: MS   Date of symptom onset: 1980s  Date of diagnosis: 2005  Disease type at diagnosis: RR  Disease type currently: RR  Previous therapy: NA  Current therapy: Copaxone 2007 - present   Vitamin D Dose: 5000IU daily   Last MRI Brain: 10/18/2023 - stable   Last MRI C-spine: 5/15/2019 - no lesions  Last MRI T-spine: 5/15/2019 - no lesions   CSF: 7/17/2009 - 5 OCB  JCV status: NA  Other relevant labs and tests: 3/12/2020: NMO - negative, 3/22/2023: Vitamin D - 78    Subjective:     The only big issue she states she has is fatigue.  She has just restarted taking CoQ10.     Left arm and left leg stay "numb and tingly", gabapentin helps     She still has word finding issues, she did cognitive testing in 2019. Staying stable.     She is currently trying to find a new PCP and pain management specialist.     She denies weakness, sensory changes, dysphagia, dysarthria, spasticity, visual changes, cognitive changes, mood disorder, gait disturbance, incoordination and bowel dysfunction.     ROS:      12/8/2020    10:43 AM   REVIEW OF SYMPTOMS   Do you feel abnormally tired on most days? Yes   Do you feel you generally sleep well? No   Do you have difficulty controlling your bladder?  Yes   Do you have difficulty controlling your bowels?  No   Do you have frequent muscle cramps, tightness or spasms in your limbs?  Yes   Do you have new visual symptoms?  No   Do you have worsening difficulty with your memory or thinking? Yes   Do you have worsening symptoms of anxiety or depression?  No   For patients who walk, Do you have more difficulty walking?  No   Have you fallen since your last visit?  Yes   For patients who use wheelchairs: Do you have any skin wounds or breakdown? Not Applicable   Do you have " difficulty using your hands?  No   Do you have shooting or burning pain? Yes   Do you have difficulty with sexual function?  No   If you are sexually active, are you using birth control? Y/N  N/A Not Applicable   Do you often choke when swallowing liquids or solid food?  No   Do you experience worsening symptoms when overheated? Yes   Do you need any new equipment such as a wheelchair, walker or shower chair? No   Do you receive co-pay financial assistance for your principal MS medicine? Yes   Would you be interested in participating in an MS research trial in the future? Yes   For patients on Gilenya, Tecfidera, Aubagio, Rituxan, Ocrevus, Tysabri, Lemtrada or Methotrexate, are you aware that you should NOT receive live virus vaccines?  Not Applicable   Do you feel you have adequate family/friend support?  No   Do you have health insurance?   Yes   Are you currently employed? No   Do you receive SSDI/SSI?  No   Do you use marijuana or cannabis products? Yes   How often? Daily   Have you been diagnosed with a urinary tract infection since your last visit here? No   Have you been diagnosed with a respiratory tract infection since your last visit here? No   Have you been to the emergency room since your last visit here? Yes   Have you been hospitalized since your last visit here?  Yes            12/8/2020    10:49 AM   FSS SCORE & INTERPRETATION   FSS SCORE  60   FSS SCORE INTERPRETATION May be suffering from fatigue         12/8/2020    10:48 AM   MS JEAN-D SCORE & INTERPRETATION   JEAN-D SCORE  34   JEAN-D INTERPRETATION  Possibility of major Depression         12/8/2020    10:45 AM   MS LIZZETH-7 SCORE & INTERPRETATION   LIZZETH-7 SCORE  10   LIZZETH-7 SCORE INTERPRETATION Moderate Anxiety         12/8/2020    10:50 AM   PEQ MS MOS PAIN EFFECTS SCORE & INTERPRETATION   PES SCORE 27   PES SCORE INTERPRETATION Scores can range from 6-30.  Items are scaled so that higher scores indicate a greater impact of pain on a patients mood  and behavior.         12/8/2020    10:51 AM   MS BLADDER CONTROL SCORE & INTERPRETATION   BLCS SCORE 1   BLCS SCORE INTERPRETATION  Scores can range from 0-22, with higher scores indicating greater bladder control problems.         12/8/2020    10:54 AM   MS BOWEL CONTROL SCORE & INTERPRETATION   BWCS SCORE 0   BWCS SCORE INTERPRETATION Scores can range from 0-26, with higher scores indicating greater bowel control problems.         12/8/2020    10:52 AM   PEQ MS IMPACT OF VISUAL IMPAIRMENT SCORE & INTERPRETATION   JACOB SCALE SCORE  5   JACOB SCORE INTERPRETATION Scores can range from 0-15, with higher scores indicating greater impact of visual problems on daily activites.         12/8/2020    10:54 AM   MS PDQ SCORE & INTERPRETATION   PDQ RETROSPECTIVE MEMORY SUBSCALE 15   PDQ ATTENTION/CONCENTRATION SUBSCALE 15   PDQ PROSPECTIVE MEMORY SUBSCALE 12   PDQ PLANNING/ORGANIZATION SUBSCALE 15   PDQ TOTAL SCORE 57   PDQ SCORE INTERPRETATION Scores can range from 0-80, with higher scores indicating greater perceived cognitive impairment.         12/8/2020    10:57 AM   MSSS SCORE & INTERPRETATION   MSSS TANGIBLE SUPPORT SUBSCALE 0   MSSS EMOTIONAL/INFORMATIONAL SUPPORT SUBSCALE 21.88   MSSS AFFECTIONATE SUPPORT SUBSCALE 25   MSSS POSITIVE SOCIAL INTERACTION SUBSCALE 0   MSSS TOTAL SCORE 11.72   MSSS SCORE INTERPRETATION Scores can range from 0-100, with higher scores indicating greater perceived support.        SOCIAL HISTORY  Living arrangements - the patient lives alone.  Social History     Socioeconomic History    Marital status: Single   Occupational History     Employer: [employer] (MAIN OFFICE)   Tobacco Use    Smoking status: Never    Smokeless tobacco: Never   Substance and Sexual Activity    Alcohol use: Yes     Comment: rare    Drug use: No       Current Outpatient Medications on File Prior to Visit   Medication Sig Dispense Refill    ALPRAZolam (XANAX) 0.5 MG tablet Take 1 tablet (0.5 mg total) by mouth 2 (two)  times daily as needed for Anxiety. 60 tablet 3    buPROPion (WELLBUTRIN XL) 300 MG 24 hr tablet Take 1 tablet (300 mg total) by mouth once daily. 90 tablet 2    cholecalciferol, vitamin D3, (VITAMIN D3) 5,000 unit Tab Take 5,000 Units by mouth once daily.       DENTA 5000 PLUS 1.1 % Crea USE AS DIRECTED DAILY      dextroamphetamine-amphetamine (ADDERALL) 20 mg tablet Take 1 tablet by mouth 2 (two) times daily. 60 tablet 0    [START ON 12/3/2023] dextroamphetamine-amphetamine (ADDERALL) 20 mg tablet Take 1 tablet by mouth 2 (two) times daily. 60 tablet 0    diclofenac sodium (VOLTAREN) 1 % Gel Apply 2 g topically 4 (four) times daily. 5 Tube 0    DULoxetine (CYMBALTA) 60 MG capsule Take 2 capsules (120 mg total) by mouth once daily. 60 capsule 6    FLUZONE QUAD 9203-3027, PF, 60 mcg (15 mcg x 4)/0.5 mL Syrg TO BE ADMINISTERED BY PHARMACIST FOR IMMUNIZATION  0    gabapentin (NEURONTIN) 600 MG tablet TAKE 2 TABLETS EVERY MORNING, 1 TABLET AT NOON AND 2 TABLETS AT BEDTIME 150 tablet 5    glatiramer (COPAXONE, GLATOPA) 40 mg/mL injection INJECT ONE SYRINGE SUBCUTANEOUSLY 3 TIMES A WEEK AT LEAST 48 HOURS APART. ALLOW TO WARM TO ROOM TEMP FOR 20 MINUTES. REFRIGERATE. 12 each 1    hydrocortisone 2.5 % cream Apply topically.      ketoconazole (NIZORAL) 2 % shampoo Apply topically.      latanoprost 0.005 % ophthalmic solution Place 1 drop into both eyes once daily. 7.5 mL 3    meloxicam (MOBIC) 15 MG tablet TAKE 1 TABLET BY MOUTH EVERY DAY 90 tablet 0    multivitamin (THERAGRAN) per tablet Take by mouth. 1 Tablet Oral Every day      PREVIDENT 5000 SENSITIVE 1.1-5 % Pste Apply topically once daily.       QUEtiapine (SEROQUEL) 25 MG Tab TAKE 1 TO 2 TABLETS BY MOUTH EVERY DAY AT BEDTIME AS NEEDED SLEEP 180 tablet 2    QUEtiapine (SEROQUEL) 50 MG tablet Take 1 tablet (50 mg total) by mouth every evening. 90 tablet 2    rosuvastatin (CRESTOR) 10 MG tablet Take 1 tablet (10 mg total) by mouth every evening. 90 tablet 3     solifenacin (VESICARE) 5 MG tablet Take 1 tablet (5 mg total) by mouth once daily. 90 tablet 3    tiZANidine (ZANAFLEX) 2 MG tablet TAKE 1/2 TABLET BY MOUTH TWICE A DAY THEN 2 TABS AT BEDTIME AS NEEDED 270 tablet 3    traMADol (ULTRAM) 50 mg tablet Take 50 mg by mouth 4 (four) times daily.  1     No current facility-administered medications on file prior to visit.       Objective:     1. 25 foot timed walk:      11/29/2022    12:01 AM 11/14/2023    12:01 AM   Timed 25 Foot Walk:   Did patient wear an AFO? No No   Was assistive device used? No No   Time for 25 Foot Walk (seconds) 5.8 5.62   Time for 25 Foot Walk (seconds)  5.9           NEURO EXAM    In general, the patient is well nourished and appears to be in no acute distress.    MENTAL STATUS: language is fluent, normal verbal comprehension, short-term and remote memory is intact, attention is normal, patient is alert and oriented x 3, fund of knowlege is appropriate by vocabulary.     CRANIAL NERVE EXAM:  There is no internuclear ophthalmoplegia.  Extraocular muscles are intact. No facial asymmetry. Facial sensation is intact bilaterally. There is no dysarthria. Uvula is midline, and palate moves symmetrically. Shoulder shrug intact bilaterlly. Tongue protrusion is midline. Hearing is intact to finger rub bilaterally. Neck is supple with full ROM    MOTOR EXAM: Normal bulk and tone throughout UE and LE bilaterally.  Rapid sequential movements are slowed bilateral finger tap; Strength is  5/5 in all groups in the lower extremities and upper extremities    REFLEXES: 2+ and symmetric throughout in all four extremities; toes are down bilaterally; negative Lake's, no cross-adductors    SENSORY EXAM: Normal to light touch, vibration, pinprick and proprioception throughout.    COORDINATION: Normal finger-to-nose exam. Normal heel-to-shin exam.    GAIT: Narrow based and stable. Difficulty with heel walk, toe walk, and perform tandem gait.       Imaging:  "    Personally reviewed and discussed with the patient.     Results for orders placed during the hospital encounter of 10/18/23    MRI Brain Demyelinating Without Contrast    Impression  1. There is a stable burden of white matter disease consistent with the provided diagnosis of multiple sclerosis without imaging findings to suggest interval progression of disease or active demyelination.  It should be noted that technique on the current study is different from the prior studies but given this consideration, the white matter lesions appears stable.  2. There is now complete opacification of the left maxillary sinus.  This has occurred since the prior studies.      Electronically signed by: Earle Soto MD  Date:    10/18/2023  Time:    14:25      Labs:     Lab Results   Component Value Date    PXOTQGLS51OL 78 03/22/2023    [id number] 72 11/08/2021    [id number] 32 12/08/2020     No results found for: "JCVINDEX", "JCVANTIBODY"  No results found for: "MZ6ELDLT", "ABSOLUTECD3", "PC8BQMWI", "ABSOLUTECD8", "JT1KXDII", "ABSOLUTECD4", "LABCD48"  Lab Results   Component Value Date    WBC 9.03 03/22/2023    RBC 4.43 03/22/2023    HGB 13.8 03/22/2023    HCT 41.3 03/22/2023    MCV 93 03/22/2023    MCH 31.2 (H) 03/22/2023    MCHC 33.4 03/22/2023    RDW 13.7 03/22/2023     03/22/2023    MPV 10.9 03/22/2023    GRAN 6.1 03/22/2023    GRAN 67.6 03/22/2023    LYMPH 1.9 03/22/2023    LYMPH 21.3 03/22/2023    MONO 0.7 03/22/2023    MONO 7.2 03/22/2023    EOS 0.3 03/22/2023    BASO 0.06 03/22/2023    EOSINOPHIL 2.8 03/22/2023    BASOPHIL 0.7 03/22/2023     Sodium   Date Value Ref Range Status   03/22/2023 142 136 - 145 mmol/L Final     Potassium   Date Value Ref Range Status   03/22/2023 3.5 3.5 - 5.1 mmol/L Final     Chloride   Date Value Ref Range Status   03/22/2023 99 95 - 110 mmol/L Final     CO2   Date Value Ref Range Status   03/22/2023 30 (H) 23 - 29 mmol/L Final     Glucose   Date Value Ref Range Status " "  03/22/2023 126 (H) 70 - 110 mg/dL Final     BUN   Date Value Ref Range Status   03/22/2023 16 6 - 20 mg/dL Final     Creatinine   Date Value Ref Range Status   03/22/2023 1.1 0.5 - 1.4 mg/dL Final     Calcium   Date Value Ref Range Status   03/22/2023 9.8 8.7 - 10.5 mg/dL Final     Total Protein   Date Value Ref Range Status   03/22/2023 7.0 6.0 - 8.4 g/dL Final     Albumin   Date Value Ref Range Status   03/22/2023 4.4 3.5 - 5.2 g/dL Final     Total Bilirubin   Date Value Ref Range Status   03/22/2023 0.7 0.1 - 1.0 mg/dL Final     Comment:     For infants and newborns, interpretation of results should be based  on gestational age, weight and in agreement with clinical  observations.    Premature Infant recommended reference ranges:  Up to 24 hours.............<8.0 mg/dL  Up to 48 hours............<12.0 mg/dL  3-5 days..................<15.0 mg/dL  6-29 days.................<15.0 mg/dL       Alkaline Phosphatase   Date Value Ref Range Status   03/22/2023 90 55 - 135 U/L Final     AST   Date Value Ref Range Status   03/22/2023 26 10 - 40 U/L Final     ALT   Date Value Ref Range Status   03/22/2023 17 10 - 44 U/L Final     Anion Gap   Date Value Ref Range Status   03/22/2023 13 8 - 16 mmol/L Final     eGFR if    Date Value Ref Range Status   11/08/2021 >60.0 >60 mL/min/1.73 m^2 Final     eGFR if non    Date Value Ref Range Status   11/08/2021 >60.0 >60 mL/min/1.73 m^2 Final     Comment:     Calculation used to obtain the estimated glomerular filtration  rate (eGFR) is the CKD-EPI equation.        No results found for: "HEPBSAG", "HEPBSAB", "HEPBCAB"        MS Impression and Plan:     NEURO MULTIPLE SCLEROSIS IMPRESSION:   MS Status:     Number of relapses in the past year?:  0    Clinical Progression:  Clinically Stable    MRI Progression:  Stable  Plan:     DMT:  No change in management    DMT comment:  Continue Copaxone and vitamin D supplementation     Symptom Management:  No " change in symptom management       POPEYE  MRIs and follow up with Dr. Minaya in 1 year, unless needing to be seen sooner.   Plan discussed and questions were answered to satisfaction.     Problem List Items Addressed This Visit          Neuro    Multiple sclerosis - Primary    Relevant Orders    MRI Brain Demyelinating Without Contrast       I spent a total of 65 minutes on the day of the visit.This includes face to face time and non-face to face time preparing to see the patient (eg, review of tests), obtaining and/or reviewing separately obtained history, documenting clinical information in the electronic or other health record, independently interpreting results and communicating results to the patient/family/caregiver, or care coordinator.       Sue See, ISACC

## 2023-11-14 ENCOUNTER — OFFICE VISIT (OUTPATIENT)
Dept: NEUROLOGY | Facility: CLINIC | Age: 63
End: 2023-11-14
Payer: COMMERCIAL

## 2023-11-14 VITALS
BODY MASS INDEX: 33.26 KG/M2 | DIASTOLIC BLOOD PRESSURE: 110 MMHG | WEIGHT: 170.31 LBS | SYSTOLIC BLOOD PRESSURE: 157 MMHG

## 2023-11-14 DIAGNOSIS — G35 MULTIPLE SCLEROSIS: Primary | ICD-10-CM

## 2023-11-14 PROCEDURE — 99999 PR PBB SHADOW E&M-EST. PATIENT-LVL IV: CPT | Mod: PBBFAC,,,

## 2023-11-14 PROCEDURE — 3008F BODY MASS INDEX DOCD: CPT | Mod: CPTII,S$GLB,,

## 2023-11-14 PROCEDURE — 99215 OFFICE O/P EST HI 40 MIN: CPT | Mod: S$GLB,,,

## 2023-11-14 PROCEDURE — 3080F DIAST BP >= 90 MM HG: CPT | Mod: CPTII,S$GLB,,

## 2023-11-14 PROCEDURE — 1160F PR REVIEW ALL MEDS BY PRESCRIBER/CLIN PHARMACIST DOCUMENTED: ICD-10-PCS | Mod: CPTII,S$GLB,,

## 2023-11-14 PROCEDURE — 1159F MED LIST DOCD IN RCRD: CPT | Mod: CPTII,S$GLB,,

## 2023-11-14 PROCEDURE — 1159F PR MEDICATION LIST DOCUMENTED IN MEDICAL RECORD: ICD-10-PCS | Mod: CPTII,S$GLB,,

## 2023-11-14 PROCEDURE — 3080F PR MOST RECENT DIASTOLIC BLOOD PRESSURE >= 90 MM HG: ICD-10-PCS | Mod: CPTII,S$GLB,,

## 2023-11-14 PROCEDURE — 3077F PR MOST RECENT SYSTOLIC BLOOD PRESSURE >= 140 MM HG: ICD-10-PCS | Mod: CPTII,S$GLB,,

## 2023-11-14 PROCEDURE — 99999 PR PBB SHADOW E&M-EST. PATIENT-LVL IV: ICD-10-PCS | Mod: PBBFAC,,,

## 2023-11-14 PROCEDURE — 3077F SYST BP >= 140 MM HG: CPT | Mod: CPTII,S$GLB,,

## 2023-11-14 PROCEDURE — 99215 PR OFFICE/OUTPT VISIT, EST, LEVL V, 40-54 MIN: ICD-10-PCS | Mod: S$GLB,,,

## 2023-11-14 PROCEDURE — 1160F RVW MEDS BY RX/DR IN RCRD: CPT | Mod: CPTII,S$GLB,,

## 2023-11-14 PROCEDURE — 3008F PR BODY MASS INDEX (BMI) DOCUMENTED: ICD-10-PCS | Mod: CPTII,S$GLB,,

## 2023-12-01 DIAGNOSIS — N31.9 NEUROGENIC BLADDER: ICD-10-CM

## 2023-12-01 DIAGNOSIS — G35 MS (MULTIPLE SCLEROSIS): ICD-10-CM

## 2023-12-01 RX ORDER — SOLIFENACIN SUCCINATE 5 MG/1
5 TABLET, FILM COATED ORAL
Qty: 90 TABLET | Refills: 3 | Status: SHIPPED | OUTPATIENT
Start: 2023-12-01

## 2023-12-11 ENCOUNTER — TELEPHONE (OUTPATIENT)
Dept: NEUROLOGY | Facility: CLINIC | Age: 63
End: 2023-12-11
Payer: COMMERCIAL

## 2023-12-11 NOTE — TELEPHONE ENCOUNTER
----- Message from Mini Suarez sent at 12/11/2023  4:44 PM CST -----   Name of Who is Calling:     What is the request in detail:  request call back in reference to verify fax was received sent on 12/08//23 Please contact to further discuss and advise      Can the clinic reply by [facility]:     What Number to Call Back [facility] not [facility]:  [phone] ext# [phone] / fax# [phone] / [facility]  / [place]

## 2023-12-11 NOTE — LETTER
[facility] - Neurology 7th Fl  [address], [address]  [facility] [address]  Phone: [phone]  Fax: [phone]         2023      RE: Karen D. Eleser, : 1960 ([employer] Claim: [id number])      To Whom It May Concern:  This letter is provided in response to your request dated 23 in which you inquire about Ms. Eleser's ability to return to work.  It is my opinion that she is not able to return to work and should continue receiving disability benefits.    Ms. Eleser experiences sleep difficulties and significant fatigue, urinary dysfunction including hesitancy and urgency, significant spasticity, impaired cognition with short-term memory deficits and word-finding difficulty, and significant neuropathic pain in her feet.  She is especially sensitive to stress, which exacerbates these symptoms and can contribute to worsening mental health, which in turn further affects her multiple sclerosis symptoms.      I can be reached at the above phone number with any questions or concerns related to her case.    Sincerely,    Maia Minaya MD        BB:[email or web address]

## 2023-12-12 NOTE — TELEPHONE ENCOUNTER
Called [employer] [facility] for analyst advising fax was received and will be handled on Friday 12/15.

## 2024-01-12 DIAGNOSIS — R41.840 CONCENTRATION DEFICIT: ICD-10-CM

## 2024-01-12 DIAGNOSIS — R53.82 CHRONIC FATIGUE: ICD-10-CM

## 2024-01-12 DIAGNOSIS — G35 MULTIPLE SCLEROSIS: ICD-10-CM

## 2024-01-13 RX ORDER — DEXTROAMPHETAMINE SACCHARATE, AMPHETAMINE ASPARTATE, DEXTROAMPHETAMINE SULFATE AND AMPHETAMINE SULFATE 5; 5; 5; 5 MG/1; MG/1; MG/1; MG/1
1 TABLET ORAL 2 TIMES DAILY
Qty: 60 TABLET | Refills: 0 | Status: SHIPPED | OUTPATIENT
Start: 2024-01-13

## 2024-01-22 ENCOUNTER — PATIENT MESSAGE (OUTPATIENT)
Dept: PSYCHIATRY | Facility: CLINIC | Age: 64
End: 2024-01-22
Payer: COMMERCIAL

## 2024-02-06 ENCOUNTER — OFFICE VISIT (OUTPATIENT)
Dept: FAMILY MEDICINE | Facility: CLINIC | Age: 64
End: 2024-02-06
Payer: COMMERCIAL

## 2024-02-06 VITALS
TEMPERATURE: 98 F | SYSTOLIC BLOOD PRESSURE: 138 MMHG | OXYGEN SATURATION: 95 % | HEIGHT: 60 IN | HEART RATE: 100 BPM | DIASTOLIC BLOOD PRESSURE: 102 MMHG | WEIGHT: 168.63 LBS | BODY MASS INDEX: 33.11 KG/M2

## 2024-02-06 DIAGNOSIS — F33.0 MDD (MAJOR DEPRESSIVE DISORDER), RECURRENT EPISODE, MILD: ICD-10-CM

## 2024-02-06 DIAGNOSIS — G35 MULTIPLE SCLEROSIS: ICD-10-CM

## 2024-02-06 DIAGNOSIS — F41.1 GAD (GENERALIZED ANXIETY DISORDER): ICD-10-CM

## 2024-02-06 DIAGNOSIS — Z01.419 WELL WOMAN EXAM: Primary | ICD-10-CM

## 2024-02-06 DIAGNOSIS — Z12.31 ENCOUNTER FOR SCREENING MAMMOGRAM FOR MALIGNANT NEOPLASM OF BREAST: ICD-10-CM

## 2024-02-06 DIAGNOSIS — M85.80 OSTEOPENIA, UNSPECIFIED LOCATION: ICD-10-CM

## 2024-02-06 DIAGNOSIS — Z78.0 POSTMENOPAUSAL: ICD-10-CM

## 2024-02-06 DIAGNOSIS — E78.2 MIXED HYPERLIPIDEMIA: ICD-10-CM

## 2024-02-06 DIAGNOSIS — E55.9 VITAMIN D INSUFFICIENCY: ICD-10-CM

## 2024-02-06 DIAGNOSIS — Z23 NEED FOR VACCINATION: ICD-10-CM

## 2024-02-06 PROCEDURE — 3080F DIAST BP >= 90 MM HG: CPT | Mod: CPTII,S$GLB,, | Performed by: FAMILY MEDICINE

## 2024-02-06 PROCEDURE — 1159F MED LIST DOCD IN RCRD: CPT | Mod: CPTII,S$GLB,, | Performed by: FAMILY MEDICINE

## 2024-02-06 PROCEDURE — 90471 IMMUNIZATION ADMIN: CPT | Mod: S$GLB,,, | Performed by: FAMILY MEDICINE

## 2024-02-06 PROCEDURE — 99999 PR PBB SHADOW E&M-EST. PATIENT-LVL V: CPT | Mod: PBBFAC,,, | Performed by: FAMILY MEDICINE

## 2024-02-06 PROCEDURE — 3008F BODY MASS INDEX DOCD: CPT | Mod: CPTII,S$GLB,, | Performed by: FAMILY MEDICINE

## 2024-02-06 PROCEDURE — 3075F SYST BP GE 130 - 139MM HG: CPT | Mod: CPTII,S$GLB,, | Performed by: FAMILY MEDICINE

## 2024-02-06 PROCEDURE — 90686 IIV4 VACC NO PRSV 0.5 ML IM: CPT | Mod: S$GLB,,, | Performed by: FAMILY MEDICINE

## 2024-02-06 PROCEDURE — 1160F RVW MEDS BY RX/DR IN RCRD: CPT | Mod: CPTII,S$GLB,, | Performed by: FAMILY MEDICINE

## 2024-02-06 PROCEDURE — 99396 PREV VISIT EST AGE 40-64: CPT | Mod: 25,S$GLB,, | Performed by: FAMILY MEDICINE

## 2024-02-06 RX ORDER — ROSUVASTATIN CALCIUM 10 MG/1
10 TABLET, COATED ORAL NIGHTLY
Qty: 90 TABLET | Refills: 3 | Status: SHIPPED | OUTPATIENT
Start: 2024-02-06

## 2024-02-06 NOTE — PROGRESS NOTES
Subjective:       Patient ID: Karen D Eleser is a 63 y.o. female.    Chief Complaint: Annual Exam (Wants to establish care)    Here today to establish care with a new PCP.   She does not have a PCP listed but appears to have seen Dr. Carrero in the past.   Was working at [employer] on disability since 2014.    Immunizations: Due Shingrix, RSV, Flu and COVID booster  Last Lab work: March 2023  Colon Ca screening: Colonoscopy 2021  Breast Ca Screening: MMG 3/2023  Cervical Ca Screening: s/p Hyst    MS: She is on long term disability for MS and she is followed by Dr. Shanks (neuro).  She is on Copaxone.  She has been seeing Pain Mgt (Marlene) who has her on Tramadol, Mobic, and Gabapentin.  HLD:  She is on crestor.    MDD/LIZZETH:  She is seeing Psych (Tyler).  She is currently on Wellbutrin, Cymbalta, Seroquel, Xanax and Adderall.  Mood is okay.     OAB:  On Vesicare.  Works well.      Review of Systems   Constitutional:  Negative for activity change, appetite change, fatigue and fever.   HENT:  Positive for postnasal drip (since Aug).    Respiratory:  Negative for cough, shortness of breath and wheezing.    Cardiovascular:  Negative for chest pain and palpitations.   Gastrointestinal:  Negative for abdominal pain, constipation, diarrhea and nausea.   Skin:  Negative for pallor and rash.   Neurological:  Negative for dizziness, syncope, light-headedness and headaches.   Psychiatric/Behavioral:  The patient is not nervous/anxious.        Objective:      Vitals:    02/06/24 1329 02/06/24 1400   BP: (!) 138/108 (!) 138/102   Pulse: 100    Temp: 98.3 °F (36.8 °C)    TempSrc: Oral    SpO2: 95%    Weight: 76.5 kg (168 lb 10.4 oz)    Height: 5' (1.524 m)       Physical Exam  Constitutional:       General: She is not in acute distress.  Cardiovascular:      Rate and Rhythm: Normal rate and regular rhythm.      Heart sounds: Normal heart sounds. No murmur heard.  Pulmonary:      Effort: Pulmonary effort is normal. No  respiratory distress.      Breath sounds: Normal breath sounds. No wheezing, rhonchi or rales.   Skin:     General: Skin is warm and dry.   Neurological:      General: No focal deficit present.      Mental Status: She is alert.   Psychiatric:         Mood and Affect: Mood normal.         Behavior: Behavior normal.         Thought Content: Thought content normal.            Assessment:       1. Well woman exam    2. Mixed hyperlipidemia    3. Multiple sclerosis    4. MDD (major depressive disorder), recurrent episode, mild    5. LIZZETH (generalized anxiety disorder)    6. Encounter for screening mammogram for malignant neoplasm of breast    7. Osteopenia, unspecified location    8. Postmenopausal    9. Vitamin D insufficiency    10. Need for vaccination        Plan:       Well woman exam  -     CBC Auto Differential; Future; Expected date: 02/06/2024  -     Comprehensive Metabolic Panel; Future; Expected date: 02/06/2024  -     Hemoglobin A1C; Future; Expected date: 02/06/2024  -     Lipid Panel; Future; Expected date: 02/06/2024  -     TSH; Future; Expected date: 02/06/2024  -     Urinalysis, Reflex to Urine Culture Urine, Clean Catch; Future; Expected date: 02/06/2024  Continue to work on dietary improvements (decrease overall calorie intake, decrease sugar and carb intake, decrease animal protein intake)  Continue to exercise at least 30-40 minutes, 3 times per week  Immunizations were discussed and flu today.    Preventative exams were discussed and MMG/Dexa ordered   Mixed hyperlipidemia  -     Comprehensive Metabolic Panel; Future; Expected date: 02/06/2024  -     Lipid Panel; Future; Expected date: 02/06/2024  -     rosuvastatin (CRESTOR) 10 MG tablet; Take 1 tablet (10 mg total) by mouth every evening.  Dispense: 90 tablet; Refill: 3  Continue Crestor.  Recheck lipids  Multiple sclerosis  Stable, MGT per neuro  MDD (major depressive disorder), recurrent episode, mild / LIZZETH (generalized anxiety disorder)  Stable.   F/U with Psych  Encounter for screening mammogram for malignant neoplasm of breast  -     Mammo Digital Screening Bilat w/ Carlos; Future; Expected date: 03/23/2024    Osteopenia, unspecified location  -     DXA Bone Density Axial Skeleton 1 or more sites; Future; Expected date: 03/23/2024    Postmenopausal  -     DXA Bone Density Axial Skeleton 1 or more sites; Future; Expected date: 03/23/2024    Vitamin D insufficiency  -     Vitamin D 25-Hydroxy; Future; Expected date: 02/06/2024    Nurse visit for BP check in 2-3 weeks      Medication List with Changes/Refills   Current Medications    ALPRAZOLAM (XANAX) 0.5 MG TABLET    Take 1 tablet (0.5 mg total) by mouth 2 (two) times daily as needed for Anxiety.    BUPROPION (WELLBUTRIN XL) 300 MG 24 HR TABLET    Take 1 tablet (300 mg total) by mouth once daily.    CHOLECALCIFEROL, VITAMIN D3, (VITAMIN D3) 5,000 UNIT TAB    Take 5,000 Units by mouth once daily.     DENTA 5000 PLUS 1.1 % CREA    USE AS DIRECTED DAILY    DEXTROAMPHETAMINE-AMPHETAMINE (ADDERALL) 20 MG TABLET    Take 1 tablet by mouth 2 (two) times daily.    DEXTROAMPHETAMINE-AMPHETAMINE (ADDERALL) 20 MG TABLET    Take 1 tablet by mouth 2 (two) times daily.    DICLOFENAC SODIUM (VOLTAREN) 1 % GEL    Apply 2 g topically 4 (four) times daily.    DULOXETINE (CYMBALTA) 60 MG CAPSULE    Take 2 capsules (120 mg total) by mouth once daily.    GABAPENTIN (NEURONTIN) 600 MG TABLET    TAKE 2 TABLETS EVERY MORNING, 1 TABLET AT NOON AND 2 TABLETS AT BEDTIME    GLATIRAMER (COPAXONE, GLATOPA) 40 MG/ML INJECTION    INJECT ONE SYRINGE SUBCUTANEOUSLY 3 TIMES A WEEK AT LEAST 48 HOURS APART. ALLOW TO WARM TO ROOM TEMP FOR 20 MINUTES. REFRIGERATE.    HYDROCORTISONE 2.5 % CREAM    Apply topically.    KETOCONAZOLE (NIZORAL) 2 % SHAMPOO    Apply topically.    LATANOPROST 0.005 % OPHTHALMIC SOLUTION    Place 1 drop into both eyes once daily.    MELOXICAM (MOBIC) 15 MG TABLET    TAKE 1 TABLET BY MOUTH EVERY DAY    MULTIVITAMIN  (THERAGRAN) PER TABLET    Take by mouth. 1 Tablet Oral Every day    PREVIDENT 5000 SENSITIVE 1.1-5 % PSTE    Apply topically once daily.     QUETIAPINE (SEROQUEL) 25 MG TAB    TAKE 1 TO 2 TABLETS BY MOUTH EVERY DAY AT BEDTIME AS NEEDED SLEEP    QUETIAPINE (SEROQUEL) 50 MG TABLET    Take 1 tablet (50 mg total) by mouth every evening.    SOLIFENACIN (VESICARE) 5 MG TABLET    TAKE 1 TABLET BY MOUTH EVERY DAY    TIZANIDINE (ZANAFLEX) 2 MG TABLET    TAKE 1/2 TABLET BY MOUTH TWICE A DAY THEN 2 TABS AT BEDTIME AS NEEDED    TRAMADOL (ULTRAM) 50 MG TABLET    Take 50 mg by mouth 4 (four) times daily.   Changed and/or Refilled Medications    Modified Medication Previous Medication    ROSUVASTATIN (CRESTOR) 10 MG TABLET rosuvastatin (CRESTOR) 10 MG tablet       Take 1 tablet (10 mg total) by mouth every evening.    Take 1 tablet (10 mg total) by mouth every evening.   Discontinued Medications    FLUZONE QUAD 3013-2041, PF, 60 MCG (15 MCG X 4)/0.5 ML SYRG    TO BE ADMINISTERED BY PHARMACIST FOR IMMUNIZATION

## 2024-02-15 DIAGNOSIS — G35 MS (MULTIPLE SCLEROSIS): ICD-10-CM

## 2024-02-17 RX ORDER — GABAPENTIN 600 MG/1
TABLET ORAL
Qty: 150 TABLET | Refills: 5 | Status: SHIPPED | OUTPATIENT
Start: 2024-02-17

## 2024-02-20 ENCOUNTER — PATIENT MESSAGE (OUTPATIENT)
Dept: FAMILY MEDICINE | Facility: CLINIC | Age: 64
End: 2024-02-20
Payer: COMMERCIAL

## 2024-02-21 ENCOUNTER — LAB VISIT (OUTPATIENT)
Dept: LAB | Facility: HOSPITAL | Age: 64
End: 2024-02-21
Attending: FAMILY MEDICINE
Payer: COMMERCIAL

## 2024-02-21 ENCOUNTER — CLINICAL SUPPORT (OUTPATIENT)
Dept: FAMILY MEDICINE | Facility: CLINIC | Age: 64
End: 2024-02-21
Payer: COMMERCIAL

## 2024-02-21 VITALS — HEART RATE: 87 BPM | SYSTOLIC BLOOD PRESSURE: 128 MMHG | OXYGEN SATURATION: 98 % | DIASTOLIC BLOOD PRESSURE: 82 MMHG

## 2024-02-21 DIAGNOSIS — Z01.30 BLOOD PRESSURE CHECK: Primary | ICD-10-CM

## 2024-02-21 DIAGNOSIS — E78.2 MIXED HYPERLIPIDEMIA: ICD-10-CM

## 2024-02-21 DIAGNOSIS — E55.9 VITAMIN D INSUFFICIENCY: ICD-10-CM

## 2024-02-21 DIAGNOSIS — Z01.419 WELL WOMAN EXAM: ICD-10-CM

## 2024-02-21 LAB
25(OH)D3+25(OH)D2 SERPL-MCNC: 70 NG/ML (ref 30–96)
ALBUMIN SERPL BCP-MCNC: 3.8 G/DL (ref 3.5–5.2)
ALP SERPL-CCNC: 80 U/L (ref 55–135)
ALT SERPL W/O P-5'-P-CCNC: 14 U/L (ref 10–44)
ANION GAP SERPL CALC-SCNC: 10 MMOL/L (ref 8–16)
AST SERPL-CCNC: 19 U/L (ref 10–40)
BASOPHILS # BLD AUTO: 0.09 K/UL (ref 0–0.2)
BASOPHILS NFR BLD: 0.8 % (ref 0–1.9)
BILIRUB SERPL-MCNC: 0.5 MG/DL (ref 0.1–1)
BUN SERPL-MCNC: 14 MG/DL (ref 8–23)
CALCIUM SERPL-MCNC: 9.9 MG/DL (ref 8.7–10.5)
CHLORIDE SERPL-SCNC: 105 MMOL/L (ref 95–110)
CHOLEST SERPL-MCNC: 169 MG/DL (ref 120–199)
CHOLEST/HDLC SERPL: 2.8 {RATIO} (ref 2–5)
CO2 SERPL-SCNC: 28 MMOL/L (ref 23–29)
CREAT SERPL-MCNC: 0.9 MG/DL (ref 0.5–1.4)
DIFFERENTIAL METHOD BLD: ABNORMAL
EOSINOPHIL # BLD AUTO: 0.3 K/UL (ref 0–0.5)
EOSINOPHIL NFR BLD: 2.9 % (ref 0–8)
ERYTHROCYTE [DISTWIDTH] IN BLOOD BY AUTOMATED COUNT: 14.8 % (ref 11.5–14.5)
EST. GFR  (NO RACE VARIABLE): >60 ML/MIN/1.73 M^2
ESTIMATED AVG GLUCOSE: 105 MG/DL (ref 68–131)
GLUCOSE SERPL-MCNC: 104 MG/DL (ref 70–110)
HBA1C MFR BLD: 5.3 % (ref 4–5.6)
HCT VFR BLD AUTO: 42.3 % (ref 37–48.5)
HDLC SERPL-MCNC: 60 MG/DL (ref 40–75)
HDLC SERPL: 35.5 % (ref 20–50)
HGB BLD-MCNC: 13.8 G/DL (ref 12–16)
IMM GRANULOCYTES # BLD AUTO: 0.07 K/UL (ref 0–0.04)
IMM GRANULOCYTES NFR BLD AUTO: 0.6 % (ref 0–0.5)
LDLC SERPL CALC-MCNC: 88.4 MG/DL (ref 63–159)
LYMPHOCYTES # BLD AUTO: 1.7 K/UL (ref 1–4.8)
LYMPHOCYTES NFR BLD: 14.1 % (ref 18–48)
MCH RBC QN AUTO: 31.4 PG (ref 27–31)
MCHC RBC AUTO-ENTMCNC: 32.6 G/DL (ref 32–36)
MCV RBC AUTO: 96 FL (ref 82–98)
MONOCYTES # BLD AUTO: 0.7 K/UL (ref 0.3–1)
MONOCYTES NFR BLD: 6.2 % (ref 4–15)
NEUTROPHILS # BLD AUTO: 9 K/UL (ref 1.8–7.7)
NEUTROPHILS NFR BLD: 75.4 % (ref 38–73)
NONHDLC SERPL-MCNC: 109 MG/DL
NRBC BLD-RTO: 0 /100 WBC
PLATELET # BLD AUTO: 351 K/UL (ref 150–450)
PMV BLD AUTO: 10.9 FL (ref 9.2–12.9)
POTASSIUM SERPL-SCNC: 3.9 MMOL/L (ref 3.5–5.1)
PROT SERPL-MCNC: 7.3 G/DL (ref 6–8.4)
RBC # BLD AUTO: 4.4 M/UL (ref 4–5.4)
SODIUM SERPL-SCNC: 143 MMOL/L (ref 136–145)
TRIGL SERPL-MCNC: 103 MG/DL (ref 30–150)
TSH SERPL DL<=0.005 MIU/L-ACNC: 2.67 UIU/ML (ref 0.4–4)
WBC # BLD AUTO: 11.86 K/UL (ref 3.9–12.7)

## 2024-02-21 PROCEDURE — 83036 HEMOGLOBIN GLYCOSYLATED A1C: CPT | Performed by: FAMILY MEDICINE

## 2024-02-21 PROCEDURE — 80053 COMPREHEN METABOLIC PANEL: CPT | Performed by: FAMILY MEDICINE

## 2024-02-21 PROCEDURE — 84443 ASSAY THYROID STIM HORMONE: CPT | Performed by: FAMILY MEDICINE

## 2024-02-21 PROCEDURE — 85025 COMPLETE CBC W/AUTO DIFF WBC: CPT | Performed by: FAMILY MEDICINE

## 2024-02-21 PROCEDURE — 36415 COLL VENOUS BLD VENIPUNCTURE: CPT | Mod: PO | Performed by: FAMILY MEDICINE

## 2024-02-21 PROCEDURE — 99999 PR PBB SHADOW E&M-EST. PATIENT-LVL III: CPT | Mod: PBBFAC,,,

## 2024-02-21 PROCEDURE — 99499 UNLISTED E&M SERVICE: CPT | Mod: S$GLB,,, | Performed by: FAMILY MEDICINE

## 2024-02-21 PROCEDURE — 80061 LIPID PANEL: CPT | Performed by: FAMILY MEDICINE

## 2024-02-21 PROCEDURE — 82306 VITAMIN D 25 HYDROXY: CPT | Performed by: FAMILY MEDICINE

## 2024-02-21 NOTE — PROGRESS NOTES
Karen D Eleser 63 y.o. female is here today for Blood Pressure check.   History of HTN no.    Review of patient's allergies indicates:   Allergen Reactions    Ambien [zolpidem] Other (See Comments)     Hallucinations, illusions, sleepwalking/sleepeating    Levaquin  [levofloxacin]      Other reaction(s): Swelling  Other reaction(s): Hives    Sonata [zaleplon] Other (See Comments)     Visual hallucinations     Creatinine   Date Value Ref Range Status   03/22/2023 1.1 0.5 - 1.4 mg/dL Final     Sodium   Date Value Ref Range Status   03/22/2023 142 136 - 145 mmol/L Final     Potassium   Date Value Ref Range Status   03/22/2023 3.5 3.5 - 5.1 mmol/L Final   ]  Patient verifies taking blood pressure medications on a regular basis at the same time of the day.     Current Outpatient Medications:     ALPRAZolam (XANAX) 0.5 MG tablet, Take 1 tablet (0.5 mg total) by mouth 2 (two) times daily as needed for Anxiety., Disp: 60 tablet, Rfl: 3    buPROPion (WELLBUTRIN XL) 300 MG 24 hr tablet, Take 1 tablet (300 mg total) by mouth once daily., Disp: 90 tablet, Rfl: 2    cholecalciferol, vitamin D3, (VITAMIN D3) 5,000 unit Tab, Take 5,000 Units by mouth once daily. , Disp: , Rfl:     DENTA 5000 PLUS 1.1 % Crea, USE AS DIRECTED DAILY, Disp: , Rfl:     dextroamphetamine-amphetamine (ADDERALL) 20 mg tablet, Take 1 tablet by mouth 2 (two) times daily., Disp: 60 tablet, Rfl: 0    dextroamphetamine-amphetamine (ADDERALL) 20 mg tablet, Take 1 tablet by mouth 2 (two) times daily., Disp: 60 tablet, Rfl: 0    diclofenac sodium (VOLTAREN) 1 % Gel, Apply 2 g topically 4 (four) times daily., Disp: 5 Tube, Rfl: 0    DULoxetine (CYMBALTA) 60 MG capsule, Take 2 capsules (120 mg total) by mouth once daily., Disp: 60 capsule, Rfl: 6    gabapentin (NEURONTIN) 600 MG tablet, TAKE 2 TABLETS EVERY MORNING, 1 TABLET AT NOON AND 2 TABLETS AT BEDTIME, Disp: 150 tablet, Rfl: 5    glatiramer (COPAXONE, GLATOPA) 40 mg/mL injection, INJECT ONE SYRINGE  SUBCUTANEOUSLY 3 TIMES A WEEK AT LEAST 48 HOURS APART. ALLOW TO WARM TO ROOM TEMP FOR 20 MINUTES. REFRIGERATE., Disp: 12 each, Rfl: 1    hydrocortisone 2.5 % cream, Apply topically., Disp: , Rfl:     ketoconazole (NIZORAL) 2 % shampoo, Apply topically., Disp: , Rfl:     latanoprost 0.005 % ophthalmic solution, Place 1 drop into both eyes once daily., Disp: 7.5 mL, Rfl: 3    meloxicam (MOBIC) 15 MG tablet, TAKE 1 TABLET BY MOUTH EVERY DAY, Disp: 90 tablet, Rfl: 0    multivitamin (THERAGRAN) per tablet, Take by mouth. 1 Tablet Oral Every day, Disp: , Rfl:     PREVIDENT 5000 SENSITIVE 1.1-5 % Pste, Apply topically once daily. , Disp: , Rfl:     QUEtiapine (SEROQUEL) 25 MG Tab, TAKE 1 TO 2 TABLETS BY MOUTH EVERY DAY AT BEDTIME AS NEEDED SLEEP, Disp: 180 tablet, Rfl: 2    QUEtiapine (SEROQUEL) 50 MG tablet, Take 1 tablet (50 mg total) by mouth every evening., Disp: 90 tablet, Rfl: 2    rosuvastatin (CRESTOR) 10 MG tablet, Take 1 tablet (10 mg total) by mouth every evening., Disp: 90 tablet, Rfl: 3    solifenacin (VESICARE) 5 MG tablet, TAKE 1 TABLET BY MOUTH EVERY DAY, Disp: 90 tablet, Rfl: 3    tiZANidine (ZANAFLEX) 2 MG tablet, TAKE 1/2 TABLET BY MOUTH TWICE A DAY THEN 2 TABS AT BEDTIME AS NEEDED, Disp: 270 tablet, Rfl: 3    traMADol (ULTRAM) 50 mg tablet, Take 50 mg by mouth 4 (four) times daily., Disp: , Rfl: 1      BP: 128/82 , Pulse: 87 .

## 2024-02-22 DIAGNOSIS — H40.053 OCULAR HYPERTENSION, BILATERAL: ICD-10-CM

## 2024-02-22 RX ORDER — LATANOPROST 50 UG/ML
SOLUTION/ DROPS OPHTHALMIC
Qty: 7.5 ML | Refills: 4 | Status: SHIPPED | OUTPATIENT
Start: 2024-02-22

## 2024-02-22 RX ORDER — LATANOPROST 50 UG/ML
1 SOLUTION/ DROPS OPHTHALMIC DAILY
Qty: 7.5 ML | Refills: 3 | Status: SHIPPED | OUTPATIENT
Start: 2024-02-22 | End: 2024-05-22

## 2024-03-06 DIAGNOSIS — R25.2 SPASTICITY: ICD-10-CM

## 2024-03-06 DIAGNOSIS — G35 MULTIPLE SCLEROSIS: ICD-10-CM

## 2024-03-06 RX ORDER — TIZANIDINE 2 MG/1
TABLET ORAL
Qty: 270 TABLET | Refills: 3 | Status: SHIPPED | OUTPATIENT
Start: 2024-03-06

## 2024-03-09 DIAGNOSIS — R53.82 CHRONIC FATIGUE: ICD-10-CM

## 2024-03-09 DIAGNOSIS — R41.840 CONCENTRATION DEFICIT: ICD-10-CM

## 2024-03-09 DIAGNOSIS — G35 MULTIPLE SCLEROSIS: ICD-10-CM

## 2024-03-11 RX ORDER — DEXTROAMPHETAMINE SACCHARATE, AMPHETAMINE ASPARTATE, DEXTROAMPHETAMINE SULFATE AND AMPHETAMINE SULFATE 5; 5; 5; 5 MG/1; MG/1; MG/1; MG/1
1 TABLET ORAL 2 TIMES DAILY
Qty: 60 TABLET | Refills: 0 | Status: SHIPPED | OUTPATIENT
Start: 2024-03-11

## 2024-03-20 ENCOUNTER — TELEPHONE (OUTPATIENT)
Dept: PSYCHIATRY | Facility: CLINIC | Age: 64
End: 2024-03-20
Payer: COMMERCIAL

## 2024-03-20 NOTE — TELEPHONE ENCOUNTER
Received LTD paperwork via fax from [employer]. Called company to request paperwork via email as quality was poor.

## 2024-03-26 NOTE — TELEPHONE ENCOUNTER
Called Pt to clarify medical history questions. Pt also asked for assistance with pharmacy benefits through her insurance, SW provided her with customer service number. Told SW that she had made several refill requests and they had not yet been filled. VIRGINIE to inform team.

## 2024-04-02 ENCOUNTER — PATIENT MESSAGE (OUTPATIENT)
Dept: FAMILY MEDICINE | Facility: CLINIC | Age: 64
End: 2024-04-02
Payer: COMMERCIAL

## 2024-04-02 DIAGNOSIS — M51.36 DDD (DEGENERATIVE DISC DISEASE), LUMBAR: Primary | ICD-10-CM

## 2024-04-03 ENCOUNTER — HOSPITAL ENCOUNTER (OUTPATIENT)
Dept: RADIOLOGY | Facility: HOSPITAL | Age: 64
Discharge: HOME | End: 2024-04-03
Attending: FAMILY MEDICINE
Payer: COMMERCIAL

## 2024-04-03 DIAGNOSIS — Z12.31 ENCOUNTER FOR SCREENING MAMMOGRAM FOR MALIGNANT NEOPLASM OF BREAST: ICD-10-CM

## 2024-04-03 DIAGNOSIS — Z78.0 POSTMENOPAUSAL: ICD-10-CM

## 2024-04-03 DIAGNOSIS — M85.80 OSTEOPENIA, UNSPECIFIED LOCATION: ICD-10-CM

## 2024-04-03 PROCEDURE — 77080 DXA BONE DENSITY AXIAL: CPT | Mod: TC,PO

## 2024-04-03 PROCEDURE — 77067 SCR MAMMO BI INCL CAD: CPT | Mod: TC,PO

## 2024-04-03 PROCEDURE — 77067 SCR MAMMO BI INCL CAD: CPT | Mod: 26,,, | Performed by: RADIOLOGY

## 2024-04-03 PROCEDURE — 77080 DXA BONE DENSITY AXIAL: CPT | Mod: 26,,, | Performed by: RADIOLOGY

## 2024-04-03 PROCEDURE — 77063 BREAST TOMOSYNTHESIS BI: CPT | Mod: 26,,, | Performed by: RADIOLOGY

## 2024-04-03 NOTE — TELEPHONE ENCOUNTER
No care due was identified.  [facility] Embedded Care Due Messages. Reference number: [id number].   4/03/2024 11:14:17 AM CDT

## 2024-04-04 NOTE — TELEPHONE ENCOUNTER
Refill Routing Note   Medication(s) are not appropriate for processing by [facility] for the following reason(s):        Outside of protocol: Hi! Im having a problem with getting a prescription refilled. Its Meloxicam, 15 mg. It was originally ordered by Dr Don Rios in rheumatology. I havent seen him in quite a few years since he was on leave for cancer. I dont even know if he is still with [facility]. My pain management doctor, Dr Diogo Mills, took over the prescription, but he is no longer seeing pain management patients. I thought I had spoke with Dr Maia Minaya about taking it over, but she will no longer refill it & referred my to my PCP. Which is why Im now asking you (phew, long story!). Can you pleas refill this for me? I have to see so many doctors as it is & I would hate to have to go to Another doctor to get one prescription refilled. Thank you! [facility] action(s):  Route      Medication Therapy Plan:         Appointments  past 12m or future 3m with PCP    Date Provider   Last Visit   2/6/2024 Joseph Segura MD   Next Visit   Visit date not found Joseph Segura MD   ED visits in past 90 days: 0        Note composed:2:36 PM 04/04/2024

## 2024-04-07 RX ORDER — MELOXICAM 15 MG/1
15 TABLET ORAL DAILY
Qty: 90 TABLET | Refills: 3 | Status: SHIPPED | OUTPATIENT
Start: 2024-04-07

## 2024-04-25 DIAGNOSIS — F41.1 GENERALIZED ANXIETY DISORDER: ICD-10-CM

## 2024-04-26 RX ORDER — ALPRAZOLAM 0.5 MG/1
0.5 TABLET ORAL 2 TIMES DAILY PRN
Qty: 60 TABLET | Refills: 3 | Status: SHIPPED | OUTPATIENT
Start: 2024-04-26

## 2024-04-26 NOTE — TELEMEDICINE CONSULT
STAFF NOTE:  Pt was unable to connect for scheduled virtual appt at 4:30 pm.      Spoke with pt by phone.    Pt stated she has been doing OK overall.  She denied any significant problems to report.    She stated she just needed refills on some of her psychotropic meds.  She stated her current med regimen seems to be working well for her.    E-Rx were sent to pt's preferred pharmacy on file (see EMR).      Pt to reschedule for an appt with me in 3 - 6 months.      Madhav Tyler MD  Psychiatry Staff   [facility]

## 2024-05-02 ENCOUNTER — PATIENT MESSAGE (OUTPATIENT)
Dept: PSYCHIATRY | Facility: CLINIC | Age: 64
End: 2024-05-02
Payer: COMMERCIAL

## 2024-07-01 DIAGNOSIS — G89.4 CHRONIC PAIN SYNDROME: ICD-10-CM

## 2024-07-01 DIAGNOSIS — F33.1 MAJOR DEPRESSIVE DISORDER, RECURRENT EPISODE, MODERATE: ICD-10-CM

## 2024-07-03 RX ORDER — DULOXETIN HYDROCHLORIDE 60 MG/1
120 CAPSULE, DELAYED RELEASE ORAL DAILY
Qty: 60 CAPSULE | Refills: 6 | Status: SHIPPED | OUTPATIENT
Start: 2024-07-03

## 2024-07-25 ENCOUNTER — PATIENT MESSAGE (OUTPATIENT)
Dept: PSYCHIATRY | Facility: CLINIC | Age: 64
End: 2024-07-25
Payer: COMMERCIAL

## 2024-08-01 ENCOUNTER — OFFICE VISIT (OUTPATIENT)
Dept: PSYCHIATRY | Facility: CLINIC | Age: 64
End: 2024-08-01
Payer: COMMERCIAL

## 2024-08-01 DIAGNOSIS — R41.840 CONCENTRATION DEFICIT: ICD-10-CM

## 2024-08-01 DIAGNOSIS — F33.1 MAJOR DEPRESSIVE DISORDER, RECURRENT EPISODE, MODERATE: ICD-10-CM

## 2024-08-01 DIAGNOSIS — G35 MULTIPLE SCLEROSIS: ICD-10-CM

## 2024-08-01 DIAGNOSIS — G89.4 CHRONIC PAIN SYNDROME: ICD-10-CM

## 2024-08-01 DIAGNOSIS — F41.1 GAD (GENERALIZED ANXIETY DISORDER): ICD-10-CM

## 2024-08-01 DIAGNOSIS — R53.82 CHRONIC FATIGUE: ICD-10-CM

## 2024-08-01 DIAGNOSIS — F34.1 DYSTHYMIC DISORDER: ICD-10-CM

## 2024-08-01 DIAGNOSIS — F33.0 MDD (MAJOR DEPRESSIVE DISORDER), RECURRENT EPISODE, MILD: Primary | ICD-10-CM

## 2024-08-01 DIAGNOSIS — F51.04 CHRONIC INSOMNIA: ICD-10-CM

## 2024-08-01 DIAGNOSIS — F41.1 GENERALIZED ANXIETY DISORDER: ICD-10-CM

## 2024-08-01 PROCEDURE — 3044F HG A1C LEVEL LT 7.0%: CPT | Mod: CPTII,95,, | Performed by: PSYCHIATRY & NEUROLOGY

## 2024-08-01 PROCEDURE — 99213 OFFICE O/P EST LOW 20 MIN: CPT | Mod: 95,,, | Performed by: PSYCHIATRY & NEUROLOGY

## 2024-08-01 RX ORDER — DEXTROAMPHETAMINE SACCHARATE, AMPHETAMINE ASPARTATE, DEXTROAMPHETAMINE SULFATE AND AMPHETAMINE SULFATE 5; 5; 5; 5 MG/1; MG/1; MG/1; MG/1
1 TABLET ORAL 2 TIMES DAILY
Qty: 60 TABLET | Refills: 0 | Status: SHIPPED | OUTPATIENT
Start: 2024-09-30

## 2024-08-01 RX ORDER — DULOXETIN HYDROCHLORIDE 60 MG/1
120 CAPSULE, DELAYED RELEASE ORAL DAILY
Qty: 60 CAPSULE | Refills: 12 | Status: SHIPPED | OUTPATIENT
Start: 2024-08-01

## 2024-08-01 RX ORDER — ALPRAZOLAM 0.5 MG/1
0.5 TABLET ORAL 2 TIMES DAILY PRN
Qty: 60 TABLET | Refills: 3 | Status: SHIPPED | OUTPATIENT
Start: 2024-08-01

## 2024-08-01 RX ORDER — DEXTROAMPHETAMINE SACCHARATE, AMPHETAMINE ASPARTATE, DEXTROAMPHETAMINE SULFATE AND AMPHETAMINE SULFATE 5; 5; 5; 5 MG/1; MG/1; MG/1; MG/1
1 TABLET ORAL 2 TIMES DAILY
Qty: 60 TABLET | Refills: 0 | Status: SHIPPED | OUTPATIENT
Start: 2024-08-31

## 2024-08-01 RX ORDER — QUETIAPINE FUMARATE 50 MG/1
50 TABLET, FILM COATED ORAL NIGHTLY
Qty: 90 TABLET | Refills: 3 | Status: SHIPPED | OUTPATIENT
Start: 2024-08-01

## 2024-08-01 RX ORDER — DEXTROAMPHETAMINE SACCHARATE, AMPHETAMINE ASPARTATE, DEXTROAMPHETAMINE SULFATE AND AMPHETAMINE SULFATE 5; 5; 5; 5 MG/1; MG/1; MG/1; MG/1
1 TABLET ORAL 2 TIMES DAILY
Qty: 60 TABLET | Refills: 0 | Status: SHIPPED | OUTPATIENT
Start: 2024-08-01

## 2024-08-01 RX ORDER — BUPROPION HYDROCHLORIDE 300 MG/1
300 TABLET ORAL DAILY
Qty: 90 TABLET | Refills: 3 | Status: SHIPPED | OUTPATIENT
Start: 2024-08-01

## 2024-08-05 ENCOUNTER — PATIENT MESSAGE (OUTPATIENT)
Dept: PSYCHIATRY | Facility: CLINIC | Age: 64
End: 2024-08-05
Payer: COMMERCIAL

## 2024-09-05 DIAGNOSIS — G35 MULTIPLE SCLEROSIS: ICD-10-CM

## 2024-09-05 NOTE — TELEPHONE ENCOUNTER
----- Message from Manisha Ny sent at 9/5/2024  1:52 PM CDT -----  Refill request.    glatiramer (COPAXONE, GLATOPA) 40 mg/mL injection        [facility] - [address]  [address] [address]  Phone: [phone] Fax: [phone]    Confirmed patient's contact info below:  Contact Name: Karen Eleser  Phone Number: [phone]

## 2024-09-06 RX ORDER — GLATIRAMER 40 MG/ML
INJECTION, SOLUTION SUBCUTANEOUS
Qty: 12 EACH | Refills: 5 | Status: SHIPPED | OUTPATIENT
Start: 2024-09-06

## 2024-09-30 DIAGNOSIS — G35 MS (MULTIPLE SCLEROSIS): ICD-10-CM

## 2024-10-01 RX ORDER — GABAPENTIN 600 MG/1
TABLET ORAL
Qty: 450 TABLET | Refills: 1 | Status: SHIPPED | OUTPATIENT
Start: 2024-10-01

## 2024-11-06 ENCOUNTER — PATIENT MESSAGE (OUTPATIENT)
Dept: PSYCHIATRY | Facility: CLINIC | Age: 64
End: 2024-11-06
Payer: COMMERCIAL

## 2024-11-07 ENCOUNTER — TELEPHONE (OUTPATIENT)
Dept: NEUROLOGY | Facility: CLINIC | Age: 64
End: 2024-11-07
Payer: COMMERCIAL

## 2024-11-07 NOTE — TELEPHONE ENCOUNTER
Spoke with pt to advise of rescheduling her follow up appt to another day or virtual on Wednesday 11/13 due to those designated days being virtual's with Sue. Pt agreed to do a virtual. Advised pt we will schedule her MRI when Sue Put's new order in.

## 2024-11-13 ENCOUNTER — OFFICE VISIT (OUTPATIENT)
Dept: NEUROLOGY | Facility: CLINIC | Age: 64
End: 2024-11-13
Payer: COMMERCIAL

## 2024-11-13 DIAGNOSIS — G35 MULTIPLE SCLEROSIS: Primary | ICD-10-CM

## 2024-11-13 DIAGNOSIS — H40.059 OCULAR HYPERTENSION, UNSPECIFIED LATERALITY: ICD-10-CM

## 2024-11-13 PROCEDURE — 3044F HG A1C LEVEL LT 7.0%: CPT | Mod: CPTII,95,,

## 2024-11-13 PROCEDURE — 1159F MED LIST DOCD IN RCRD: CPT | Mod: CPTII,95,,

## 2024-11-13 PROCEDURE — 1160F RVW MEDS BY RX/DR IN RCRD: CPT | Mod: CPTII,95,,

## 2024-11-13 PROCEDURE — G2211 COMPLEX E/M VISIT ADD ON: HCPCS | Mod: 95,,,

## 2024-11-13 PROCEDURE — 99215 OFFICE O/P EST HI 40 MIN: CPT | Mod: 95,,,

## 2024-11-13 NOTE — PROGRESS NOTES
Patient ID: Karen D Eleser is a 64 y.o. female who presents today for a routine clinic visit for MS. She was last seen on 11/14/2023.  The history was provided by the patient.     The patient location is: at her home in [place], [place]  The chief complaint leading to consultation is: MS    Visit type: audiovisual    Face to Face time with patient: 46 minutes     Each patient to whom he or she provides medical services by telemedicine is:  (1) informed of the relationship between the physician and patient and the respective role of any other health care provider with respect to management of the patient; and (2) notified that he or she may decline to receive medical services by telemedicine and may withdraw from such care at any time.    Principle neurological diagnosis: MS   Date of symptom onset: 1980s  Date of diagnosis: 2005  Disease type at diagnosis: RR  Disease type currently: RR  Previous therapy: NA  Current therapy: Copaxone 2007 - present   Vitamin D Dose: 5000IU daily   Last MRI Brain: 10/18/2023 - stable   Last MRI C-spine: 5/15/2019 - no lesions  Last MRI T-spine: 5/15/2019 - no lesions   CSF: 7/17/2009 - 5 OCB  JCV status: NA  Other relevant labs and tests: 3/12/2020: NMO - negative, 2/2024: Vitamin D - 70     Subjective:     She states that over the last few months she feels her legs are heavy and felling more nerve pain.  This is not really a new symptom but she has not felt the heaviness in some time.  She states she has not be as active as she once was and she has also had some stress because she is seeing her family a little more often due to an upcoming wedding in the family.     She states that she does not have the same endurance she once had.  She walks just a little and she becomes fatigue    She was told by her pharmacist recently that there is a connection with Adderall and possibly increasing eye pressure and she does have ocular hypertension.  She has started limiting her use of Adderall.  She is due to see her optometrist, but she thinks he may have retired so she needs a new one.      Other than that everything has been stable.     ROS:      12/8/2020    10:43 AM   REVIEW OF SYMPTOMS   Do you feel abnormally tired on most days? Yes    Do you feel you generally sleep well? No    Do you have difficulty controlling your bladder?  Yes    Do you have difficulty controlling your bowels?  No    Do you have frequent muscle cramps, tightness or spasms in your limbs?  Yes    Do you have new visual symptoms?  No    Do you have worsening difficulty with your memory or thinking? Yes    Do you have worsening symptoms of anxiety or depression?  No    For patients who walk, Do you have more difficulty walking?  No    Have you fallen since your last visit?  Yes    For patients who use wheelchairs: Do you have any skin wounds or breakdown? Not Applicable    Do you have difficulty using your hands?  No    Do you have shooting or burning pain? Yes    Do you have difficulty with sexual function?  No    If you are sexually active, are you using birth control? Y/N  N/A Not Applicable    Do you often choke when swallowing liquids or solid food?  No    Do you experience worsening symptoms when overheated? Yes    Do you need any new equipment such as a wheelchair, walker or shower chair? No    Do you receive co-pay financial assistance for your principal MS medicine? Yes    Would you be interested in participating in an MS research trial in the future? Yes    For patients on Gilenya, Tecfidera, Aubagio, Rituxan, Ocrevus, Tysabri, Lemtrada or Methotrexate, are you aware that you should NOT receive live virus vaccines?  Not Applicable    Do you feel you have adequate family/friend support?  No    Do you have health insurance?   Yes    Are you currently employed? No    Do you receive SSDI/SSI?  No    Do you use marijuana or cannabis products? Yes    How often? Daily    Have you been diagnosed with a urinary tract infection  since your last visit here? No    Have you been diagnosed with a respiratory tract infection since your last visit here? No    Have you been to the emergency room since your last visit here? Yes    Have you been hospitalized since your last visit here?  Yes        Patient-reported            12/8/2020    10:49 AM   FSS SCORE & INTERPRETATION   FSS SCORE  60   FSS SCORE INTERPRETATION May be suffering from fatigue         12/8/2020    10:48 AM   MS JEAN-D SCORE & INTERPRETATION   JEAN-D SCORE  34   JEAN-D INTERPRETATION  Possibility of major Depression         12/8/2020    10:45 AM   MS LIZZETH-7 SCORE & INTERPRETATION   LIZZETH-7 SCORE  10   LIZZETH-7 SCORE INTERPRETATION Moderate Anxiety         12/8/2020    10:50 AM   PEQ MS MOS PAIN EFFECTS SCORE & INTERPRETATION   PES SCORE 27   PES SCORE INTERPRETATION Scores can range from 6-30.  Items are scaled so that higher scores indicate a greater impact of pain on a patients mood and behavior.          No data to display                  12/8/2020    10:51 AM   MS BLADDER CONTROL SCORE & INTERPRETATION   BLCS SCORE 1   BLCS SCORE INTERPRETATION  Scores can range from 0-22, with higher scores indicating greater bladder control problems.         12/8/2020    10:54 AM   MS BOWEL CONTROL SCORE & INTERPRETATION   BWCS SCORE 0   BWCS SCORE INTERPRETATION Scores can range from 0-26, with higher scores indicating greater bowel control problems.         12/8/2020    10:52 AM   PEQ MS IMPACT OF VISUAL IMPAIRMENT SCORE & INTERPRETATION   JACOB SCALE SCORE  5   JACOB SCORE INTERPRETATION Scores can range from 0-15, with higher scores indicating greater impact of visual problems on daily activites.         12/8/2020    10:54 AM   MS PDQ SCORE & INTERPRETATION   PDQ RETROSPECTIVE MEMORY SUBSCALE 15   PDQ ATTENTION/CONCENTRATION SUBSCALE 15   PDQ PROSPECTIVE MEMORY SUBSCALE 12   PDQ PLANNING/ORGANIZATION SUBSCALE 15   PDQ TOTAL SCORE 57   PDQ SCORE INTERPRETATION Scores can range from 0-80, with  higher scores indicating greater perceived cognitive impairment.         12/8/2020    10:57 AM   MSSS SCORE & INTERPRETATION   MSSS TANGIBLE SUPPORT SUBSCALE 0   MSSS EMOTIONAL/INFORMATIONAL SUPPORT SUBSCALE 21.88   MSSS AFFECTIONATE SUPPORT SUBSCALE 25   MSSS POSITIVE SOCIAL INTERACTION SUBSCALE 0   MSSS TOTAL SCORE 11.72   MSSS SCORE INTERPRETATION Scores can range from 0-100, with higher scores indicating greater perceived support.        SOCIAL HISTORY  Living arrangements - the patient lives alone.  Social History     Socioeconomic History    Marital status: Single   Occupational History     Employer: [employer] (MAIN OFFICE)   Tobacco Use    Smoking status: Never    Smokeless tobacco: Never   Substance and Sexual Activity    Alcohol use: Yes     Comment: rare    Drug use: No     Social Drivers of Health     Financial Resource Strain: Low Risk  (7/31/2024)    Overall Financial Resource Strain (CARDIA)     Difficulty of Paying Living Expenses: Not hard at all   Food Insecurity: No Food Insecurity (7/31/2024)    Hunger Vital Sign     Worried About Running Out of Food in the Last Year: Never true     Ran Out of Food in the Last Year: Never true   Physical Activity: Unknown (7/31/2024)    Exercise Vital Sign     Days of Exercise per Week: 0 days   Stress: No Stress Concern Present (7/31/2024)    [employer] [place] [employer] - Occupational Stress Questionnaire     Feeling of Stress : Only a little   Housing Stability: Unknown (7/31/2024)    Housing Stability Vital Sign     Unable to Pay for Housing in the Last Year: No       Current Outpatient Medications on File Prior to Visit   Medication Sig Dispense Refill    ALPRAZolam (XANAX) 0.5 MG tablet Take 1 tablet (0.5 mg total) by mouth 2 (two) times daily as needed for Anxiety. 60 tablet 3    buPROPion (WELLBUTRIN XL) 300 MG 24 hr tablet Take 1 tablet (300 mg total) by mouth once daily. 90 tablet 3    cholecalciferol, vitamin D3, (VITAMIN D3) 5,000 unit  Tab Take 5,000 Units by mouth once daily.       DENTA 5000 PLUS 1.1 % Crea USE AS DIRECTED DAILY      dextroamphetamine-amphetamine (ADDERALL) 20 mg tablet Take 1 tablet by mouth 2 (two) times daily. 60 tablet 0    dextroamphetamine-amphetamine (ADDERALL) 20 mg tablet Take 1 tablet by mouth 2 (two) times daily. 60 tablet 0    dextroamphetamine-amphetamine (ADDERALL) 20 mg tablet Take 1 tablet by mouth 2 (two) times daily. 60 tablet 0    diclofenac sodium (VOLTAREN) 1 % Gel Apply 2 g topically 4 (four) times daily. 5 Tube 0    DULoxetine (CYMBALTA) 60 MG capsule Take 2 capsules (120 mg total) by mouth once daily. 60 capsule 12    gabapentin (NEURONTIN) 600 MG tablet TAKE 2 TABLETS EVERY MORNING, 1 TABLET AT NOON AND 2 TABLETS AT BEDTIME 450 tablet 1    glatiramer (COPAXONE, GLATOPA) 40 mg/mL injection INJECT ONE SYRINGE SUBCUTANEOUSLY 3 TIMES A WEEK AT LEAST 48 HOURS APART. ALLOW TO WARM TO ROOM TEMP FOR 20 MINUTES. REFRIGERATE. 12 each 5    hydrocortisone 2.5 % cream Apply topically.      ketoconazole (NIZORAL) 2 % shampoo Apply topically.      latanoprost 0.005 % ophthalmic solution INSTILL 1 DROP INTO BOTH EYES IN THE EVENING 7.5 mL 4    meloxicam (MOBIC) 15 MG tablet Take 1 tablet (15 mg total) by mouth once daily. 90 tablet 3    multivitamin (THERAGRAN) per tablet Take by mouth. 1 Tablet Oral Every day      PREVIDENT 5000 SENSITIVE 1.1-5 % Pste Apply topically once daily.       QUEtiapine (SEROQUEL) 25 MG Tab TAKE 1 TO 2 TABLETS BY MOUTH EVERY DAY AT BEDTIME AS NEEDED SLEEP 180 tablet 2    QUEtiapine (SEROQUEL) 50 MG tablet Take 1 tablet (50 mg total) by mouth every evening. 90 tablet 3    rosuvastatin (CRESTOR) 10 MG tablet Take 1 tablet (10 mg total) by mouth every evening. 90 tablet 3    solifenacin (VESICARE) 5 MG tablet TAKE 1 TABLET BY MOUTH EVERY DAY 90 tablet 3    tiZANidine (ZANAFLEX) 2 MG tablet TAKE 1/2 TABLET BY MOUTH TWICE A DAY THEN 2 TABLETS AT BEDTIME AS NEEDED 270 tablet 3    traMADol (ULTRAM)  "50 mg tablet Take 50 mg by mouth 4 (four) times daily.  1     No current facility-administered medications on file prior to visit.       Objective:     1. 25 foot timed walk:      11/29/2022     9:37 AM 11/14/2023     2:05 PM   Timed 25 Foot Walk:   Did patient wear an AFO? No No   Was assistive device used? No No   Time for 25 Foot Walk (seconds) 5.8 5.62   Time for 25 Foot Walk (seconds)  5.9           NEURO EXAM    In general, the patient is well nourished and appears to be in no acute distress.    MENTAL STATUS: language is fluent, normal verbal comprehension, short-term and remote memory is intact, attention is normal, patient is alert and oriented x 3, fund of knowlege is appropriate by vocabulary.       Imaging:     Results for orders placed during the hospital encounter of 10/18/23    MRI Brain Demyelinating Without Contrast    Impression  1. There is a stable burden of white matter disease consistent with the provided diagnosis of multiple sclerosis without imaging findings to suggest interval progression of disease or active demyelination.  It should be noted that technique on the current study is different from the prior studies but given this consideration, the white matter lesions appears stable.  2. There is now complete opacification of the left maxillary sinus.  This has occurred since the prior studies.      Electronically signed by: Earle Soto MD  Date:    10/18/2023  Time:    14:25    No results found for this or any previous visit.    No results found for this or any previous visit.    No results found for this or any previous visit.    No results found for this or any previous visit.    No results found for this or any previous visit.        Labs:     Lab Results   Component Value Date    VZJYPQIY10JS 70 02/21/2024    JEUFJDAX67AP 78 03/22/2023    SNLVOXDF18IE 72 11/08/2021     No results found for: "JCVINDEX", "JCVANTIBODY"  No results found for: "NM2VMMZQ", "ABSOLUTECD3", "KU9SFJEH", " ""ABSOLUTECD8", "BZ0HPIUB", "ABSOLUTECD4", "LABCD48"  Lab Results   Component Value Date    WBC 11.86 02/21/2024    RBC 4.40 02/21/2024    HGB 13.8 02/21/2024    HCT 42.3 02/21/2024    MCV 96 02/21/2024    MCH 31.4 (H) 02/21/2024    MCHC 32.6 02/21/2024    RDW 14.8 (H) 02/21/2024     02/21/2024    MPV 10.9 02/21/2024    GRAN 9.0 (H) 02/21/2024    GRAN 75.4 (H) 02/21/2024    LYMPH 1.7 02/21/2024    LYMPH 14.1 (L) 02/21/2024    MONO 0.7 02/21/2024    MONO 6.2 02/21/2024    EOS 0.3 02/21/2024    BASO 0.09 02/21/2024    EOSINOPHIL 2.9 02/21/2024    BASOPHIL 0.8 02/21/2024     Sodium   Date Value Ref Range Status   02/21/2024 143 136 - 145 mmol/L Final     Potassium   Date Value Ref Range Status   02/21/2024 3.9 3.5 - 5.1 mmol/L Final     Chloride   Date Value Ref Range Status   02/21/2024 105 95 - 110 mmol/L Final     CO2   Date Value Ref Range Status   02/21/2024 28 23 - 29 mmol/L Final     Glucose   Date Value Ref Range Status   02/21/2024 104 70 - 110 mg/dL Final     BUN   Date Value Ref Range Status   02/21/2024 14 8 - 23 mg/dL Final     Creatinine   Date Value Ref Range Status   02/21/2024 0.9 0.5 - 1.4 mg/dL Final     Calcium   Date Value Ref Range Status   02/21/2024 9.9 8.7 - 10.5 mg/dL Final     Total Protein   Date Value Ref Range Status   02/21/2024 7.3 6.0 - 8.4 g/dL Final     Albumin   Date Value Ref Range Status   02/21/2024 3.8 3.5 - 5.2 g/dL Final     Total Bilirubin   Date Value Ref Range Status   02/21/2024 0.5 0.1 - 1.0 mg/dL Final     Comment:     For infants and newborns, interpretation of results should be based  on gestational age, weight and in agreement with clinical  observations.    Premature Infant recommended reference ranges:  Up to 24 hours.............<8.0 mg/dL  Up to 48 hours............<12.0 mg/dL  3-5 days..................<15.0 mg/dL  6-29 days.................<15.0 mg/dL       Alkaline Phosphatase   Date Value Ref Range Status   02/21/2024 80 55 - 135 U/L Final     AST   Date " "Value Ref Range Status   02/21/2024 19 10 - 40 U/L Final     ALT   Date Value Ref Range Status   02/21/2024 14 10 - 44 U/L Final     Anion Gap   Date Value Ref Range Status   02/21/2024 10 8 - 16 mmol/L Final     eGFR if    Date Value Ref Range Status   11/08/2021 >60.0 >60 mL/min/1.73 m^2 Final     eGFR if non    Date Value Ref Range Status   11/08/2021 >60.0 >60 mL/min/1.73 m^2 Final     Comment:     Calculation used to obtain the estimated glomerular filtration  rate (eGFR) is the CKD-EPI equation.        No results found for: "HEPBSAG", "HEPBSAB", "HEPBCAB"        MS Impression and Plan:     NEURO MULTIPLE SCLEROSIS IMPRESSION:   Number of relapses in the past year?:  0  MRI Progression:  Stable  MS Classification:  Relapsing-Remitting MS  Current DMT: glatiramer acetate  DMT:  No change in management  Additional Impressions: With patient's increase of symptoms, will obtain updated MRIs to assess disease stability. Increased symptoms may be the cause of deconditioning due to decrease activity.  Will refer to PT to help with strengthening/reconditioning.   Refer to optometry to get established with new provider.   GFAP and NfL soon  Follow up in 4 months with Dr. Minaya   Plan discussed and questions were answered to satisfaction.     Problem List Items Addressed This Visit       Multiple sclerosis - Primary    Relevant Orders    MRI Brain Demyelinating W W/O Contrast    MRI Cervical Spine Demyelinating W W/O Contrast    MRI Thoracic Spine Demyelinating W W/O Contrast    Ambulatory referral/consult to Physical/Occupational Therapy    GFAP, Plasma    Neurofilament Light Chain     Other Visit Diagnoses       Ocular hypertension, unspecified laterality        Relevant Orders    Ambulatory referral/consult to Optometry            I spent a total of 60 minutes on the day of the visit.This includes face to face time and non-face to face time preparing to see the patient (eg, review of " tests), obtaining and/or reviewing separately obtained history, documenting clinical information in the electronic or other health record, independently interpreting results and communicating results to the patient/family/caregiver, or care coordinator.       Sue See, PERLAP-C

## 2024-11-15 ENCOUNTER — OFFICE VISIT (OUTPATIENT)
Dept: OPTOMETRY | Facility: CLINIC | Age: 64
End: 2024-11-15
Payer: COMMERCIAL

## 2024-11-15 DIAGNOSIS — G35 MULTIPLE SCLEROSIS: ICD-10-CM

## 2024-11-15 DIAGNOSIS — Z98.890 HISTORY OF LASER REFRACTIVE SURGERY: ICD-10-CM

## 2024-11-15 DIAGNOSIS — H21.562 AFFERENT PUPILLARY DEFECT OF LEFT EYE: ICD-10-CM

## 2024-11-15 DIAGNOSIS — H25.13 NUCLEAR SCLEROSIS OF BOTH EYES: ICD-10-CM

## 2024-11-15 DIAGNOSIS — H40.053 OCULAR HYPERTENSION, BILATERAL: Primary | ICD-10-CM

## 2024-11-15 DIAGNOSIS — H40.059 OCULAR HYPERTENSION, UNSPECIFIED LATERALITY: ICD-10-CM

## 2024-11-15 DIAGNOSIS — H52.203 ASTIGMATISM OF BOTH EYES, UNSPECIFIED TYPE: ICD-10-CM

## 2024-11-15 PROCEDURE — 99999 PR PBB SHADOW E&M-EST. PATIENT-LVL IV: CPT | Mod: PBBFAC,,, | Performed by: OPTOMETRIST

## 2024-11-15 RX ORDER — LATANOPROST 50 UG/ML
1 SOLUTION/ DROPS OPHTHALMIC NIGHTLY
Qty: 7.5 ML | Refills: 4 | Status: SHIPPED | OUTPATIENT
Start: 2024-11-15 | End: 2025-11-15

## 2024-11-15 NOTE — PROGRESS NOTES
HPI    Referral - ocular health visit [facility] 09/11/23 (stephan)    Pt complains of blurred distance va w specs. Pt denies floaters, flashes  Gtts: latanoprost QHS OU - pt stated gtts ran out last week, pharmacy will   not refill until insurance covers meds.   Last edited by Ronna Rosas on 11/15/2024  3:58 PM.            Assessment /Plan     For exam results, see Encounter Report.    Ocular hypertension, bilateral  -     latanoprost 0.005 % ophthalmic solution; Place 1 drop into both eyes every evening.  Dispense: 7.5 mL; Refill: 4    Ocular hypertension, unspecified laterality  -     Ambulatory referral/consult to Optometry    Afferent pupillary defect of left eye    Multiple sclerosis    History of laser refractive surgery    Nuclear sclerosis of both eyes    Astigmatism of both eyes, unspecified type      1,2. IOP up, pt off drops, restart Latanaprost qhs OU, RTC for HVf(24-2)alvaro tsd and oct rnfl. IOP ck after  3,4. Pt due for OCT rnfl and iop ck also  5. S/P lasik ou  6. Educated pt on presence of cataracts and effects on vision. No surgery at this time. Recheck in one year.  7. New Spectacle Rx given, discussed different options for glasses. RTC 1 year routine eye exam.

## 2024-11-18 ENCOUNTER — PATIENT MESSAGE (OUTPATIENT)
Dept: OPHTHALMOLOGY | Facility: CLINIC | Age: 64
End: 2024-11-18
Payer: COMMERCIAL

## 2024-11-22 ENCOUNTER — TELEPHONE (OUTPATIENT)
Dept: NEUROLOGY | Facility: CLINIC | Age: 64
End: 2024-11-22
Payer: COMMERCIAL

## 2024-11-22 NOTE — TELEPHONE ENCOUNTER
Spoke with pt to schedule labs ,MRI's and follow up. Mri's and labs are scheduled for 12/18. Follow up is scheduled for 3/18.

## 2024-11-25 ENCOUNTER — CLINICAL SUPPORT (OUTPATIENT)
Dept: REHABILITATION | Facility: HOSPITAL | Age: 64
End: 2024-11-25
Payer: COMMERCIAL

## 2024-11-25 DIAGNOSIS — G35 MULTIPLE SCLEROSIS: ICD-10-CM

## 2024-11-25 DIAGNOSIS — Z74.09 IMPAIRED FUNCTIONAL MOBILITY AND ACTIVITY TOLERANCE: Primary | ICD-10-CM

## 2024-11-25 PROCEDURE — 97110 THERAPEUTIC EXERCISES: CPT | Mod: PN | Performed by: PHYSICAL THERAPIST

## 2024-11-25 PROCEDURE — 97161 PT EVAL LOW COMPLEX 20 MIN: CPT | Mod: PN | Performed by: PHYSICAL THERAPIST

## 2024-11-26 NOTE — PLAN OF CARE
[facility]   Physical Therapy Initial Evaluation     Date: 11/25/2024   Name: Karen D Eleser  Clinic Number: [id number]    Therapy Diagnosis:   Encounter Diagnoses   Name Primary?    Multiple sclerosis     Impaired functional mobility and activity tolerance Yes     Physician: Sue See NP    Physician Orders: PT Eval and Treat  Medical Diagnosis from Referral: Multiple sclerosis [G35]   Evaluation Date: 11/25/2024  Authorization Period Expiration: 11/13/2025  Plan of Care Expiration: 1/20/2025  Progress Note Due: 12/25/2024  Visit # / Visits authorized: 1/ 1   FOTO: 1/3    Precautions: Standard     Time In: 0302 pm  Time Out: 0350 pm  Total Appointment Time (timed & untimed codes): 48 minutes    SUBJECTIVE     Date of onset: last couple months    History of current condition - Krystal reports: 12/23/2005 she was diagnosed with MS, was having symptoms before that however. Condition is  stable but having more fatigue lately and heaviness in legs for the last few months. She has history of chronic lower back pain, has received injections and rhizotomies in the past. Had left knee scope years ago. Left side is more affected than right.   Falls: NA    Imaging, scheduled    Prior Therapy: once many years ago  Social History: lives alone, single story home  Occupation: NA  Prior Level of Function: ind  Current Level of Function: ind    Pain:  Current 4/10, worst 8/10, best 4/10   Location: multiple sites, global  Description: Aching, Dull, Burning, Tingling, and heaviness  Aggravating Factors: Standing and Walking  Easing Factors: rest and medication    Patients goals:help with mobility and ROM     Medical History:   Past Medical History:   Diagnosis Date    Arthritis     Chronic insomnia 4/14/2016    DDD (degenerative disc disease), lumbar 7/23/2013    Depression     Dysthymic disorder 3/30/2015    Fatigue     Fibromyalgia     LIZZETH (generalized anxiety disorder) 4/14/2016     Hyperlipidemia     Major depressive disorder, recurrent, moderate 4/14/2016    Multiple sclerosis     Torn meniscus      Surgical History:   Karen D Eleser  has a past surgical history that includes Joint replacement; Laparoscopic gastric banding; Belt abdominoplasty; Cholecystectomy; BREAST REDUCTION; Vaginal cuff revision; Hysterectomy (08/11/2008); and Total Reduction Mammoplasty.    Medications:   Krystal has a current medication list which includes the following prescription(s): alprazolam, bupropion, vitamin d3, denta 5000 plus, dextroamphetamine-amphetamine, dextroamphetamine-amphetamine, dextroamphetamine-amphetamine, diclofenac sodium, duloxetine, gabapentin, glatiramer, hydrocortisone, ketoconazole, latanoprost, meloxicam, multivitamin, prevident 5000 sensitive, quetiapine, quetiapine, rosuvastatin, solifenacin, tizanidine, and tramadol.    Allergies:   Review of patient's allergies indicates:   Allergen Reactions    Ambien [zolpidem] Other (See Comments)     Hallucinations, illusions, sleepwalking/sleepeating    Levaquin  [levofloxacin]      Other reaction(s): Swelling  Other reaction(s): Hives    Sonata [zaleplon] Other (See Comments)     Visual hallucinations      OBJECTIVE     CMS Impairment/Limitation/Restriction for FOTO NOC Survey    Therapist reviewed FOTO scores for Karen D Eleser on 11/25/2024.   FOTO documents entered into EPIC - see Media section.    Intake Score: 62%       Mental status: alert    Sensation: diminished at times in extremities    DTR:   Right Left Comment   Patellar (L3-4) 2+ 2+    Achilles (S1) 2+ 2+      Lower Extremity Strength  Right LE  Left LE    Hip Flexion: 4/5 Hip Flexion: 4/5   Hip Extension:  4+/5 Hip Extension: 4/5   Hip Abduction: 4+/5 Hip Abduction: 4/5   Hip Adduction: 4+/5 Hip Adduction 4/5   Knee Extension: 4+/5 Knee Extension: 4+/5   Knee Flexion: 4/5 Knee Flexion: 4/5   Ankle Dorsiflexion: 5/5 Ankle Dorsiflexion: 5/5   Ankle Plantarflexion: 5/5 Ankle  Plantarflexion: 5/5     Abdominal Strength: fair with increased LBP    Special Tests     Balance Assessment: difficulty with SLS 0-5 sec B     Evaluation   30 second Chair Rise  (adults > 61 y/o) 12 completed with arms   5 times sit-stand  (adults 18-65 y/o) 11 seconds  >12 sec= fall risk for general elderly  >16 sec= fall risk for Parkinson's disease  >10 sec= balance/vestibular dysfunction (<61 y/o)  >14.2 sec= balance/vestibular dysfunction (>61 y/o)  >12 sec= fall risk for CVA     GAIT DEVIATIONS: Krystal amb with decreased step length and decreased toe-to-floor clearance.    Endurance Assessment:   Evaluation   Timed Up and Go 8 sec  < 20 sec safe for independent transfers, < 30 sec safe for dependent transfers/assist required     Table: Population Norms for TUG    Age  Average TUG    60 - 69 years  8.1 seconds    70 - 79 years  9.2 seconds    80 - 99 years  11.3 seconds      Palpation:  None significant    TREATMENT     Total Treatment time (time-based codes) separate from Evaluation: (10) minutes      Krystal received the treatments listed below:      therapeutic exercises to develop strength, endurance, ROM, flexibility, posture, and core stabilization for (10) minutes including:  HEP review and patient education  See patient instructions for attached HEP    PATIENT EDUCATION AND HOME EXERCISES     Education provided:   - HEP provided and reviewed  - Anatomy and Physiology pertaining to current condition  - Possible response to exercise  - Importance of PT compliance    Written Home Exercises Provided: yes. Exercises were reviewed and Krystal was able to demonstrate them prior to the end of the session.  Krystal demonstrated good  understanding of the education provided. See EMR under Patient Instructions for exercises provided during therapy sessions.    ASSESSMENT     Krystal is a 64 y.o. female referred to outpatient Physical Therapy with a medical diagnosis of Multiple sclerosis [G35]. Patient presents with  weakness in BLE, decreased LE flexibility, decreased core strength, and decreased functional mobility/activity tolerance. Pt would benefit from continued skilled PT to address functional limitations.     Patient prognosis is Good.   Patient will benefit from skilled outpatient Physical Therapy to address the deficits stated above and in the chart below, provide patient /family education, and to maximize patientt's level of independence.     Plan of care discussed with patient: Yes  Patient's spiritual, cultural and educational needs considered and patient is agreeable to the plan of care and goals as stated below:     Anticipated Barriers for therapy: none    Medical Necessity is demonstrated by the following  History  Co-morbidities and personal factors that may impact the plan of care [] LOW: no personal factors / co-morbidities  [] MODERATE: 1-2 personal factors / co-morbidities  [x] HIGH: 3+ personal factors / co-morbidities    Moderate / High Support Documentation: PMH, co morbidities      Examination  Body Structures and Functions, activity limitations and participation restrictions that may impact the plan of care [x] LOW: addressing 1-2 elements  [] MODERATE: 3+ elements  [] HIGH: 4+ elements (please support below)    Moderate / High Support Documentation:      Clinical Presentation [] LOW: stable  [x] MODERATE: Evolving  [] HIGH: Unstable     Decision Making/ Complexity Score: low       Goals:  Short Term Goals: 4 weeks   1. Pt will be independent with HEP.   2. Pt will increase FOTO score by 5 points.   3. Pt will increased LE flexibility by at least 5-10 degrees.     Long Term Goals: 8 weeks   1. Pt will demonstrate BLE gross MMT to 5/5.   2. Pt will be able to demonstrate good core strength without reproducing symptoms and reduce onset of lower back pain.   3. Pt will be able to complete 30 min or more of exercise without significant fatigue, spasms, or neurological symptoms.     PLAN     Plan of care  Certification: 11/25/2024 to 1/20/2024.    Outpatient Physical Therapy 2 times weekly for 8 weeks to include the following interventions: Cervical/Lumbar Traction, Electrical Stimulation IFC/TENS, Gait Training, Manual Therapy, Moist Heat/ Ice, Neuromuscular Re-ed, Patient Education, Therapeutic Activities, Therapeutic Exercise, and DNT.     Joss Webster, PT DPT    I CERTIFY THE NEED FOR THESE SERVICES FURNISHED UNDER THIS PLAN OF TREATMENT AND WHILE UNDER MY CARE   Physician's comments:     Physician's Signature: ___________________________________________________

## 2024-12-03 DIAGNOSIS — G35 MS (MULTIPLE SCLEROSIS): Primary | ICD-10-CM

## 2024-12-12 DIAGNOSIS — G35 MS (MULTIPLE SCLEROSIS): ICD-10-CM

## 2024-12-12 DIAGNOSIS — N31.9 NEUROGENIC BLADDER: ICD-10-CM

## 2024-12-12 RX ORDER — SOLIFENACIN SUCCINATE 5 MG/1
5 TABLET, FILM COATED ORAL
Qty: 90 TABLET | Refills: 3 | Status: SHIPPED | OUTPATIENT
Start: 2024-12-12

## 2024-12-16 ENCOUNTER — PATIENT MESSAGE (OUTPATIENT)
Dept: PSYCHIATRY | Facility: CLINIC | Age: 64
End: 2024-12-16
Payer: COMMERCIAL

## 2024-12-19 ENCOUNTER — LAB VISIT (OUTPATIENT)
Dept: LAB | Facility: HOSPITAL | Age: 64
End: 2024-12-19
Payer: COMMERCIAL

## 2024-12-19 ENCOUNTER — OFFICE VISIT (OUTPATIENT)
Dept: PSYCHIATRY | Facility: CLINIC | Age: 64
End: 2024-12-19
Payer: COMMERCIAL

## 2024-12-19 VITALS
SYSTOLIC BLOOD PRESSURE: 139 MMHG | WEIGHT: 157.75 LBS | BODY MASS INDEX: 30.81 KG/M2 | HEART RATE: 87 BPM | DIASTOLIC BLOOD PRESSURE: 68 MMHG

## 2024-12-19 DIAGNOSIS — F51.04 CHRONIC INSOMNIA: ICD-10-CM

## 2024-12-19 DIAGNOSIS — F33.1 MAJOR DEPRESSIVE DISORDER, RECURRENT EPISODE, MODERATE: ICD-10-CM

## 2024-12-19 DIAGNOSIS — F41.1 GAD (GENERALIZED ANXIETY DISORDER): ICD-10-CM

## 2024-12-19 DIAGNOSIS — R53.82 CHRONIC FATIGUE: ICD-10-CM

## 2024-12-19 DIAGNOSIS — G35 MULTIPLE SCLEROSIS: ICD-10-CM

## 2024-12-19 DIAGNOSIS — R41.840 CONCENTRATION DEFICIT: ICD-10-CM

## 2024-12-19 DIAGNOSIS — F33.41 MDD (MAJOR DEPRESSIVE DISORDER), RECURRENT, IN PARTIAL REMISSION: Primary | ICD-10-CM

## 2024-12-19 DIAGNOSIS — R41.840 ATTENTION AND CONCENTRATION DEFICIT: ICD-10-CM

## 2024-12-19 DIAGNOSIS — F41.1 GENERALIZED ANXIETY DISORDER: ICD-10-CM

## 2024-12-19 DIAGNOSIS — F34.1 DYSTHYMIC DISORDER: ICD-10-CM

## 2024-12-19 DIAGNOSIS — G35 MS (MULTIPLE SCLEROSIS): ICD-10-CM

## 2024-12-19 LAB
CREAT SERPL-MCNC: 0.7 MG/DL (ref 0.5–1.4)
EST. GFR  (NO RACE VARIABLE): >60 ML/MIN/1.73 M^2

## 2024-12-19 PROCEDURE — 82565 ASSAY OF CREATININE: CPT

## 2024-12-19 PROCEDURE — 0361U NEURFLMNT LT CHN DIG IA QUAN: CPT

## 2024-12-19 PROCEDURE — 99214 OFFICE O/P EST MOD 30 MIN: CPT | Mod: S$GLB,,, | Performed by: PSYCHIATRY & NEUROLOGY

## 2024-12-19 PROCEDURE — 3075F SYST BP GE 130 - 139MM HG: CPT | Mod: CPTII,S$GLB,, | Performed by: PSYCHIATRY & NEUROLOGY

## 2024-12-19 PROCEDURE — 99999 PR PBB SHADOW E&M-EST. PATIENT-LVL II: CPT | Mod: PBBFAC,,, | Performed by: PSYCHIATRY & NEUROLOGY

## 2024-12-19 PROCEDURE — 3078F DIAST BP <80 MM HG: CPT | Mod: CPTII,S$GLB,, | Performed by: PSYCHIATRY & NEUROLOGY

## 2024-12-19 PROCEDURE — 1159F MED LIST DOCD IN RCRD: CPT | Mod: CPTII,S$GLB,, | Performed by: PSYCHIATRY & NEUROLOGY

## 2024-12-19 PROCEDURE — 3044F HG A1C LEVEL LT 7.0%: CPT | Mod: CPTII,S$GLB,, | Performed by: PSYCHIATRY & NEUROLOGY

## 2024-12-19 PROCEDURE — 1160F RVW MEDS BY RX/DR IN RCRD: CPT | Mod: CPTII,S$GLB,, | Performed by: PSYCHIATRY & NEUROLOGY

## 2024-12-19 PROCEDURE — 3008F BODY MASS INDEX DOCD: CPT | Mod: CPTII,S$GLB,, | Performed by: PSYCHIATRY & NEUROLOGY

## 2024-12-19 PROCEDURE — 83520 IMMUNOASSAY QUANT NOS NONAB: CPT

## 2024-12-19 RX ORDER — QUETIAPINE FUMARATE 25 MG/1
TABLET, FILM COATED ORAL
Qty: 180 TABLET | Refills: 3 | Status: SHIPPED | OUTPATIENT
Start: 2024-12-19

## 2024-12-19 RX ORDER — ALPRAZOLAM 0.5 MG/1
0.5 TABLET ORAL 2 TIMES DAILY PRN
Qty: 60 TABLET | Refills: 3 | Status: SHIPPED | OUTPATIENT
Start: 2024-12-19

## 2024-12-19 RX ORDER — DEXTROAMPHETAMINE SACCHARATE, AMPHETAMINE ASPARTATE, DEXTROAMPHETAMINE SULFATE AND AMPHETAMINE SULFATE 5; 5; 5; 5 MG/1; MG/1; MG/1; MG/1
1 TABLET ORAL 2 TIMES DAILY
Qty: 60 TABLET | Refills: 0 | Status: SHIPPED | OUTPATIENT
Start: 2024-12-19

## 2024-12-19 NOTE — PROGRESS NOTES
"Outpatient Psychiatry Follow-Up Visit (MD/NP)   12/19/2024    Session Length:  30 minutes (E&M level 4)     Clinical Status of Patient: Outpatient (Ambulatory)    Chief Complaint: Karen D Eleser is a 64 y.o. female who presents today for follow-up of chronic depression and anxiety.     Met with patient.     Interval History and Content of Current Session:  Interim Events/Subjective Report/Content of Current Session:  First f/u appt with me since 8/1/2024 (VIRTUAL).    Pt had seen Dr. Tash Clark in the past; last appointment was on 9/28/2015.      Plan from last session:  "Continue all current meds as noted above.   Three Rx for Adderall, each for a 30 day supply with no refills & with "Do not fill until" dates posted accordingly, were e-Rx to pt's preferred pharmacy on file."    She states she has been "OK", "doing the same".    She still has depression.    She seems lonely and bored -- lives and tends to stay by herself.    She has "more good days than bad".    A "bad day" is when she has no energy, motivation and does not get out of bed.    This lasts about a day.  Sometimes, it seems to correlate after she has days when she is very active.       Her nephew was  on Dec 7.  She attended the wedding and reception.    "It was about 70 people there".      She sees Dr. Minaya for MS and fibromyalgia.     She will start going to PT sessions.  She plans to attend session in [place].    She states the thinks the MS has affected her left side > right side.    She takes Copaxone injections.  She denies AE's.    She also takes gabapentin (receives from Dr. Minaya) for neuropathic pain.  "I always feel that numbness, but if I don't take it, it gets really bad".     She continues to have chronic painful neuropathy in her feet, legs due to lumbar disc herniation.       She has NOT been taking the Adderall daily.     It mainly helps with energy and focus.    She may average 10 - 15 tablets every 30 days.   She has " "been told by her eye doctor that she has glaucoma.   She is on eye drops.    The Adderall MAY make glaucoma worse.      She occasionally takes Xanax for anxiety, but not daily.    She states she can definitely tell when the Adderall is working and when it wears off.      She did go to the convention she normally attends once a year in [place].    It was not a good trip because the airline lost her luggage.    She ended up canceling the last part of her trip ([place]) and just coming home.    She did finally get her luggage back, but not until about a week after she had returned home.    She wants to go to [place] in March and [place] in September.      Current SIGECAPS:    Sleep -- has had problems lately falling asleep.  She takes 75 mg of Seroquel most nights to help fall asleep.    Interests -- low in general.  Still has low structure -- tends to stay at home, likes to watch TV or read.     Guilt -- for some past events with close family members.   Energy -- fluctuates; some days OK, some < normal, easily fatigued.       Concentration -- poor due to MS; has a hard time reading      Appetite -- "fine"   Psychomotor --  Somewhat decreased   Suicidal ideation -- denies       She continues to see her pain management specialist at [facility] on [address].    She has used medical marijuana in the past, but not recently.        I have encouraged pt to keep in touch with others (friends, relatives).      Past psych meds: Celexa, lexapro, and effexor in the past.    From previous sessions:  Her father had endocarditis when he was younger.  He then had to have a pacemaker, then dual pacemaker/defibrillator.  He also had 3 sisters (her aunts) who all had heart disease (I did not asked if they smoked).  He had 3 older brothers who have been healthy.  She denies heart disease on her mother's side.    Her sister  from a massive MI at age 55 yo.  However, she abused pills and OTC meds and had gone to " "rehab at [place] just months prior to her death.  She notes she and her sister were very close; they even lived together for a while.    Pt has denied ever having problems with her heart.  She stated she never had a stress test, but she has had "plenty of" EKG's.   --Pt had neuropsychological testing on 19 with Dr. Butt for interpretation.  It did not show anything significant besides some slowing of processing speed thought likely due to the MS.   She had been treated by Dr. Royce Pina for MS until his death in .  She has also been treated by other neurologists.  Dr. Maia Minaya is her neurologist currently.   --Regarding her mom and sister:  Mother passed: 2015. Sister passed: 2014. Sister had problems with drugs and alcohol, was a nurse and lost her nursing license.  She  from a massive MI while in the snf house after substance rehab.  She was close to them both.  She had enjoyed going to the movies, going out to eat, travel, etc.  However, she had always done these things with her sister, who  in 2014.  She has no one in her life that she is close to compared to her sister in the past.  She has a brother who is 6 years younger -- "he can be very abrasive, he talks real loud".  "He also has his life, his children".  She also notes he does not understand or appreciate her mental illness.   --Regarding past intimate relationships: She has had just one very serious relationship that lasted for 10 years.  She states the relationship ended because he lied to her & told her that he worked and stayed at his mother's house, caring for her.  However, he did not work, lived off his mother's SS, then was kicked out of the mom's trailer by his siblings after she , so he was homeless for a while.  She thinks he may be living with his ex-wife now.  She states this happened about 5 years ago.    Since then, she had met 2 guys on line ([employer]) -- went out with each of them a " few times, but nothing ever came of this.    She notes her mother was very controlling.  Pt also was shy growing up.  Pt denies ever being sexually molested.  She denies ever having problems with alcohol or drugs.  She is out on disability from work ([employer]) as a .    She is still getting a check through her work disability co.     PSYCHOTHERAPY ADD-ON +[id number]   30 (16-37*) minutes   Target symptoms: depression, anxiety, fatigue  Why chosen therapy is appropriate versus another modality: relevant to diagnosis, patient responds to this modality  Outcome monitoring methods: self-report  Therapeutic intervention type: supportive psychotherapy  Topics discussed/themes: stress related to medical comorbidities, life stage transitional issues, grief, social isolation, self care/nurturing.  The patient's response to the intervention is accepting.   The patient's progress toward treatment goals is fair.  Duration of intervention: 10 minutes      Review of Systems   PSYCHIATRIC: Pertinant items are noted in the narrative.  CONSTITUTIONAL: Some recent wt fluctuations.  Chronic fatigue.     MUSCULOSKELETAL:  Chronic generalized aches/pains.   NEUROLOGIC: Some occasional mild numbness and tingling in her arms.  + chronic nuropathic pain in her feet/legs.  No weakness, seizures, confusion, memory loss, tremor or other abnormal movements.  ENDOCRINE: No polydipsia or polyuria.  INTEGUMENTARY: No rashes or lacerations.  EYES: No exophthalmos, jaundice or blindness.  ENT: No dizziness, tinnitus or hearing loss.  RESPIRATORY: No shortness of breath.  CARDIOVASCULAR: No tachycardia or chest pain.  GASTROINTESTINAL: No nausea, vomiting, pain, constipation or diarrhea.  GENITOURINARY: No frequency, dysuria.      Past Medical, Family and Social History: The patient's past medical, family and social history have been reviewed and updated as appropriate within the electronic medical record -- see encounter notes.        Current Outpatient Medications:     ALPRAZolam (XANAX) 0.5 MG tablet, Take 1 tablet (0.5 mg total) by mouth 2 (two) times daily as needed for Anxiety., Disp: 60 tablet, Rfl: 3    buPROPion (WELLBUTRIN XL) 300 MG 24 hr tablet, Take 1 tablet (300 mg total) by mouth once daily., Disp: 90 tablet, Rfl: 3    cholecalciferol, vitamin D3, (VITAMIN D3) 5,000 unit Tab, Take 5,000 Units by mouth once daily. , Disp: , Rfl:     DENTA 5000 PLUS 1.1 % Crea, USE AS DIRECTED DAILY, Disp: , Rfl:     dextroamphetamine-amphetamine (ADDERALL) 20 mg tablet, Take 1 tablet by mouth 2 (two) times daily., Disp: 60 tablet, Rfl: 0    dextroamphetamine-amphetamine (ADDERALL) 20 mg tablet, Take 1 tablet by mouth 2 (two) times daily., Disp: 60 tablet, Rfl: 0    dextroamphetamine-amphetamine (ADDERALL) 20 mg tablet, Take 1 tablet by mouth 2 (two) times daily., Disp: 60 tablet, Rfl: 0    diclofenac sodium (VOLTAREN) 1 % Gel, Apply 2 g topically 4 (four) times daily., Disp: 5 Tube, Rfl: 0    DULoxetine (CYMBALTA) 60 MG capsule, Take 2 capsules (120 mg total) by mouth once daily., Disp: 60 capsule, Rfl: 12    gabapentin (NEURONTIN) 600 MG tablet, TAKE 2 TABLETS EVERY MORNING, 1 TABLET AT NOON AND 2 TABLETS AT BEDTIME, Disp: 450 tablet, Rfl: 1    glatiramer (COPAXONE, GLATOPA) 40 mg/mL injection, INJECT ONE SYRINGE SUBCUTANEOUSLY 3 TIMES A WEEK AT LEAST 48 HOURS APART. ALLOW TO WARM TO ROOM TEMP FOR 20 MINUTES. REFRIGERATE., Disp: 12 each, Rfl: 5    hydrocortisone 2.5 % cream, Apply topically., Disp: , Rfl:     ketoconazole (NIZORAL) 2 % shampoo, Apply topically., Disp: , Rfl:     latanoprost 0.005 % ophthalmic solution, Place 1 drop into both eyes every evening., Disp: 7.5 mL, Rfl: 4    meloxicam (MOBIC) 15 MG tablet, Take 1 tablet (15 mg total) by mouth once daily., Disp: 90 tablet, Rfl: 3    multivitamin (THERAGRAN) per tablet, Take by mouth. 1 Tablet Oral Every day, Disp: , Rfl:     PREVIDENT 5000 SENSITIVE 1.1-5 % Pste, Apply topically once  "daily. , Disp: , Rfl:     QUEtiapine (SEROQUEL) 25 MG Tab, TAKE 1 TO 2 TABLETS BY MOUTH EVERY DAY AT BEDTIME AS NEEDED SLEEP, Disp: 180 tablet, Rfl: 2    QUEtiapine (SEROQUEL) 50 MG tablet, Take 1 tablet (50 mg total) by mouth every evening., Disp: 90 tablet, Rfl: 3    rosuvastatin (CRESTOR) 10 MG tablet, Take 1 tablet (10 mg total) by mouth every evening., Disp: 90 tablet, Rfl: 3    solifenacin (VESICARE) 5 MG tablet, TAKE 1 TABLET BY MOUTH EVERY DAY, Disp: 90 tablet, Rfl: 3    tiZANidine (ZANAFLEX) 2 MG tablet, TAKE 1/2 TABLET BY MOUTH TWICE A DAY THEN 2 TABLETS AT BEDTIME AS NEEDED, Disp: 270 tablet, Rfl: 3    traMADol (ULTRAM) 50 mg tablet, Take 50 mg by mouth 4 (four) times daily., Disp: , Rfl: 1    She occasionally takes Xanax (NOT DAILY) -- takes occasional one tab in day for anxiety, or may take one at night to help her relax and fall asleep.  She takes the Adderall occasionally as well.   She also takes tizanidine 2 tabs nightly for sleep and chronic pain.         Gabapentin is for peripheral neuropathy in her legs/feet.   She denies taking any Tramadol lately.   (I checked an [facility] pharmacy query.  Her reported use of the Xanax and Adderall is currently consistent with this query).      In the past, she had stated she took medical marijuana that is "prescribed" by her pain management doctor and dispensed by a pharmacy in [place].  It is a tincture in a bottle with a dropper.  It is labelled "" 1 ml dropper that she can take up to TID (takes for pain).  She states it helps with alleviate the pain; she estimates it provides about 50% pain relief.    She takes the THC in conjunction with oral CBD oil -- "(the CBD oil) gives that extra boost".    With this combination, she has needed to use less Tramadol or Norco.    Compliance: yes    Side effects: Ambien -- visual hallucinations and illusions, sleepwalking/sleepeating.     Risk Parameters:  Patient reports no suicidal ideation  Patient reports no " "homicidal ideation  Patient reports no self-injurious behavior  Patient reports no violent behavior    Exam (detailed: at least 9 elements; comprehensive: all 15 elements)    Constitutional  Vitals:  Most recent vital signs, dated less than 90 days prior to this appointment, were reviewed:     Vitals:    12/19/24 1359   BP: 139/68   Pulse: 87   Weight: 71.6 kg (157 lb 11.8 oz)          My Vitals       Weight Blood Pressure BMI Heartrate   11/29/2022 175 lb 6 oz  127/81  34.3  81    3/16/2023 167 lb 8.8 oz  124/82   98    3/22/2023 167 lb   32.6     11/14/2023 170 lb 4.9 oz  157/110 (H)      2/6/2024 168 lb 10.4 oz  138/102 (H)  32.9  100      138/108 (H)      2/21/2024  128/82   87    12/19/2024 157 lb 11.8 oz  139/68   87       Legend:  (H) High     General:  unremarkable, age appropriate, well dressed, neatly groomed     Musculoskeletal  Muscle Strength/Tone:  Normal   Gait & Station:  Normal gait     Psychiatric  Speech:  normal tone, normal rate, normal pitch, normal volume, spontaneous    Mood & Affect:  "OK"  Euthymic, restricted, appropriate   Thought Process:  normal and logical, mildly circumstantial   Associations:  intact    Thought Content:  normal, no suicidality, no homicidality, delusions, or paranoia    Insight & Judgement:  good, intact  adequate to circumstances, age appropriate, good    Orientation:  grossly intact, person, place, time/date, situation    Memory:  intact for content of interview, grossly intact    Language:  grossly intact    Attention Span & Concentration:  able to focus    Fund of Knowledge:  intact and appropriate to age and level of education, familiar with aspects of current personal life        AIMS Screening:    Facial and Oral Movements  Muscles of Facial Expression: None, normal  Lips and Perioral Area: Minimal (with alternating finger to thumb tapping)  Jaw: None, normal  Tongue: Mild    Extremity Movements  Upper (arms, wrists, hands, fingers): Mild (cogwheel " "rigidity/stiffness, L  > R)  Lower (legs, knees, ankles, toes): None, normal    Trunk Movements  Neck, shoulders, hips: None, normal    Overall Severity  Severity of abnormal movements (highest score from questions above): 2  Incapacitation due to abnormal movements: None, normal  Patient's awareness of abnormal movements (rate only patient's report): (not recorded)    Dental Status  Current problems with teeth and/or dentures?: No  Does patient usually wear dentures?: No        Prior EKG:  Test Reason : Z79.899  Vent. Rate : 077 BPM     Atrial Rate : 077 BPM     P-R Int : 136 ms          QRS Dur : 090 ms      QT Int : 406 ms       P-R-T Axes : 033 000 073 degrees     QTc Int : 459 ms  Normal sinus rhythm  Poor R wave progression  Abnormal ECG  When compared with ECG of 21-JUL-2008 08:45,  Nonspecific T wave abnormality no longer evident in Inferior leads  QT has lengthened  Confirmed by Marty VEE, Yaneli ([id number]) on 8/11/2017 4:36:19 PM      Assessment and Diagnosis    Status/Progress: Based on the examination today, the patient's problem(s) is/are adequately, but not ideally controlled. New problems have not been presented today. Comorbidities (chronic pain) are complicating management of the primary condition. There are no active rule-out diagnoses for this patient at this time.    General Impression:   Major Depressive Disorder, Recurrent: in partial remission  Dysthymic Disorder   Generalized Anxiety Disorder  Chronic Insomnia   Attention and concentration deficit  Multiple Sclerosis (NOT CODED)   Also: chronic pain, CHELSEA    Intervention/Counseling/Treatment Plan    Current medication management:  Continue all current meds as noted above.   One Rx for Adderall for a 30 day supply with no refills & with "Do not fill until" date posted accordingly was e-Rx to pt's preferred pharmacy on file.      Additional Notes:  Encouraged individual psychotherapy for support. Patient had been meeting with LCSW in Neurology " clinic; she can continue this (if insurance allows) or consider f/u with Dr. Nery Toledo or other therapist in our clinic.     Return to Clinic: ~ 3 - 4 months, or sooner prn.       Madhav Tyler MD

## 2024-12-21 PROBLEM — R41.840 ATTENTION AND CONCENTRATION DEFICIT: Status: ACTIVE | Noted: 2024-12-21

## 2024-12-23 LAB — NEUROFILAMENT LIGHT CHAIN, PLASMA: 9.2 PG/ML

## 2024-12-24 LAB — GFAP, PLASMA: 35.3 PG/ML (ref 0–186)

## 2024-12-27 ENCOUNTER — PATIENT MESSAGE (OUTPATIENT)
Dept: OPTOMETRY | Facility: CLINIC | Age: 64
End: 2024-12-27
Payer: COMMERCIAL

## 2025-01-08 NOTE — ED NOTES
History & Physical    SUBJECTIVE:     History of Present Illness:  Patient is a 31 y.o. female presents for pre-op visit for laparoscopic bilateral salpingectomy for permanent sterilization.  Pt took depo provera 24 for contraception bridge until salpingectomy can be performed.  Developed breakthrough bleeding and had psych effects on it.  No longer bleeding.  Desires permanent sterilization  Last pap 2022: neg     Past Medical History:   Diagnosis Date    Asthma 2022    Gestational diabetes mellitus (GDM) in third trimester controlled on oral hypoglycemic drug 2022    History of hypertension 2022-add PIH baseline labs to workup PCR 0.09 advised daily baby aspirin at 16 weeks    Hypertension     Thyroid disease      Past Surgical History:   Procedure Laterality Date    ADENOIDECTOMY       SECTION N/A 2022    Procedure:  SECTION;  Surgeon: Jhoana Alexander MD;  Location: [facility] L&D;  Service: OB/GYN;  Laterality: N/A;     SECTION       SECTION N/A 8/15/2024    Procedure:  SECTION REPEAT;  Surgeon: Sugar Sal MD;  Location: [facility] L&D;  Service: OB/GYN;  Laterality: N/A;    TONSILLECTOMY      age of 7 years old     Social History     Socioeconomic History    Marital status: Single   Tobacco Use    Smoking status: Former     Types: Cigarettes    Smokeless tobacco: Never   Substance and Sexual Activity    Alcohol use: Not Currently    Drug use: Never    Sexual activity: Yes     Partners: Male     Birth control/protection: None     Social Drivers of Health     Financial Resource Strain: Low Risk  (2024)    [facility]    Overall Financial Resource Strain (CARDIA)     Difficulty of Paying Living Expenses: Not hard at all   Food Insecurity: No Food Insecurity (2024)    Received from [facility] and Its  md at bedside for reassessment and to discuss x-ray results and plan of care.    [facility] and [facility]iliates    Hunger Vital Sign     Worried About Running Out of Food in the Last Year: Never true     Ran Out of Food in the Last Year: Never true   Transportation Needs: No Transportation Needs (12/6/2024)    [facility]    PRAPARE - Transportation     Lack of Transportation (Medical): No     Lack of Transportation (Non-Medical): No   Physical Activity: Patient Declined (12/6/2024)    Received from [facility] Its [facility]    Exercise Vital Sign     Days of Exercise per Week: Patient declined     Minutes of Exercise per Session: Patient declined   Stress: Stress Concern Present (12/6/2024)    Received from [facility] Its [facility]    [employer] - Occupational Stress Questionnaire     Feeling of Stress : To some extent   Housing Stability: Unknown (12/6/2024)    Received from [facility]    Housing Stability Vital Sign     Unable to Pay for Housing in the Last Year: No     Homeless in the Last Year: No     Family History   Problem Relation Name Age of Onset    Hypertension Paternal Grandmother      Diabetes Maternal Grandfather      Hypertension Father      Diabetes Mother      Breast cancer Neg Hx      Colon cancer Neg Hx      Ovarian cancer Neg Hx      Thrombosis Neg Hx       Review of patient's allergies indicates:  No Known Allergies  Current Outpatient Medications   Medication Sig Dispense Refill    busPIRone (BUSPAR) 5 MG Tab Take 5 mg by mouth.      labetaloL (NORMODYNE) 200 MG tablet Take 1 tablet (200 mg total) by mouth 3 (three) times daily. 90 tablet 11    levothyroxine (SYNTHROID) 100 MCG tablet Take 1 tablet (100 mcg total) by mouth before breakfast. 30 tablet 4     Current  Facility-Administered Medications   Medication Dose Route Frequency Provider Last Rate Last Admin    medroxyPROGESTERone (DEPO-PROVERA) syringe 150 mg  150 mg Intramuscular Q90 Days    150 mg at 09/30/24 1201            Review of Systems:  Constitutional: no fever or chills  Respiratory: no cough or shortness of breath  Cardiovascular: no chest pain or palpitations  Gastrointestinal: no nausea or vomiting, tolerating diet  Genitourinary: see HPI  Hematologic/Lymphatic: no easy bruising or lymphadenopathy  Neurological: no seizures or tremors      OBJECTIVE:     Vitals:    01/08/25 1444   BP: 128/72         Physical Exam:  General: well developed, well nourished, no distress  Head: normocephalic  Neck: supple, symmetrical, trachea midline  Lungs:  clear to auscultation bilaterally and normal respiratory effort  Chest Wall: no tenderness  Heart: regular rate and rhythm, S1, S2 normal, no murmur, rub or gallop  Abdomen: soft, non-tender non-distented; bowel sounds normal; no masses,  no organomegaly  Extremities: no cyanosis or edema, or clubbing  Pulses: 2+ and symmetric      Laboratory  Pre-op labs pending          ASSESSMENT/PLAN:     Kathy was seen today for pre-op exam.    Diagnoses and all orders for this visit:    Request for sterilization      The risks, benefits, and alternatives of the procedure were reviewed with the patient.  Surgical consents were reviewed in detail and were signed.  All questions were answered. Proceed with laparoscopic bilateral salpingectomy as scheduled.

## 2025-02-05 DIAGNOSIS — G35 MS (MULTIPLE SCLEROSIS): Primary | ICD-10-CM

## 2025-03-07 ENCOUNTER — PATIENT MESSAGE (OUTPATIENT)
Dept: PSYCHIATRY | Facility: CLINIC | Age: 65
End: 2025-03-07
Payer: COMMERCIAL

## 2025-03-07 DIAGNOSIS — E78.2 MIXED HYPERLIPIDEMIA: ICD-10-CM

## 2025-03-07 RX ORDER — ROSUVASTATIN CALCIUM 10 MG/1
10 TABLET, COATED ORAL NIGHTLY
Qty: 90 TABLET | Refills: 0 | OUTPATIENT
Start: 2025-03-07

## 2025-03-07 NOTE — TELEPHONE ENCOUNTER
Care Due:                  Date            Visit Type   Department     Provider  --------------------------------------------------------------------------------                                EP -                              PRIMARY      [facility]  Last Visit: 02-      CARE ([facility])   MEDICINE       Joseph Segura  Next Visit: None Scheduled  None         None Found                                                            Last  Test          Frequency    Reason                     Performed    Due Date  --------------------------------------------------------------------------------    Office Visit  12 months..  meloxicam, rosuvastatin..  02- 01-    CBC.........  12 months..  meloxicam................  02- 02-    CMP.........  12 months..  meloxicam, rosuvastatin..  02- 02-    Lipid Panel.  12 months..  rosuvastatin.............  02- 02-    [facility] Embedded Care Due Messages. Reference number: [id number].   3/07/2025 12:09:22 AM CST

## 2025-03-11 ENCOUNTER — PATIENT MESSAGE (OUTPATIENT)
Dept: NEUROLOGY | Facility: CLINIC | Age: 65
End: 2025-03-11
Payer: COMMERCIAL

## 2025-03-13 DIAGNOSIS — R25.2 SPASTICITY: ICD-10-CM

## 2025-03-13 DIAGNOSIS — G35 MULTIPLE SCLEROSIS: ICD-10-CM

## 2025-03-13 RX ORDER — TIZANIDINE 2 MG/1
TABLET ORAL
Qty: 270 TABLET | Refills: 3 | Status: SHIPPED | OUTPATIENT
Start: 2025-03-13

## 2025-03-14 ENCOUNTER — TELEPHONE (OUTPATIENT)
Dept: NEUROLOGY | Facility: CLINIC | Age: 65
End: 2025-03-14
Payer: COMMERCIAL

## 2025-03-14 ENCOUNTER — PATIENT MESSAGE (OUTPATIENT)
Dept: NEUROLOGY | Facility: CLINIC | Age: 65
End: 2025-03-14
Payer: COMMERCIAL

## 2025-03-21 ENCOUNTER — OFFICE VISIT (OUTPATIENT)
Dept: FAMILY MEDICINE | Facility: CLINIC | Age: 65
End: 2025-03-21
Payer: COMMERCIAL

## 2025-03-21 VITALS
TEMPERATURE: 98 F | SYSTOLIC BLOOD PRESSURE: 132 MMHG | DIASTOLIC BLOOD PRESSURE: 68 MMHG | HEART RATE: 93 BPM | BODY MASS INDEX: 31.86 KG/M2 | HEIGHT: 60 IN | WEIGHT: 162.25 LBS | OXYGEN SATURATION: 96 %

## 2025-03-21 DIAGNOSIS — Z23 NEED FOR VACCINATION: ICD-10-CM

## 2025-03-21 DIAGNOSIS — Z00.00 ANNUAL PHYSICAL EXAM: Primary | ICD-10-CM

## 2025-03-21 DIAGNOSIS — F41.1 GAD (GENERALIZED ANXIETY DISORDER): ICD-10-CM

## 2025-03-21 DIAGNOSIS — F33.1 MAJOR DEPRESSIVE DISORDER, RECURRENT EPISODE, MODERATE: ICD-10-CM

## 2025-03-21 DIAGNOSIS — G35 MULTIPLE SCLEROSIS: ICD-10-CM

## 2025-03-21 DIAGNOSIS — Z12.31 ENCOUNTER FOR SCREENING MAMMOGRAM FOR BREAST CANCER: ICD-10-CM

## 2025-03-21 DIAGNOSIS — E78.00 HYPERCHOLESTEREMIA: ICD-10-CM

## 2025-03-21 PROBLEM — F33.0 MDD (MAJOR DEPRESSIVE DISORDER), RECURRENT EPISODE, MILD: Status: ACTIVE | Noted: 2025-03-21

## 2025-03-21 PROCEDURE — 99999 PR PBB SHADOW E&M-EST. PATIENT-LVL V: CPT | Mod: PBBFAC,,, | Performed by: FAMILY MEDICINE

## 2025-03-21 RX ORDER — ROSUVASTATIN CALCIUM 10 MG/1
10 TABLET, COATED ORAL NIGHTLY
Qty: 90 TABLET | Refills: 3 | Status: SHIPPED | OUTPATIENT
Start: 2025-03-21

## 2025-03-21 NOTE — PROGRESS NOTES
Subjective:       Patient ID: Karen D Eleser is a 64 y.o. female.    Chief Complaint: Annual Exam    Patient here today for annual  exam.   Immunizations: Due Shingrix, RSV, and Prevnar   Last Lab work: 2024  Colon Ca screening: Colonoscopy 2021  Breast Ca Screening: MMG 4/2024  Cervical Ca Screening: s/p Hyst     MS: She is on long term disability for MS and she is followed by Dr. Shanks (neuro).  She is on Copaxone.  She has been seeing Pain Mgt (Marlene) who has her on Tramadol, Mobic, and Gabapentin.  HLD:  She is on crestor.  Lipids checked in Feb 2024  MDD/LIZZETH:  She is seeing Psych ([facility]).  She is currently on Wellbutrin, Cymbalta, Seroquel, Xanax and Adderall.  Mood is okay.     OAB:  On Vesicare.  Works well.        Review of Systems   Constitutional:  Negative for activity change, appetite change, fatigue and fever.   Respiratory:  Negative for cough, shortness of breath and wheezing.    Cardiovascular:  Negative for chest pain and palpitations.   Gastrointestinal:  Negative for abdominal pain, constipation, diarrhea and nausea.   Skin:  Negative for pallor and rash.   Neurological:  Negative for dizziness, syncope, light-headedness and headaches.       Objective:      Vitals:    03/21/25 1452   BP: 132/68   Pulse: 93   Temp: 98.3 °F (36.8 °C)   SpO2: 96%   Weight: 73.6 kg (162 lb 4.1 oz)   Height: 5' (1.524 m)      Physical Exam  Constitutional:       General: She is not in acute distress.  Cardiovascular:      Rate and Rhythm: Normal rate and regular rhythm.      Heart sounds: Normal heart sounds. No murmur heard.  Pulmonary:      Effort: Pulmonary effort is normal. No respiratory distress.      Breath sounds: Normal breath sounds. No wheezing, rhonchi or rales.   Skin:     General: Skin is warm and dry.   Neurological:      General: No focal deficit present.      Mental Status: She is alert.   Psychiatric:         Mood and Affect: Mood normal.         Behavior: Behavior normal.         Thought  Content: Thought content normal.         Results for orders placed or performed in visit on 12/19/24   GFAP, Plasma    Collection Time: 12/19/24  2:20 PM   Result Value Ref Range    GFAP, Plasma 35.30 0.00 - 186.00 pg/mL   Neurofilament Light Chain    Collection Time: 12/19/24  2:20 PM   Result Value Ref Range    Neurofilament Light Chain, Plasma 9.2 <=28.8 pg/mL   Creatinine Serum, ASAP    Collection Time: 12/19/24  2:20 PM   Result Value Ref Range    Creatinine 0.7 0.5 - 1.4 mg/dL    eGFR >60.0 >60 mL/min/1.73 m^2      Assessment:       1. Annual physical exam    2. Hypercholesteremia    3. Multiple sclerosis    4. LIZZETH (generalized anxiety disorder)    5. Major depressive disorder, recurrent episode, moderate    6. Need for vaccination    7. Encounter for screening mammogram for breast cancer        Plan:       Annual physical exam  -     CBC Auto Differential; Future; Expected date: 03/21/2025  -     Comprehensive Metabolic Panel; Future; Expected date: 03/21/2025  -     Hemoglobin A1C; Future; Expected date: 03/21/2025  -     Lipid Panel; Future; Expected date: 03/21/2025  -     TSH; Future; Expected date: 03/21/2025  Continue to work on dietary improvements (decrease overall calorie intake, decrease sugar and carb intake, decrease animal protein intake)  Continue to exercise at least 30-40 minutes, 3 times per week  Immunizations were discussed and Prevnar today.  Shingrix and RSV from pharmacy  Preventative exams were discussed and up to date   Hypercholesteremia  -     Lipid Panel; Future; Expected date: 03/21/2025  -     rosuvastatin (CRESTOR) 10 MG tablet; Take 1 tablet (10 mg total) by mouth every evening.  Dispense: 90 tablet; Refill: 3  Continue Statin  Multiple sclerosis  Continue Mgt per neurology  LIZZETH (generalized anxiety disorder)  Continue current medications per Psych  Major depressive disorder, recurrent episode, moderate    Need for vaccination  -     pneumoc 20-kyra conj-dip cr(PF) (PREVNAR-20  (PF)) injection Syrg 0.5 mL    Visit today included increased complexity associated with the care of the episodic problem HLD addressed and managing the longitudinal care of the patient due to the serious and/or complex managed problem(s) as above.       Medication List with Changes/Refills   Current Medications    ALPRAZOLAM (XANAX) 0.5 MG TABLET    Take 1 tablet (0.5 mg total) by mouth 2 (two) times daily as needed for Anxiety.    BUPROPION (WELLBUTRIN XL) 300 MG 24 HR TABLET    Take 1 tablet (300 mg total) by mouth once daily.    CHOLECALCIFEROL, VITAMIN D3, (VITAMIN D3) 5,000 UNIT TAB    Take 5,000 Units by mouth once daily.     DENTA 5000 PLUS 1.1 % CREA    USE AS DIRECTED DAILY    DEXTROAMPHETAMINE-AMPHETAMINE (ADDERALL) 20 MG TABLET    Take 1 tablet by mouth 2 (two) times daily.    DICLOFENAC SODIUM (VOLTAREN) 1 % GEL    Apply 2 g topically 4 (four) times daily.    DULOXETINE (CYMBALTA) 60 MG CAPSULE    Take 2 capsules (120 mg total) by mouth once daily.    GABAPENTIN (NEURONTIN) 600 MG TABLET    TAKE 2 TABLETS EVERY MORNING, 1 TABLET AT NOON AND 2 TABLETS AT BEDTIME    GLATIRAMER (COPAXONE, GLATOPA) 40 MG/ML INJECTION    INJECT ONE SYRINGE SUBCUTANEOUSLY 3 TIMES A WEEK AT LEAST 48 HOURS APART. ALLOW TO WARM TO ROOM TEMP FOR 20 MINUTES. REFRIGERATE.    HYDROCORTISONE 2.5 % CREAM    Apply topically.    KETOCONAZOLE (NIZORAL) 2 % SHAMPOO    Apply topically.    LATANOPROST 0.005 % OPHTHALMIC SOLUTION    Place 1 drop into both eyes every evening.    MELOXICAM (MOBIC) 15 MG TABLET    Take 1 tablet (15 mg total) by mouth once daily.    MULTIVITAMIN (THERAGRAN) PER TABLET    Take by mouth. 1 Tablet Oral Every day    PREVIDENT 5000 SENSITIVE 1.1-5 % PSTE    Apply topically once daily.     QUETIAPINE (SEROQUEL) 25 MG TAB    TAKE 1 TO 2 TABLETS BY MOUTH EVERY DAY AT BEDTIME AS NEEDED SLEEP    QUETIAPINE (SEROQUEL) 50 MG TABLET    Take 1 tablet (50 mg total) by mouth every evening.    SOLIFENACIN (VESICARE) 5 MG TABLET     TAKE 1 TABLET BY MOUTH EVERY DAY    TIZANIDINE (ZANAFLEX) 2 MG TABLET    TAKE 1/2 TABLET BY MOUTH TWICE A DAY THEN 2 TABLETS AT BEDTIME AS NEEDED   Changed and/or Refilled Medications    Modified Medication Previous Medication    ROSUVASTATIN (CRESTOR) 10 MG TABLET rosuvastatin (CRESTOR) 10 MG tablet       Take 1 tablet (10 mg total) by mouth every evening.    Take 1 tablet (10 mg total) by mouth every evening.

## 2025-03-27 ENCOUNTER — OFFICE VISIT (OUTPATIENT)
Dept: PSYCHIATRY | Facility: CLINIC | Age: 65
End: 2025-03-27
Payer: COMMERCIAL

## 2025-03-27 DIAGNOSIS — R41.840 CONCENTRATION DEFICIT: ICD-10-CM

## 2025-03-27 DIAGNOSIS — F33.41 MDD (MAJOR DEPRESSIVE DISORDER), RECURRENT, IN PARTIAL REMISSION: Primary | ICD-10-CM

## 2025-03-27 DIAGNOSIS — F51.04 CHRONIC INSOMNIA: ICD-10-CM

## 2025-03-27 DIAGNOSIS — F41.1 GAD (GENERALIZED ANXIETY DISORDER): ICD-10-CM

## 2025-03-27 DIAGNOSIS — R53.82 CHRONIC FATIGUE: ICD-10-CM

## 2025-03-27 DIAGNOSIS — G35 MULTIPLE SCLEROSIS: ICD-10-CM

## 2025-03-27 DIAGNOSIS — F34.1 DYSTHYMIC DISORDER: ICD-10-CM

## 2025-03-27 DIAGNOSIS — R41.840 ATTENTION AND CONCENTRATION DEFICIT: ICD-10-CM

## 2025-03-27 DIAGNOSIS — F41.1 GENERALIZED ANXIETY DISORDER: ICD-10-CM

## 2025-03-27 RX ORDER — DEXTROAMPHETAMINE SACCHARATE, AMPHETAMINE ASPARTATE, DEXTROAMPHETAMINE SULFATE AND AMPHETAMINE SULFATE 5; 5; 5; 5 MG/1; MG/1; MG/1; MG/1
1 TABLET ORAL 2 TIMES DAILY
Qty: 60 TABLET | Refills: 0 | Status: SHIPPED | OUTPATIENT
Start: 2025-05-26

## 2025-03-27 RX ORDER — ALPRAZOLAM 0.5 MG/1
0.5 TABLET ORAL 2 TIMES DAILY PRN
Qty: 60 TABLET | Refills: 3 | Status: SHIPPED | OUTPATIENT
Start: 2025-03-27

## 2025-03-27 RX ORDER — DEXTROAMPHETAMINE SACCHARATE, AMPHETAMINE ASPARTATE, DEXTROAMPHETAMINE SULFATE AND AMPHETAMINE SULFATE 5; 5; 5; 5 MG/1; MG/1; MG/1; MG/1
1 TABLET ORAL 2 TIMES DAILY
Qty: 60 TABLET | Refills: 0 | Status: SHIPPED | OUTPATIENT
Start: 2025-03-27

## 2025-03-27 RX ORDER — DEXTROAMPHETAMINE SACCHARATE, AMPHETAMINE ASPARTATE, DEXTROAMPHETAMINE SULFATE AND AMPHETAMINE SULFATE 5; 5; 5; 5 MG/1; MG/1; MG/1; MG/1
1 TABLET ORAL 2 TIMES DAILY
Qty: 60 TABLET | Refills: 0 | Status: SHIPPED | OUTPATIENT
Start: 2025-04-26

## 2025-03-27 NOTE — PROGRESS NOTES
"Outpatient Psychiatry Follow-Up Visit (MD/NP)   03/27/2025    Session Length:  30 minutes (E&M level 4)     Clinical Status of Patient: Outpatient (Ambulatory)    Chief Complaint: Karen D Eleser is a 64 y.o. female who presents today for follow-up of chronic depression and anxiety.     Met with patient.     Interval History and Content of Current Session:  Interim Events/Subjective Report/Content of Current Session:  First f/u appt with me since 12/19/2024 (in person).    Pt had seen Dr. Tash Clark in the past; last appointment was on 9/28/2015.      Plan from last session:  "Continue all current meds as noted above.   Three Rx for Adderall, each for a 30 day supply with no refills & with "Do not fill until" dates posted accordingly, were e-Rx to pt's preferred pharmacy on file."    She states she has been "OK", "doing the same".    "Things are stable".    She lives and tends to stay by herself.   She has "more good days than bad".    A "bad day" is when she has no energy, motivation and does not get out of bed.    This lasts about a day.  Sometimes, it seems to correlate after she has days when she is very active.     She had her annual physical -- "everything was fine".      Mood -- "pretty good"; "no big highs or lows".    She has chronic fatigue, neuropathy (MS), chronic pain.      She takes Seroquel 75 mg nightly for sleep.    She takes the Xanax for anxiety--usually once a day.      She is looking forward to travelling to [place] and the [facility] in September.    She hopes it will be an enjoyable experience.      She sees Dr. Minaya for MS and fibromyalgia.     She takes Copaxone injections.  She denies AE's.    She also takes gabapentin (receives from Dr. Minaya) for neuropathic pain.  "I always feel that numbness, but if I don't take it, it gets really bad".     She continues to have chronic painful neuropathy in her feet, legs due to lumbar disc herniation.       She has NOT been taking the Adderall " "daily.     It mainly helps with energy and focus.    She may average 10 - 15 tablets every 30 days.   She has been told by her eye doctor that she has glaucoma.   She is on eye drops.    [I checked resources and did NOT find a positive correlation with open angle glaucoma; possible correlation with narrow angle glaucoma.]     She occasionally takes Xanax for anxiety, but not daily.    She states she can definitely tell when the Adderall is working and when it wears off.      Current SIGECAPS:    Sleep -- has had problems lately falling asleep.  She takes 75 mg of Seroquel most nights to help fall asleep.    Interests -- low in general.  Still has low structure -- tends to stay at home, likes to watch TV or read.     Guilt -- for some past events with close family members.   Energy -- fluctuates; some days OK, some < normal, easily fatigued.       Concentration -- poor due to MS; has a hard time reading      Appetite -- "fine"   Psychomotor --  Somewhat decreased   Suicidal ideation -- denies       She continues to see her pain management specialist at [facility] on [address].    She has used medical marijuana in the past, but not recently.        I have encouraged pt to keep in touch with others (friends, relatives).      Past psych meds: Celexa, lexapro, and effexor in the past.    From previous sessions:  Her father had endocarditis when he was younger.  He then had to have a pacemaker, then dual pacemaker/defibrillator.  He also had 3 sisters (her aunts) who all had heart disease (I did not asked if they smoked).  He had 3 older brothers who have been healthy.  She denies heart disease on her mother's side.    Her sister  from a massive MI at age 57 yo.  However, she abused pills and OTC meds and had gone to rehab at [facility] just months prior to her death.  She notes she and her sister were very close; they even lived together for a while.    Pt has denied ever having problems with her " "heart.  She stated she never had a stress test, but she has had "plenty of" EKG's.   --Pt had neuropsychological testing on 19 with Dr. Butt for interpretation.  It did not show anything significant besides some slowing of processing speed thought likely due to the MS.   She had been treated by Dr. Royce Pina for MS until his death in .  She has also been treated by other neurologists.  Dr. Maia Minaya is her neurologist currently.   --Regarding her mom and sister:  Mother passed: 2015. Sister passed: 2014. Sister had problems with drugs and alcohol, was a nurse and lost her nursing license.  She  from a massive MI while in the snf house after substance rehab.  She was close to them both.  She had enjoyed going to the movies, going out to eat, travel, etc.  However, she had always done these things with her sister, who  in 2014.  She has no one in her life that she is close to compared to her sister in the past.  She has a brother who is 6 years younger -- "he can be very abrasive, he talks real loud".  "He also has his life, his children".  She also notes he does not understand or appreciate her mental illness.   --Regarding past intimate relationships: She has had just one very serious relationship that lasted for 10 years.  She states the relationship ended because he lied to her & told her that he worked and stayed at his mother's house, caring for her.  However, he did not work, lived off his mother's SS, then was kicked out of the mom's trailer by his siblings after she , so he was homeless for a while.  She thinks he may be living with his ex-wife now.  She states this happened about 5 years ago.    Since then, she had met 2 guys on line ([employer]) -- went out with each of them a few times, but nothing ever came of this.    She notes her mother was very controlling.  Pt also was shy growing up.  Pt denies ever being sexually molested.  She denies ever having " problems with alcohol or drugs.  She is out on disability from work ([employer]) as a .    She is still getting a check through her work disability co.     PSYCHOTHERAPY ADD-ON [phone]   30 (16-37*) minutes   Target symptoms: depression, anxiety, fatigue  Why chosen therapy is appropriate versus another modality: relevant to diagnosis, patient responds to this modality  Outcome monitoring methods: self-report  Therapeutic intervention type: supportive psychotherapy  Topics discussed/themes: stress related to medical comorbidities, life stage transitional issues, grief, social isolation, self care/nurturing.  The patient's response to the intervention is accepting.   The patient's progress toward treatment goals is fair.  Duration of intervention: 10 minutes      Review of Systems   PSYCHIATRIC: Pertinant items are noted in the narrative.  CONSTITUTIONAL: Some recent wt fluctuations.  Chronic fatigue.     MUSCULOSKELETAL:  Chronic generalized aches/pains.   NEUROLOGIC: Some occasional mild numbness and tingling in her arms.  + chronic nuropathic pain in her feet/legs.  No weakness, seizures, confusion, memory loss, tremor or other abnormal movements.  ENDOCRINE: No polydipsia or polyuria.  INTEGUMENTARY: No rashes or lacerations.  EYES: No exophthalmos, jaundice or blindness.  ENT: No dizziness, tinnitus or hearing loss.  RESPIRATORY: No shortness of breath.  CARDIOVASCULAR: No tachycardia or chest pain.  GASTROINTESTINAL: No nausea, vomiting, pain, constipation or diarrhea.  GENITOURINARY: No frequency, dysuria.      Past Medical, Family and Social History: The patient's past medical, family and social history have been reviewed and updated as appropriate within the electronic medical record -- see encounter notes.       Current Outpatient Medications:     ALPRAZolam (XANAX) 0.5 MG tablet, Take 1 tablet (0.5 mg total) by mouth 2 (two) times daily as needed for Anxiety., Disp: 60 tablet, Rfl: 3     buPROPion (WELLBUTRIN XL) 300 MG 24 hr tablet, Take 1 tablet (300 mg total) by mouth once daily., Disp: 90 tablet, Rfl: 3    cholecalciferol, vitamin D3, (VITAMIN D3) 5,000 unit Tab, Take 5,000 Units by mouth once daily. , Disp: , Rfl:     DENTA 5000 PLUS 1.1 % Crea, USE AS DIRECTED DAILY, Disp: , Rfl:     dextroamphetamine-amphetamine (ADDERALL) 20 mg tablet, Take 1 tablet by mouth 2 (two) times daily., Disp: 60 tablet, Rfl: 0    diclofenac sodium (VOLTAREN) 1 % Gel, Apply 2 g topically 4 (four) times daily., Disp: 5 Tube, Rfl: 0    DULoxetine (CYMBALTA) 60 MG capsule, Take 2 capsules (120 mg total) by mouth once daily., Disp: 60 capsule, Rfl: 12    gabapentin (NEURONTIN) 600 MG tablet, TAKE 2 TABLETS EVERY MORNING, 1 TABLET AT NOON AND 2 TABLETS AT BEDTIME, Disp: 450 tablet, Rfl: 1    glatiramer (COPAXONE, GLATOPA) 40 mg/mL injection, INJECT ONE SYRINGE SUBCUTANEOUSLY 3 TIMES A WEEK AT LEAST 48 HOURS APART. ALLOW TO WARM TO ROOM TEMP FOR 20 MINUTES. REFRIGERATE., Disp: 12 each, Rfl: 5    hydrocortisone 2.5 % cream, Apply topically., Disp: , Rfl:     ketoconazole (NIZORAL) 2 % shampoo, Apply topically., Disp: , Rfl:     latanoprost 0.005 % ophthalmic solution, Place 1 drop into both eyes every evening., Disp: 7.5 mL, Rfl: 4    meloxicam (MOBIC) 15 MG tablet, Take 1 tablet (15 mg total) by mouth once daily., Disp: 90 tablet, Rfl: 3    multivitamin (THERAGRAN) per tablet, Take by mouth. 1 Tablet Oral Every day, Disp: , Rfl:     PREVIDENT 5000 SENSITIVE 1.1-5 % Pste, Apply topically once daily. , Disp: , Rfl:     QUEtiapine (SEROQUEL) 25 MG Tab, TAKE 1 TO 2 TABLETS BY MOUTH EVERY DAY AT BEDTIME AS NEEDED SLEEP, Disp: 180 tablet, Rfl: 3    QUEtiapine (SEROQUEL) 50 MG tablet, Take 1 tablet (50 mg total) by mouth every evening., Disp: 90 tablet, Rfl: 3    rosuvastatin (CRESTOR) 10 MG tablet, Take 1 tablet (10 mg total) by mouth every evening., Disp: 90 tablet, Rfl: 3    solifenacin (VESICARE) 5 MG tablet, TAKE 1 TABLET  "BY MOUTH EVERY DAY, Disp: 90 tablet, Rfl: 3    tiZANidine (ZANAFLEX) 2 MG tablet, TAKE 1/2 TABLET BY MOUTH TWICE A DAY THEN 2 TABLETS AT BEDTIME AS NEEDED, Disp: 270 tablet, Rfl: 3    She occasionally takes Xanax (NOT DAILY) -- takes occasional one tab in day for anxiety, or may take one at night to help her relax and fall asleep.  She takes the Adderall occasionally as well.   She also takes tizanidine 2 tabs nightly for sleep and chronic pain.         Gabapentin is for peripheral neuropathy in her legs/feet.   She denies taking any Tramadol lately.   (I checked an [facility] pharmacy query.  Her reported use of the Xanax and Adderall is currently consistent with this query).      In the past, she had stated she took medical marijuana that is "prescribed" by her pain management doctor and dispensed by a pharmacy in [place].  It is a tincture in a bottle with a dropper.  It is labelled "" 1 ml dropper that she can take up to TID (takes for pain).  She states it helps with alleviate the pain; she estimates it provides about 50% pain relief.    She takes the THC in conjunction with oral CBD oil -- "(the CBD oil) gives that extra boost".    With this combination, she has needed to use less Tramadol or Norco.    Compliance: yes    Side effects: Ambien -- visual hallucinations and illusions, sleepwalking/sleepeating.     Risk Parameters:  Patient reports no suicidal ideation  Patient reports no homicidal ideation  Patient reports no self-injurious behavior  Patient reports no violent behavior    Exam (detailed: at least 9 elements; comprehensive: all 15 elements)    Constitutional  Vitals:  Most recent vital signs, dated less than 90 days prior to this appointment, were reviewed:     There were no vitals filed for this visit.         My Vitals       Weight Blood Pressure BMI Heartrate   11/29/2022 175 lb 6 oz  127/81  34.3  81    3/16/2023 167 lb 8.8 oz  124/82   98    3/22/2023 167 lb   32.6     11/14/2023 170 lb " "4.9 oz  157/110 (H)      2/6/2024 168 lb 10.4 oz  138/102 (H)  32.9  100      138/108 (H)      2/21/2024  128/82   87    12/19/2024 157 lb 11.8 oz  139/68   87       Legend:  (H) High     General:  unremarkable, age appropriate, well dressed, neatly groomed     Musculoskeletal  Muscle Strength/Tone:  Normal   Gait & Station:  Normal gait     Psychiatric  Speech:  normal tone, normal rate, normal pitch, normal volume, spontaneous    Mood & Affect:  "OK"  Euthymic, restricted, appropriate   Thought Process:  normal and logical, mildly circumstantial   Associations:  intact    Thought Content:  normal, no suicidality, no homicidality, delusions, or paranoia    Insight & Judgement:  good, intact  adequate to circumstances, age appropriate, good    Orientation:  grossly intact, person, place, time/date, situation    Memory:  intact for content of interview, grossly intact    Language:  grossly intact    Attention Span & Concentration:  able to focus    Fund of Knowledge:  intact and appropriate to age and level of education, familiar with aspects of current personal life        AIMS Screening:  Not due today.       Prior EKG:  Test Reason : Z79.899  Vent. Rate : 077 BPM     Atrial Rate : 077 BPM     P-R Int : 136 ms          QRS Dur : 090 ms      QT Int : 406 ms       P-R-T Axes : 033 000 073 degrees     QTc Int : 459 ms  Normal sinus rhythm  Poor R wave progression  Abnormal ECG  When compared with ECG of 21-JUL-2008 08:45,  Nonspecific T wave abnormality no longer evident in Inferior leads  QT has lengthened  Confirmed by Marty VEE, Yaneli ([id number]) on 8/11/2017 4:36:19 PM      Assessment and Diagnosis    Status/Progress: Based on the examination today, the patient's problem(s) is/are adequately, but not ideally controlled. New problems have not been presented today. Comorbidities (chronic pain) are complicating management of the primary condition. There are no active rule-out diagnoses for this patient at this " "time.    General Impression:   Major Depressive Disorder, Recurrent: in partial remission  Dysthymic Disorder   Generalized Anxiety Disorder  Chronic Insomnia   Attention and concentration deficit  Multiple Sclerosis (NOT CODED)   Also: chronic pain, CHELSEA    Intervention/Counseling/Treatment Plan    Current medication management:  Continue all current meds as noted above.   Three Rx for Adderall for a 30 day supply with no refills & with "Do not fill until" dates posted accordingly were e-Rx to pt's preferred pharmacy on file.      Additional Notes:  Encouraged individual psychotherapy for support. Patient had been meeting with LCSW in Neurology clinic; she can continue this (if insurance allows) or consider f/u with Dr. Nery Toledo or other therapist in our clinic.     Return to Clinic: ~ 3 - 4 months, or sooner prn.       AIMS DUE IN JUNE 2025.    Madhav Tyler MD     "

## 2025-04-04 DIAGNOSIS — M51.369 DDD (DEGENERATIVE DISC DISEASE), LUMBAR: ICD-10-CM

## 2025-04-04 RX ORDER — MELOXICAM 15 MG/1
15 TABLET ORAL
Qty: 90 TABLET | Refills: 3 | Status: SHIPPED | OUTPATIENT
Start: 2025-04-04

## 2025-04-04 NOTE — TELEPHONE ENCOUNTER
No care due was identified.  [facility] Embedded Care Due Messages. Reference number: [id number].   4/04/2025 12:03:59 AM CDT

## 2025-04-07 DIAGNOSIS — G35 MULTIPLE SCLEROSIS: ICD-10-CM

## 2025-04-08 RX ORDER — GLATIRAMER ACETATE 40 MG/ML
INJECTION, SOLUTION SUBCUTANEOUS
Qty: 36 EACH | Refills: 1 | Status: SHIPPED | OUTPATIENT
Start: 2025-04-08

## 2025-04-16 DIAGNOSIS — G35 MS (MULTIPLE SCLEROSIS): ICD-10-CM

## 2025-04-16 RX ORDER — GABAPENTIN 600 MG/1
TABLET ORAL
Qty: 450 TABLET | Refills: 1 | Status: SHIPPED | OUTPATIENT
Start: 2025-04-16

## 2025-04-29 ENCOUNTER — HOSPITAL ENCOUNTER (OUTPATIENT)
Dept: RADIOLOGY | Facility: HOSPITAL | Age: 65
Discharge: HOME | End: 2025-04-29
Attending: FAMILY MEDICINE
Payer: COMMERCIAL

## 2025-04-29 DIAGNOSIS — Z12.31 ENCOUNTER FOR SCREENING MAMMOGRAM FOR BREAST CANCER: ICD-10-CM

## 2025-04-29 PROCEDURE — 77067 SCR MAMMO BI INCL CAD: CPT | Mod: TC,PO

## 2025-04-29 PROCEDURE — 77067 SCR MAMMO BI INCL CAD: CPT | Mod: 26,,, | Performed by: RADIOLOGY

## 2025-04-29 PROCEDURE — 77063 BREAST TOMOSYNTHESIS BI: CPT | Mod: 26,,, | Performed by: RADIOLOGY

## 2025-04-30 ENCOUNTER — RESULTS FOLLOW-UP (OUTPATIENT)
Dept: FAMILY MEDICINE | Facility: CLINIC | Age: 65
End: 2025-04-30

## 2025-05-13 ENCOUNTER — PATIENT MESSAGE (OUTPATIENT)
Dept: PSYCHIATRY | Facility: CLINIC | Age: 65
End: 2025-05-13
Payer: COMMERCIAL

## 2025-06-14 DIAGNOSIS — F51.04 CHRONIC INSOMNIA: ICD-10-CM

## 2025-06-14 DIAGNOSIS — F33.1 MAJOR DEPRESSIVE DISORDER, RECURRENT EPISODE, MODERATE: ICD-10-CM

## 2025-06-16 RX ORDER — QUETIAPINE FUMARATE 50 MG/1
50 TABLET, FILM COATED ORAL NIGHTLY
Qty: 90 TABLET | Refills: 3 | Status: SHIPPED | OUTPATIENT
Start: 2025-06-16

## 2025-06-19 ENCOUNTER — PATIENT MESSAGE (OUTPATIENT)
Dept: PSYCHIATRY | Facility: CLINIC | Age: 65
End: 2025-06-19
Payer: COMMERCIAL

## 2025-06-25 ENCOUNTER — OFFICE VISIT (OUTPATIENT)
Dept: PSYCHIATRY | Facility: CLINIC | Age: 65
End: 2025-06-25
Payer: COMMERCIAL

## 2025-06-25 DIAGNOSIS — F41.1 GENERALIZED ANXIETY DISORDER: ICD-10-CM

## 2025-06-25 DIAGNOSIS — G89.4 CHRONIC PAIN SYNDROME: ICD-10-CM

## 2025-06-25 DIAGNOSIS — F33.41 MDD (MAJOR DEPRESSIVE DISORDER), RECURRENT, IN PARTIAL REMISSION: Primary | ICD-10-CM

## 2025-06-25 DIAGNOSIS — R41.840 ATTENTION AND CONCENTRATION DEFICIT: ICD-10-CM

## 2025-06-25 DIAGNOSIS — R53.82 CHRONIC FATIGUE: ICD-10-CM

## 2025-06-25 DIAGNOSIS — F51.04 CHRONIC INSOMNIA: ICD-10-CM

## 2025-06-25 DIAGNOSIS — F33.1 MAJOR DEPRESSIVE DISORDER, RECURRENT EPISODE, MODERATE: ICD-10-CM

## 2025-06-25 DIAGNOSIS — F41.1 GAD (GENERALIZED ANXIETY DISORDER): ICD-10-CM

## 2025-06-25 DIAGNOSIS — R41.840 CONCENTRATION DEFICIT: ICD-10-CM

## 2025-06-25 DIAGNOSIS — G35 MULTIPLE SCLEROSIS: ICD-10-CM

## 2025-06-25 PROCEDURE — 1159F MED LIST DOCD IN RCRD: CPT | Mod: CPTII,95,, | Performed by: PSYCHIATRY & NEUROLOGY

## 2025-06-25 PROCEDURE — 98005 SYNCH AUDIO-VIDEO EST LOW 20: CPT | Mod: 95,,, | Performed by: PSYCHIATRY & NEUROLOGY

## 2025-06-25 PROCEDURE — 1160F RVW MEDS BY RX/DR IN RCRD: CPT | Mod: CPTII,95,, | Performed by: PSYCHIATRY & NEUROLOGY

## 2025-06-25 RX ORDER — DULOXETIN HYDROCHLORIDE 60 MG/1
120 CAPSULE, DELAYED RELEASE ORAL DAILY
Qty: 60 CAPSULE | Refills: 12 | Status: SHIPPED | OUTPATIENT
Start: 2025-06-25

## 2025-06-25 RX ORDER — BUPROPION HYDROCHLORIDE 300 MG/1
300 TABLET ORAL DAILY
Qty: 90 TABLET | Refills: 3 | Status: SHIPPED | OUTPATIENT
Start: 2025-06-25

## 2025-06-25 RX ORDER — DEXTROAMPHETAMINE SACCHARATE, AMPHETAMINE ASPARTATE, DEXTROAMPHETAMINE SULFATE AND AMPHETAMINE SULFATE 5; 5; 5; 5 MG/1; MG/1; MG/1; MG/1
1 TABLET ORAL 2 TIMES DAILY
Qty: 60 TABLET | Refills: 0 | Status: SHIPPED | OUTPATIENT
Start: 2025-06-25

## 2025-06-25 RX ORDER — DEXTROAMPHETAMINE SACCHARATE, AMPHETAMINE ASPARTATE, DEXTROAMPHETAMINE SULFATE AND AMPHETAMINE SULFATE 5; 5; 5; 5 MG/1; MG/1; MG/1; MG/1
1 TABLET ORAL 2 TIMES DAILY
Qty: 60 TABLET | Refills: 0 | Status: SHIPPED | OUTPATIENT
Start: 2025-07-25

## 2025-06-25 RX ORDER — ALPRAZOLAM 0.5 MG/1
0.5 TABLET ORAL 2 TIMES DAILY PRN
Qty: 60 TABLET | Refills: 3 | Status: SHIPPED | OUTPATIENT
Start: 2025-06-25

## 2025-06-25 RX ORDER — DEXTROAMPHETAMINE SACCHARATE, AMPHETAMINE ASPARTATE, DEXTROAMPHETAMINE SULFATE AND AMPHETAMINE SULFATE 5; 5; 5; 5 MG/1; MG/1; MG/1; MG/1
1 TABLET ORAL 2 TIMES DAILY
Qty: 60 TABLET | Refills: 0 | Status: SHIPPED | OUTPATIENT
Start: 2025-08-24

## 2025-06-25 NOTE — PROGRESS NOTES
"Outpatient Psychiatry Follow-Up Visit (MD/NP)--telemedicine visit   06/25/2025    The patient location is: [place]  The chief complaint leading to consultation is: follow up (depression, anxiety, concentration problems)    Visit type: audiovisual    Face to Face time with patient: 20 minutes  30 minutes of total time spent on the encounter, which includes face to face time and non-face to face time preparing to see the patient (eg, review of tests), Obtaining and/or reviewing separately obtained history, Documenting clinical information in the electronic or other health record, Independently interpreting results (not separately reported) and communicating results to the patient/family/caregiver, or Care coordination (not separately reported).     Each patient to whom he or she provides medical services by telemedicine is:  (1) informed of the relationship between the physician and patient and the respective role of any other health care provider with respect to management of the patient; and (2) notified that he or she may decline to receive medical services by telemedicine and may withdraw from such care at any time.      Session Length:  30 minutes ([id number])     Clinical Status of Patient: Outpatient (Ambulatory)    Chief Complaint: Karen D Eleser is a 64 y.o. female who presents today for follow-up of chronic depression and anxiety.     Met with patient.     Interval History and Content of Current Session:  Interim Events/Subjective Report/Content of Current Session:  First f/u appt with me since 3/27/2025 (in person).    Pt had seen Dr. Tash Clark in the past; last appointment was on 9/28/2015.      Plan from last session:  "Continue all current meds as noted above.   Three Rx for Adderall, each for a 30 day supply with no refills & with "Do not fill until" dates posted accordingly, were e-Rx to pt's preferred pharmacy on file."    She states she has been "OK", "doing the same".    "Things are stable.  No " "major incidents."    She lives and tends to stay by herself.   She has occasional bad days.    A "bad day" is when she has low energy, motivation and does not want to get out of bed.    Sometimes this is linked to physical complaints.    This generally lasts for about 1 day.   Sometimes, it seems to correlate after she has days when she is more active.     She denies any specific NEW complaints.    She has chronic fatigue, neuropathy (MS), chronic pain -- mainly in lower back.    Occasionally has shooting pains down backs of her legs.      She takes Seroquel 75 mg nightly for sleep.    She takes the Xanax for anxiety--usually once a day.      She is looking forward to travelling to [place] and the [place] in September.    She hopes it will be an enjoyable experience.      She has a follow up appt with Neurology (Dr. Minaya) in mid-Sept.    She sees Dr. Minaya for MS and fibromyalgia.     She takes Copaxone injections.  She denies AE's.    She also takes gabapentin (receives from Dr. Minaya) for neuropathic pain.  "I always feel that numbness, but if I don't take it, it gets really bad".     She continues to have chronic painful neuropathy in her feet, legs due to lumbar disc herniation.       She has NOT been taking the Adderall daily.    It mainly helps with energy and focus.    She may average ~ 15 tablets every 30 days.   She has been told by her eye doctor that she has glaucoma.   She is on eye drops.    [I had checked resources and did NOT find a positive correlation with open angle glaucoma; possible correlation with narrow angle glaucoma.]   She states she can definitely tell when the Adderall is working and when it wears off.      Current SIGECAPS:    Sleep -- has had problems lately falling asleep.  She takes 50 - 75 mg of Seroquel most nights to help fall asleep.    Interests -- low in general.  Still has low structure -- tends to stay at home, likes to watch TV or read.     Guilt -- for some past " "events with close family members.   Energy -- fluctuates; some days OK, some < normal, easily fatigued.       Concentration -- poor due to MS; has a hard time reading      Appetite -- "fine"   Psychomotor --  Somewhat decreased   Suicidal ideation -- denies       She continues to see her pain management specialist at [facility] on [place].    She has used medical marijuana in the past, but not recently.        I have encouraged pt to keep in touch with others (friends, relatives).      Past psych meds: Celexa, lexapro, and effexor in the past.    From previous sessions:  Her father had endocarditis when he was younger.  He then had to have a pacemaker, then dual pacemaker/defibrillator.  He also had 3 sisters (her aunts) who all had heart disease (I did not asked if they smoked).  He had 3 older brothers who have been healthy.  She denies heart disease on her mother's side.    Her sister  from a massive MI at age 55 yo.  However, she abused pills and OTC meds and had gone to rehab at [place] just months prior to her death.  She notes she and her sister were very close; they even lived together for a while.    Pt has denied ever having problems with her heart.  She stated she never had a stress test, but she has had "plenty of" EKG's.   --Pt had neuropsychological testing on 19 with Dr. Butt for interpretation.  It did not show anything significant besides some slowing of processing speed thought likely due to the MS.   She had been treated by Dr. Royce Pina for MS until his death in .  She has also been treated by other neurologists.  Dr. Maia Minaya is her neurologist currently.   --Regarding her mom and sister:  Mother passed: 2015. Sister passed: 2014. Sister had problems with drugs and alcohol, was a nurse and lost her nursing license.  She  from a massive MI while in the [facility] house after substance rehab.  She was close to them both.  She had enjoyed " "going to the movies, going out to eat, travel, etc.  However, she had always done these things with her sister, who  in 2014.  She has no one in her life that she is close to compared to her sister in the past.  She has a brother who is 6 years younger -- "he can be very abrasive, he talks real loud".  "He also has his life, his children".  She also notes he does not understand or appreciate her mental illness.   --Regarding past intimate relationships: She has had just one very serious relationship that lasted for 10 years.  She states the relationship ended because he lied to her & told her that he worked and stayed at his mother's house, caring for her.  However, he did not work, lived off his mother's SS, then was kicked out of the mom's trailer by his siblings after she , so he was homeless for a while.  She thinks he may be living with his ex-wife now.  She states this happened about 5 years ago.    Since then, she had met 2 guys on line ([employer]) -- went out with each of them a few times, but nothing ever came of this.    She notes her mother was very controlling.  Pt also was shy growing up.  Pt denies ever being sexually molested.  She denies ever having problems with alcohol or drugs.  She is out on disability from work ([employer]) as a .    She is still getting a check through her work disability co.     PSYCHOTHERAPY ADD-ON +[id number]   30 (16-37*) minutes   Target symptoms: depression, anxiety, fatigue  Why chosen therapy is appropriate versus another modality: relevant to diagnosis, patient responds to this modality  Outcome monitoring methods: self-report  Therapeutic intervention type: supportive psychotherapy  Topics discussed/themes: stress related to medical comorbidities, life stage transitional issues, grief, social isolation, self care/nurturing.  The patient's response to the intervention is accepting.   The patient's progress toward treatment goals is " fair.  Duration of intervention: 0 minutes      Review of Systems   PSYCHIATRIC: Pertinant items are noted in the narrative.  CONSTITUTIONAL: Some recent wt fluctuations.  Chronic fatigue.     MUSCULOSKELETAL:  Chronic generalized aches/pains.   NEUROLOGIC: Some occasional mild numbness and tingling in her arms.  + chronic nuropathic pain in her feet/legs.  No weakness, seizures, confusion, memory loss, tremor or other abnormal movements.  ENDOCRINE: No polydipsia or polyuria.  INTEGUMENTARY: No rashes or lacerations.  EYES: No exophthalmos, jaundice or blindness.  ENT: No dizziness, tinnitus or hearing loss.  RESPIRATORY: No shortness of breath.  CARDIOVASCULAR: No tachycardia or chest pain.  GASTROINTESTINAL: No nausea, vomiting, pain, constipation or diarrhea.  GENITOURINARY: No frequency, dysuria.      Past Medical, Family and Social History: The patient's past medical, family and social history have been reviewed and updated as appropriate within the electronic medical record -- see encounter notes.       Current Outpatient Medications:     ALPRAZolam (XANAX) 0.5 MG tablet, Take 1 tablet (0.5 mg total) by mouth 2 (two) times daily as needed for Anxiety., Disp: 60 tablet, Rfl: 3    buPROPion (WELLBUTRIN XL) 300 MG 24 hr tablet, Take 1 tablet (300 mg total) by mouth once daily., Disp: 90 tablet, Rfl: 3    cholecalciferol, vitamin D3, (VITAMIN D3) 5,000 unit Tab, Take 5,000 Units by mouth once daily. , Disp: , Rfl:     DENTA 5000 PLUS 1.1 % Crea, USE AS DIRECTED DAILY, Disp: , Rfl:     dextroamphetamine-amphetamine (ADDERALL) 20 mg tablet, Take 1 tablet by mouth 2 (two) times daily., Disp: 60 tablet, Rfl: 0    dextroamphetamine-amphetamine (ADDERALL) 20 mg tablet, Take 1 tablet by mouth 2 (two) times daily., Disp: 60 tablet, Rfl: 0    dextroamphetamine-amphetamine (ADDERALL) 20 mg tablet, Take 1 tablet by mouth 2 (two) times daily., Disp: 60 tablet, Rfl: 0    diclofenac sodium (VOLTAREN) 1 % Gel, Apply 2 g  topically 4 (four) times daily., Disp: 5 Tube, Rfl: 0    DULoxetine (CYMBALTA) 60 MG capsule, Take 2 capsules (120 mg total) by mouth once daily., Disp: 60 capsule, Rfl: 12    gabapentin (NEURONTIN) 600 MG tablet, TAKE 2 TABLETS EVERY MORNING, 1 TABLET AT NOON AND 2 TABLETS AT BEDTIME, Disp: 450 tablet, Rfl: 1    glatiramer (GLATOPA) 40 mg/mL injection, INJECT 1 SYRINGE UNDER THE SKIN 3 TIMES A WEEK AT LEAST 48 HOURS APART, Disp: 36 each, Rfl: 1    hydrocortisone 2.5 % cream, Apply topically., Disp: , Rfl:     ketoconazole (NIZORAL) 2 % shampoo, Apply topically., Disp: , Rfl:     latanoprost 0.005 % ophthalmic solution, Place 1 drop into both eyes every evening., Disp: 7.5 mL, Rfl: 4    meloxicam (MOBIC) 15 MG tablet, TAKE 1 TABLET BY MOUTH EVERY DAY, Disp: 90 tablet, Rfl: 3    multivitamin (THERAGRAN) per tablet, Take by mouth. 1 Tablet Oral Every day, Disp: , Rfl:     PREVIDENT 5000 SENSITIVE 1.1-5 % Pste, Apply topically once daily. , Disp: , Rfl:     QUEtiapine (SEROQUEL) 25 MG Tab, TAKE 1 TO 2 TABLETS BY MOUTH EVERY DAY AT BEDTIME AS NEEDED SLEEP, Disp: 180 tablet, Rfl: 3    QUEtiapine (SEROQUEL) 50 MG tablet, TAKE 1 TABLET BY MOUTH EVERY EVENING., Disp: 90 tablet, Rfl: 3    rosuvastatin (CRESTOR) 10 MG tablet, Take 1 tablet (10 mg total) by mouth every evening., Disp: 90 tablet, Rfl: 3    solifenacin (VESICARE) 5 MG tablet, TAKE 1 TABLET BY MOUTH EVERY DAY, Disp: 90 tablet, Rfl: 3    tiZANidine (ZANAFLEX) 2 MG tablet, TAKE 1/2 TABLET BY MOUTH TWICE A DAY THEN 2 TABLETS AT BEDTIME AS NEEDED, Disp: 270 tablet, Rfl: 3    She occasionally takes Xanax (NOT DAILY) -- takes occasional one tab in day for anxiety, or may take one at night to help her relax and fall asleep.  She takes the Adderall once daily; may occasionally take an extra dose later in the afternoon.   She also takes tizanidine and gabapentin for chronic pain (Dr. Minaya).  Gabapentin is for peripheral neuropathy in her legs/feet.   She denies taking  "any Tramadol lately.   (I checked an [facility] pharmacy query.  Her reported use of the Xanax and Adderall is currently consistent with this query).      In the past, she had stated she took medical marijuana that is "prescribed" by her pain management doctor and dispensed by a pharmacy in [place].  It is a tincture in a bottle with a dropper.  It is labelled "" 1 ml dropper that she can take up to TID (takes for pain).  She states it helps with alleviate the pain; she estimates it provides about 50% pain relief.    She takes the THC in conjunction with oral CBD oil -- "(the CBD oil) gives that extra boost".    With this combination, she has needed to use less Tramadol or Norco.    Compliance: yes    Side effects: Ambien -- visual hallucinations and illusions, sleepwalking/sleepeating.     Risk Parameters:  Patient reports no suicidal ideation  Patient reports no homicidal ideation  Patient reports no self-injurious behavior  Patient reports no violent behavior    Exam (detailed: at least 9 elements; comprehensive: all 15 elements)    Constitutional  Vitals:  Most recent vital signs, dated less than 90 days prior to this appointment, were reviewed:     There were no vitals filed for this visit.  (VIRTUAL VISIT)     My Vitals       Weight Blood Pressure BMI Heartrate   11/8/2021 158 lb 8.2 oz  120/80  31  109    11/29/2022 175 lb 6 oz  127/81  34.3  81    3/16/2023 167 lb 8.8 oz  124/82   98    3/22/2023 167 lb   32.6     11/14/2023 170 lb 4.9 oz  157/110 (H)      2/6/2024 168 lb 10.4 oz  138/102 (H)  32.9  100      138/108 (H)      2/21/2024  128/82   87    12/19/2024 157 lb 11.8 oz  139/68   87    3/21/2025 162 lb 4.1 oz  132/68  31.7  93       Legend:  (H) High      General:  unremarkable, age appropriate, well dressed, neatly groomed     Musculoskeletal  Muscle Strength/Tone:  Not assessed   Gait & Station:  Not observed     Psychiatric  Speech:  normal tone, normal rate, normal pitch, normal volume, " "spontaneous    Mood & Affect:  "OK"  Euthymic, restricted, appropriate   Thought Process:  normal and logical, mildly circumstantial   Associations:  intact    Thought Content:  normal, no suicidality, no homicidality, delusions, or paranoia    Insight & Judgement:  good, intact  adequate to circumstances, age appropriate, good    Orientation:  grossly intact, person, place, time/date, situation    Memory:  intact for content of interview, grossly intact    Language:  grossly intact    Attention Span & Concentration:  able to focus    Fund of Knowledge:  intact and appropriate to age and level of education, familiar with aspects of current personal life        AIMS Screening:  Not done today (VIRTUAL VISIT).  No abnormal movements noted in hands or face during virtual session.         Prior EKG:  Test Reason : Z79.899  Vent. Rate : 077 BPM     Atrial Rate : 077 BPM     P-R Int : 136 ms          QRS Dur : 090 ms      QT Int : 406 ms       P-R-T Axes : 033 000 073 degrees     QTc Int : 459 ms  Normal sinus rhythm  Poor R wave progression  Abnormal ECG  When compared with ECG of 21-JUL-2008 08:45,  Nonspecific T wave abnormality no longer evident in Inferior leads  QT has lengthened  Confirmed by Marty VEE, [facility] ([id number]) on 8/11/2017 4:36:19 PM      Assessment and Diagnosis    Status/Progress: Based on the examination today, the patient's problem(s) is/are adequately, but not ideally controlled. New problems have not been presented today. Comorbidities (chronic pain) are complicating management of the primary condition. There are no active rule-out diagnoses for this patient at this time.    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   Three Rx for Adderall for a 30 day supply with no refills & with "Do not fill until" dates posted accordingly were e-Rx to pt's preferred pharmacy on file.   Xanax was also sent via e-Rx to that same pharmacy.       Additional Notes:  Encouraged individual psychotherapy for support. Patient had been meeting with LCSW in Neurology clinic; she can continue this (if insurance allows) or consider f/u with Dr. Nery Toledo or other therapist in our clinic.     Return to Clinic: ~ 3 - 4 months, or sooner prn.       AIMS DUE IN JUNE 2025 -- will do on RTC.    Madhav Tyler MD             "

## 2025-07-30 ENCOUNTER — PATIENT MESSAGE (OUTPATIENT)
Dept: PSYCHIATRY | Facility: CLINIC | Age: 65
End: 2025-07-30
Payer: COMMERCIAL

## 2025-08-01 ENCOUNTER — PATIENT MESSAGE (OUTPATIENT)
Dept: PSYCHIATRY | Facility: CLINIC | Age: 65
End: 2025-08-01
Payer: COMMERCIAL

## 2025-08-21 ENCOUNTER — PATIENT MESSAGE (OUTPATIENT)
Dept: NEUROLOGY | Facility: CLINIC | Age: 65
End: 2025-08-21
Payer: COMMERCIAL